# Patient Record
Sex: FEMALE | Race: WHITE | NOT HISPANIC OR LATINO | URBAN - METROPOLITAN AREA
[De-identification: names, ages, dates, MRNs, and addresses within clinical notes are randomized per-mention and may not be internally consistent; named-entity substitution may affect disease eponyms.]

---

## 2017-01-12 ENCOUNTER — EMERGENCY (EMERGENCY)
Facility: HOSPITAL | Age: 47
LOS: 1 days | Discharge: HOME | End: 2017-01-12
Admitting: EMERGENCY MEDICINE
Payer: COMMERCIAL

## 2017-01-12 ENCOUNTER — TRANSCRIPTION ENCOUNTER (OUTPATIENT)
Age: 47
End: 2017-01-12

## 2017-01-12 LAB
ALBUMIN SERPL ELPH-MCNC: 3.4 G/DL — SIGNIFICANT CHANGE UP (ref 3.3–5)
ALP SERPL-CCNC: 129 U/L — HIGH (ref 30–120)
ALT FLD-CCNC: 21 U/L DA — SIGNIFICANT CHANGE UP (ref 10–60)
AMYLASE P1 CFR SERPL: 31 U/L — SIGNIFICANT CHANGE UP (ref 25–125)
ANION GAP SERPL CALC-SCNC: 10 MMOL/L — SIGNIFICANT CHANGE UP (ref 5–17)
ANISOCYTOSIS BLD QL: SLIGHT — SIGNIFICANT CHANGE UP
APPEARANCE UR: CLEAR — SIGNIFICANT CHANGE UP
APTT BLD: 33.5 SEC — SIGNIFICANT CHANGE UP (ref 27.5–37.4)
AST SERPL-CCNC: 10 U/L — SIGNIFICANT CHANGE UP (ref 10–40)
BASOPHILS # BLD AUTO: 0.1 K/UL — SIGNIFICANT CHANGE UP (ref 0–0.2)
BASOPHILS NFR BLD AUTO: 0.6 % — SIGNIFICANT CHANGE UP (ref 0–2)
BILIRUB SERPL-MCNC: 0.4 MG/DL — SIGNIFICANT CHANGE UP (ref 0.2–1.2)
BILIRUB UR-MCNC: NEGATIVE — SIGNIFICANT CHANGE UP
BUN SERPL-MCNC: 8 MG/DL — SIGNIFICANT CHANGE UP (ref 7–23)
CALCIUM SERPL-MCNC: 9 MG/DL — SIGNIFICANT CHANGE UP (ref 8.4–10.5)
CHLORIDE SERPL-SCNC: 98 MMOL/L — SIGNIFICANT CHANGE UP (ref 96–108)
CO2 SERPL-SCNC: 28 MMOL/L — SIGNIFICANT CHANGE UP (ref 22–31)
COLOR SPEC: YELLOW — SIGNIFICANT CHANGE UP
CREAT SERPL-MCNC: 0.85 MG/DL — SIGNIFICANT CHANGE UP (ref 0.5–1.3)
DIFF PNL FLD: ABNORMAL
EOSINOPHIL # BLD AUTO: 0 K/UL — SIGNIFICANT CHANGE UP (ref 0–0.5)
EOSINOPHIL NFR BLD AUTO: 0.3 % — SIGNIFICANT CHANGE UP (ref 0–6)
EPI CELLS # UR: SIGNIFICANT CHANGE UP
GLUCOSE SERPL-MCNC: 231 MG/DL — HIGH (ref 70–99)
GLUCOSE UR QL: 50 MG/DL
HCT VFR BLD CALC: 38.6 % — SIGNIFICANT CHANGE UP (ref 34.5–45)
HGB BLD-MCNC: 12 G/DL — SIGNIFICANT CHANGE UP (ref 11.5–15.5)
HYPOCHROMIA BLD QL: SLIGHT — SIGNIFICANT CHANGE UP
INR BLD: 1.18 RATIO — HIGH (ref 0.88–1.16)
KETONES UR-MCNC: NEGATIVE — SIGNIFICANT CHANGE UP
LEUKOCYTE ESTERASE UR-ACNC: NEGATIVE — SIGNIFICANT CHANGE UP
LIDOCAIN IGE QN: 108 U/L — SIGNIFICANT CHANGE UP (ref 73–393)
LYMPHOCYTES # BLD AUTO: 18.2 % — SIGNIFICANT CHANGE UP (ref 13–44)
LYMPHOCYTES # BLD AUTO: 2.9 K/UL — SIGNIFICANT CHANGE UP (ref 1–3.3)
MACROCYTES BLD QL: SLIGHT — SIGNIFICANT CHANGE UP
MCHC RBC-ENTMCNC: 23.6 PG — LOW (ref 27–34)
MCHC RBC-ENTMCNC: 31.1 GM/DL — LOW (ref 32–36)
MCV RBC AUTO: 76.1 FL — LOW (ref 80–100)
MICROCYTES BLD QL: SLIGHT — SIGNIFICANT CHANGE UP
MONOCYTES # BLD AUTO: 0.7 K/UL — SIGNIFICANT CHANGE UP (ref 0–0.9)
MONOCYTES NFR BLD AUTO: 4.5 % — SIGNIFICANT CHANGE UP (ref 2–14)
NEUTROPHILS # BLD AUTO: 12.2 K/UL — HIGH (ref 1.8–7.4)
NEUTROPHILS NFR BLD AUTO: 76.4 % — SIGNIFICANT CHANGE UP (ref 43–77)
NITRITE UR-MCNC: NEGATIVE — SIGNIFICANT CHANGE UP
OVALOCYTES BLD QL SMEAR: SLIGHT — SIGNIFICANT CHANGE UP
PH UR: 5 — SIGNIFICANT CHANGE UP (ref 4.8–8)
PLAT MORPH BLD: NORMAL — SIGNIFICANT CHANGE UP
PLATELET # BLD AUTO: 491 K/UL — HIGH (ref 150–400)
POIKILOCYTOSIS BLD QL AUTO: SLIGHT — SIGNIFICANT CHANGE UP
POLYCHROMASIA BLD QL SMEAR: SLIGHT — SIGNIFICANT CHANGE UP
POTASSIUM SERPL-MCNC: 3.6 MMOL/L — SIGNIFICANT CHANGE UP (ref 3.5–5.3)
POTASSIUM SERPL-SCNC: 3.6 MMOL/L — SIGNIFICANT CHANGE UP (ref 3.5–5.3)
PROT SERPL-MCNC: 8 G/DL — SIGNIFICANT CHANGE UP (ref 6–8.3)
PROT UR-MCNC: 30 MG/DL
PROTHROM AB SERPL-ACNC: 13.2 SEC — HIGH (ref 10–13.1)
RBC # BLD: 5.07 M/UL — SIGNIFICANT CHANGE UP (ref 3.8–5.2)
RBC # FLD: 15.6 % — HIGH (ref 10.3–14.5)
RBC BLD AUTO: ABNORMAL
RBC CASTS # UR COMP ASSIST: ABNORMAL /HPF (ref 0–4)
SODIUM SERPL-SCNC: 136 MMOL/L — SIGNIFICANT CHANGE UP (ref 135–145)
SP GR SPEC: 1.02 — SIGNIFICANT CHANGE UP (ref 1.01–1.02)
UROBILINOGEN FLD QL: NEGATIVE MG/DL — SIGNIFICANT CHANGE UP
WBC # BLD: 16 K/UL — HIGH (ref 3.8–10.5)
WBC # FLD AUTO: 16 K/UL — HIGH (ref 3.8–10.5)

## 2017-01-12 PROCEDURE — 85610 PROTHROMBIN TIME: CPT

## 2017-01-12 PROCEDURE — 76830 TRANSVAGINAL US NON-OB: CPT | Mod: 26

## 2017-01-12 PROCEDURE — 74177 CT ABD & PELVIS W/CONTRAST: CPT | Mod: 26

## 2017-01-12 PROCEDURE — 99284 EMERGENCY DEPT VISIT MOD MDM: CPT | Mod: 25

## 2017-01-12 PROCEDURE — 96375 TX/PRO/DX INJ NEW DRUG ADDON: CPT

## 2017-01-12 PROCEDURE — 83690 ASSAY OF LIPASE: CPT

## 2017-01-12 PROCEDURE — 81001 URINALYSIS AUTO W/SCOPE: CPT

## 2017-01-12 PROCEDURE — 74177 CT ABD & PELVIS W/CONTRAST: CPT

## 2017-01-12 PROCEDURE — 85027 COMPLETE CBC AUTOMATED: CPT

## 2017-01-12 PROCEDURE — 80053 COMPREHEN METABOLIC PANEL: CPT

## 2017-01-12 PROCEDURE — 96374 THER/PROPH/DIAG INJ IV PUSH: CPT

## 2017-01-12 PROCEDURE — 86901 BLOOD TYPING SEROLOGIC RH(D): CPT

## 2017-01-12 PROCEDURE — 85730 THROMBOPLASTIN TIME PARTIAL: CPT

## 2017-01-12 PROCEDURE — 76830 TRANSVAGINAL US NON-OB: CPT

## 2017-01-12 PROCEDURE — 99284 EMERGENCY DEPT VISIT MOD MDM: CPT

## 2017-01-12 PROCEDURE — 82150 ASSAY OF AMYLASE: CPT

## 2017-01-19 ENCOUNTER — RESULT REVIEW (OUTPATIENT)
Age: 47
End: 2017-01-19

## 2017-01-20 ENCOUNTER — APPOINTMENT (OUTPATIENT)
Dept: OBGYN | Facility: CLINIC | Age: 47
End: 2017-01-20

## 2017-02-27 ENCOUNTER — APPOINTMENT (OUTPATIENT)
Dept: OBGYN | Facility: CLINIC | Age: 47
End: 2017-02-27

## 2017-06-02 ENCOUNTER — APPOINTMENT (OUTPATIENT)
Dept: OBGYN | Facility: CLINIC | Age: 47
End: 2017-06-02

## 2017-09-19 ENCOUNTER — TRANSCRIPTION ENCOUNTER (OUTPATIENT)
Age: 47
End: 2017-09-19

## 2017-09-25 ENCOUNTER — TRANSCRIPTION ENCOUNTER (OUTPATIENT)
Age: 47
End: 2017-09-25

## 2018-11-12 ENCOUNTER — TRANSCRIPTION ENCOUNTER (OUTPATIENT)
Age: 48
End: 2018-11-12

## 2019-01-12 ENCOUNTER — TRANSCRIPTION ENCOUNTER (OUTPATIENT)
Age: 49
End: 2019-01-12

## 2019-02-01 ENCOUNTER — RESULT REVIEW (OUTPATIENT)
Age: 49
End: 2019-02-01

## 2019-02-01 ENCOUNTER — APPOINTMENT (OUTPATIENT)
Dept: OBGYN | Facility: CLINIC | Age: 49
End: 2019-02-01
Payer: COMMERCIAL

## 2019-02-01 PROCEDURE — 99396 PREV VISIT EST AGE 40-64: CPT

## 2019-02-19 ENCOUNTER — RESULT REVIEW (OUTPATIENT)
Age: 49
End: 2019-02-19

## 2019-02-20 ENCOUNTER — APPOINTMENT (OUTPATIENT)
Dept: OBGYN | Facility: CLINIC | Age: 49
End: 2019-02-20
Payer: COMMERCIAL

## 2019-02-20 PROCEDURE — 58100 BIOPSY OF UTERUS LINING: CPT

## 2019-03-19 ENCOUNTER — RESULT CHARGE (OUTPATIENT)
Age: 49
End: 2019-03-19

## 2019-03-20 ENCOUNTER — APPOINTMENT (OUTPATIENT)
Dept: INTERNAL MEDICINE | Facility: CLINIC | Age: 49
End: 2019-03-20
Payer: COMMERCIAL

## 2019-03-20 ENCOUNTER — NON-APPOINTMENT (OUTPATIENT)
Age: 49
End: 2019-03-20

## 2019-03-20 VITALS
HEART RATE: 97 BPM | SYSTOLIC BLOOD PRESSURE: 126 MMHG | BODY MASS INDEX: 38.89 KG/M2 | WEIGHT: 242 LBS | RESPIRATION RATE: 16 BRPM | OXYGEN SATURATION: 98 % | TEMPERATURE: 98.5 F | DIASTOLIC BLOOD PRESSURE: 88 MMHG | HEIGHT: 66 IN

## 2019-03-20 DIAGNOSIS — Z82.49 FAMILY HISTORY OF ISCHEMIC HEART DISEASE AND OTHER DISEASES OF THE CIRCULATORY SYSTEM: ICD-10-CM

## 2019-03-20 DIAGNOSIS — Z01.810 ENCOUNTER FOR PREPROCEDURAL CARDIOVASCULAR EXAMINATION: ICD-10-CM

## 2019-03-20 DIAGNOSIS — K60.3 ANAL FISTULA: ICD-10-CM

## 2019-03-20 DIAGNOSIS — F41.9 ANXIETY DISORDER, UNSPECIFIED: ICD-10-CM

## 2019-03-20 DIAGNOSIS — Z87.19 PERSONAL HISTORY OF OTHER DISEASES OF THE DIGESTIVE SYSTEM: ICD-10-CM

## 2019-03-20 DIAGNOSIS — R00.2 PALPITATIONS: ICD-10-CM

## 2019-03-20 PROCEDURE — 93000 ELECTROCARDIOGRAM COMPLETE: CPT

## 2019-03-20 PROCEDURE — 99214 OFFICE O/P EST MOD 30 MIN: CPT | Mod: 25

## 2019-03-20 NOTE — HISTORY OF PRESENT ILLNESS
[FreeTextEntry8] : A 48- year -old female who presents to the office today for evaluation of heart palpitations and chest tightness.  Patient states that she's been under tremendous amount of stress at home secondary to her kids.  Patient states her kids learning disability and ADD and been having a lot of difficulty in school this year.  Patient is unaware of what makes the tightness better. Does feel that the chest tightness and heart palpitations or increased when she is stressed

## 2019-03-20 NOTE — HEALTH RISK ASSESSMENT
[21-25] : 21-25 [No falls in past year] : Patient reported no falls in the past year [1] : 1) Little interest or pleasure doing things for several days (1) [0] : 2) Feeling down, depressed, or hopeless: Not at all (0) [] : No [YearQuit] : 2003 [de-identified] : weekend  [de-identified] : walk [de-identified] : regular, weight watches  [[id number]] : 1

## 2019-03-20 NOTE — REVIEW OF SYSTEMS
[Negative] : Heme/Lymph [Chest Pain] : chest pain [Palpitations] : palpitations [de-identified] : stressed

## 2019-03-20 NOTE — PHYSICAL EXAM
[No Acute Distress] : no acute distress [Well Developed] : well developed [Well Nourished] : well nourished [Normal Sclera/Conjunctiva] : normal sclera/conjunctiva [Well-Appearing] : well-appearing [PERRL] : pupils equal round and reactive to light [EOMI] : extraocular movements intact [Normal Outer Ear/Nose] : the outer ears and nose were normal in appearance [No JVD] : no jugular venous distention [Normal Oropharynx] : the oropharynx was normal [No Lymphadenopathy] : no lymphadenopathy [Supple] : supple [Thyroid Normal, No Nodules] : the thyroid was normal and there were no nodules present [No Respiratory Distress] : no respiratory distress  [Clear to Auscultation] : lungs were clear to auscultation bilaterally [No Accessory Muscle Use] : no accessory muscle use [Normal Rate] : normal rate  [Regular Rhythm] : with a regular rhythm [Normal S1, S2] : normal S1 and S2 [No Carotid Bruits] : no carotid bruits [No Abdominal Bruit] : a ~M bruit was not heard ~T in the abdomen [No Varicosities] : no varicosities [Pedal Pulses Present] : the pedal pulses are present [No Edema] : there was no peripheral edema [No Extremity Clubbing/Cyanosis] : no extremity clubbing/cyanosis [No Palpable Aorta] : no palpable aorta [Soft] : abdomen soft [Non-distended] : non-distended [Non Tender] : non-tender [No Masses] : no abdominal mass palpated [No HSM] : no HSM [Normal Bowel Sounds] : normal bowel sounds [Normal Posterior Cervical Nodes] : no posterior cervical lymphadenopathy [No CVA Tenderness] : no CVA  tenderness [Normal Anterior Cervical Nodes] : no anterior cervical lymphadenopathy [No Spinal Tenderness] : no spinal tenderness [No Joint Swelling] : no joint swelling [Grossly Normal Strength/Tone] : grossly normal strength/tone [No Rash] : no rash [Normal Gait] : normal gait [Coordination Grossly Intact] : coordination grossly intact [No Focal Deficits] : no focal deficits [Deep Tendon Reflexes (DTR)] : deep tendon reflexes were 2+ and symmetric [Normal Affect] : the affect was normal [Normal Insight/Judgement] : insight and judgment were intact [de-identified] : with murmur

## 2019-03-20 NOTE — PAST MEDICAL HISTORY
[Perimenopausal] : hx of being perimenopausal [Menarche Age ____] : age at menarche was [unfilled] [Definite ___ (Date)] : the last menstrual period was [unfilled] [Irregular Cycle Intervals] : are  irregular [Total Preg ___] : G: [unfilled] [Live Births___] : P: [unfilled] [Full Term ___] : Full Term: [unfilled]  [Abortions ___] : Abortions: [unfilled] [AB Spont ___] : miscarriage(s): [unfilled]

## 2019-03-20 NOTE — ASSESSMENT
[FreeTextEntry1] : A 48-year-old female who presents to the office for evaluation of chest pressure and heart palpitation.

## 2019-03-20 NOTE — COUNSELING
[Weight management counseling provided] : Weight management [Behavioral health counseling provided] : behavioral health  [Healthy eating counseling provided] : healthy eating [Good understanding] : Patient has a good understanding of disease, goals and obesity follow-up plan [Low Fat Diet] : Low fat diet [Decrease Portions] : Decrease food portions [Low Salt Diet] : Low salt diet [Walking] : Walking [None] : None

## 2019-04-17 ENCOUNTER — APPOINTMENT (OUTPATIENT)
Dept: INTERNAL MEDICINE | Facility: CLINIC | Age: 49
End: 2019-04-17

## 2019-05-07 DIAGNOSIS — I37.1 NONRHEUMATIC PULMONARY VALVE INSUFFICIENCY: ICD-10-CM

## 2019-05-07 DIAGNOSIS — I07.1 RHEUMATIC TRICUSPID INSUFFICIENCY: ICD-10-CM

## 2019-07-25 ENCOUNTER — INPATIENT (INPATIENT)
Facility: HOSPITAL | Age: 49
LOS: 4 days | Discharge: HOME | DRG: 872 | End: 2019-07-30
Attending: INTERNAL MEDICINE | Admitting: INTERNAL MEDICINE
Payer: COMMERCIAL

## 2019-07-25 VITALS
WEIGHT: 240.08 LBS | RESPIRATION RATE: 18 BRPM | TEMPERATURE: 103 F | OXYGEN SATURATION: 98 % | SYSTOLIC BLOOD PRESSURE: 164 MMHG | HEART RATE: 138 BPM | DIASTOLIC BLOOD PRESSURE: 89 MMHG | HEIGHT: 66 IN

## 2019-07-25 LAB
ALBUMIN SERPL ELPH-MCNC: 3.2 G/DL — LOW (ref 3.3–5)
ALP SERPL-CCNC: 122 U/L — HIGH (ref 30–120)
ALT FLD-CCNC: 24 U/L DA — SIGNIFICANT CHANGE UP (ref 10–60)
ANION GAP SERPL CALC-SCNC: 10 MMOL/L — SIGNIFICANT CHANGE UP (ref 5–17)
APPEARANCE UR: ABNORMAL
APTT BLD: 33.7 SEC — SIGNIFICANT CHANGE UP (ref 28.5–37)
AST SERPL-CCNC: 18 U/L — SIGNIFICANT CHANGE UP (ref 10–40)
BACTERIA # UR AUTO: ABNORMAL
BASOPHILS # BLD AUTO: 0.03 K/UL — SIGNIFICANT CHANGE UP (ref 0–0.2)
BASOPHILS NFR BLD AUTO: 0.1 % — SIGNIFICANT CHANGE UP (ref 0–2)
BILIRUB SERPL-MCNC: 0.5 MG/DL — SIGNIFICANT CHANGE UP (ref 0.2–1.2)
BILIRUB UR-MCNC: NEGATIVE — SIGNIFICANT CHANGE UP
BUN SERPL-MCNC: 10 MG/DL — SIGNIFICANT CHANGE UP (ref 7–23)
CALCIUM SERPL-MCNC: 9.2 MG/DL — SIGNIFICANT CHANGE UP (ref 8.4–10.5)
CHLORIDE SERPL-SCNC: 96 MMOL/L — SIGNIFICANT CHANGE UP (ref 96–108)
CO2 SERPL-SCNC: 25 MMOL/L — SIGNIFICANT CHANGE UP (ref 22–31)
COLOR SPEC: YELLOW — SIGNIFICANT CHANGE UP
CREAT SERPL-MCNC: 0.73 MG/DL — SIGNIFICANT CHANGE UP (ref 0.5–1.3)
DIFF PNL FLD: ABNORMAL
EOSINOPHIL # BLD AUTO: 0.01 K/UL — SIGNIFICANT CHANGE UP (ref 0–0.5)
EOSINOPHIL NFR BLD AUTO: 0 % — SIGNIFICANT CHANGE UP (ref 0–6)
EPI CELLS # UR: SIGNIFICANT CHANGE UP
GLUCOSE SERPL-MCNC: 168 MG/DL — HIGH (ref 70–99)
GLUCOSE UR QL: 1000 MG/DL
HCT VFR BLD CALC: 40 % — SIGNIFICANT CHANGE UP (ref 34.5–45)
HGB BLD-MCNC: 12.6 G/DL — SIGNIFICANT CHANGE UP (ref 11.5–15.5)
IMM GRANULOCYTES NFR BLD AUTO: 0.6 % — SIGNIFICANT CHANGE UP (ref 0–1.5)
INR BLD: 1.2 RATIO — HIGH (ref 0.88–1.16)
KETONES UR-MCNC: ABNORMAL
LACTATE SERPL-SCNC: 1.6 MMOL/L — SIGNIFICANT CHANGE UP (ref 0.7–2)
LACTATE SERPL-SCNC: 2.2 MMOL/L — HIGH (ref 0.7–2)
LEUKOCYTE ESTERASE UR-ACNC: ABNORMAL
LYMPHOCYTES # BLD AUTO: 1.12 K/UL — SIGNIFICANT CHANGE UP (ref 1–3.3)
LYMPHOCYTES # BLD AUTO: 5.1 % — LOW (ref 13–44)
MCHC RBC-ENTMCNC: 23.8 PG — LOW (ref 27–34)
MCHC RBC-ENTMCNC: 31.5 GM/DL — LOW (ref 32–36)
MCV RBC AUTO: 75.5 FL — LOW (ref 80–100)
MONOCYTES # BLD AUTO: 1.25 K/UL — HIGH (ref 0–0.9)
MONOCYTES NFR BLD AUTO: 5.7 % — SIGNIFICANT CHANGE UP (ref 2–14)
NEUTROPHILS # BLD AUTO: 19.26 K/UL — HIGH (ref 1.8–7.4)
NEUTROPHILS NFR BLD AUTO: 88.5 % — HIGH (ref 43–77)
NITRITE UR-MCNC: NEGATIVE — SIGNIFICANT CHANGE UP
NRBC # BLD: 0 /100 WBCS — SIGNIFICANT CHANGE UP (ref 0–0)
PH UR: 5 — SIGNIFICANT CHANGE UP (ref 5–8)
PLATELET # BLD AUTO: 404 K/UL — HIGH (ref 150–400)
POTASSIUM SERPL-MCNC: 3.8 MMOL/L — SIGNIFICANT CHANGE UP (ref 3.5–5.3)
POTASSIUM SERPL-SCNC: 3.8 MMOL/L — SIGNIFICANT CHANGE UP (ref 3.5–5.3)
PROT SERPL-MCNC: 7.9 G/DL — SIGNIFICANT CHANGE UP (ref 6–8.3)
PROT UR-MCNC: 30 MG/DL
PROTHROM AB SERPL-ACNC: 13.1 SEC — HIGH (ref 10–12.9)
RBC # BLD: 5.3 M/UL — HIGH (ref 3.8–5.2)
RBC # FLD: 18.5 % — HIGH (ref 10.3–14.5)
RBC CASTS # UR COMP ASSIST: >50 /HPF (ref 0–4)
SODIUM SERPL-SCNC: 131 MMOL/L — LOW (ref 135–145)
SP GR SPEC: 1.01 — SIGNIFICANT CHANGE UP (ref 1.01–1.02)
UROBILINOGEN FLD QL: NEGATIVE MG/DL — SIGNIFICANT CHANGE UP
WBC # BLD: 21.81 K/UL — HIGH (ref 3.8–10.5)
WBC # FLD AUTO: 21.81 K/UL — HIGH (ref 3.8–10.5)
WBC UR QL: SIGNIFICANT CHANGE UP

## 2019-07-25 PROCEDURE — 74176 CT ABD & PELVIS W/O CONTRAST: CPT | Mod: 26

## 2019-07-25 PROCEDURE — 71045 X-RAY EXAM CHEST 1 VIEW: CPT | Mod: 26

## 2019-07-25 PROCEDURE — 93010 ELECTROCARDIOGRAM REPORT: CPT

## 2019-07-25 RX ORDER — IOHEXOL 300 MG/ML
30 INJECTION, SOLUTION INTRAVENOUS ONCE
Refills: 0 | Status: COMPLETED | OUTPATIENT
Start: 2019-07-25 | End: 2019-07-25

## 2019-07-25 RX ORDER — SODIUM CHLORIDE 9 MG/ML
3400 INJECTION INTRAMUSCULAR; INTRAVENOUS; SUBCUTANEOUS ONCE
Refills: 0 | Status: COMPLETED | OUTPATIENT
Start: 2019-07-25 | End: 2019-07-25

## 2019-07-25 RX ORDER — KETOROLAC TROMETHAMINE 30 MG/ML
30 SYRINGE (ML) INJECTION ONCE
Refills: 0 | Status: DISCONTINUED | OUTPATIENT
Start: 2019-07-25 | End: 2019-07-25

## 2019-07-25 RX ADMIN — IOHEXOL 30 MILLILITER(S): 300 INJECTION, SOLUTION INTRAVENOUS at 20:21

## 2019-07-25 RX ADMIN — SODIUM CHLORIDE 3400 MILLILITER(S): 9 INJECTION INTRAMUSCULAR; INTRAVENOUS; SUBCUTANEOUS at 19:53

## 2019-07-25 RX ADMIN — Medication 30 MILLIGRAM(S): at 19:58

## 2019-07-25 RX ADMIN — Medication 30 MILLIGRAM(S): at 21:00

## 2019-07-25 RX ADMIN — SODIUM CHLORIDE 3400 MILLILITER(S): 9 INJECTION INTRAMUSCULAR; INTRAVENOUS; SUBCUTANEOUS at 21:40

## 2019-07-25 NOTE — ED PROVIDER NOTE - NS_ ATTENDINGSCRIBEDETAILS _ED_A_ED_FT
Wade Palacios MD - The scribe's documentation has been prepared under my direction and personally reviewed by me in its entirety. I confirm that the note above accurately reflects all work, treatment, procedures, and medical decision making performed by me.

## 2019-07-25 NOTE — ED PROVIDER NOTE - CONSTITUTIONAL, MLM
normal... Well appearing, well nourished, awake, alert, oriented to person, place, time/situation and in no apparent distress. Febrile

## 2019-07-25 NOTE — ED ADULT NURSE NOTE - CHPI ED NUR SYMPTOMS NEG
no blood in stool/no diarrhea/no hematuria/no nausea/no dysuria/no abdominal distension/no burning urination/no vomiting

## 2019-07-25 NOTE — ED ADULT NURSE NOTE - OBJECTIVE STATEMENT
49 yr old female walked in with  c/o fever, pain in RLQ today; chills and pain resolved; RLQ slightly tender on exam; denies n/v/d; last tylenol at 1200; hx right side ovarian cyst

## 2019-07-25 NOTE — ED ADULT NURSE NOTE - NSIMPLEMENTINTERV_GEN_ALL_ED
Implemented All Universal Safety Interventions:  [place] to call system. Call bell, personal items and telephone within reach. Instruct patient to call for assistance. Room bathroom lighting operational. Non-slip footwear when patient is off stretcher. Physically safe environment: no spills, clutter or unnecessary equipment. Stretcher in lowest position, wheels locked, appropriate side rails in place.

## 2019-07-25 NOTE — ED PROVIDER NOTE - OBJECTIVE STATEMENT
48 y/o F pt w/ PMHx DM, ovarian cysts and PSHx umbilical herniorrhaphy and  x 2 presents to the ED c/o R lower abd pain x 11:00 today with associated fever and chills. Pt reports pain began while she was at work today and then developed a fever and chills, took anti-pyretic around 12:00 today. Describes pain as cramping. Endorses she had a similar pain in the past and was diagnosed with ovarian cysts on L side that have been followed by GYN, denies PCOS diagnosis. Endorses she is pre-menopausal, started menstruation 3 days ago and last menstruation before now was in January. Pt denies n/v/d, constipation, urinary complaints, cough, rhinorrhea, sore throat or any other complaints at this time. Pt denying pain medication at this time. Allergy: IV contrast    PMD Dr Venancio Rowe

## 2019-07-25 NOTE — ED PROVIDER NOTE - CHPI ED SYMPTOMS NEG
no cough, no rhinorrhea, no sore throat, no constipation/no diarrhea/no hematuria/no nausea/no burning urination/no dysuria/no vomiting

## 2019-07-26 DIAGNOSIS — R50.9 FEVER, UNSPECIFIED: ICD-10-CM

## 2019-07-26 DIAGNOSIS — R10.9 UNSPECIFIED ABDOMINAL PAIN: ICD-10-CM

## 2019-07-26 DIAGNOSIS — A41.9 SEPSIS, UNSPECIFIED ORGANISM: ICD-10-CM

## 2019-07-26 DIAGNOSIS — Z29.9 ENCOUNTER FOR PROPHYLACTIC MEASURES, UNSPECIFIED: ICD-10-CM

## 2019-07-26 DIAGNOSIS — R31.29 OTHER MICROSCOPIC HEMATURIA: ICD-10-CM

## 2019-07-26 DIAGNOSIS — R10.31 RIGHT LOWER QUADRANT PAIN: ICD-10-CM

## 2019-07-26 DIAGNOSIS — F32.9 MAJOR DEPRESSIVE DISORDER, SINGLE EPISODE, UNSPECIFIED: ICD-10-CM

## 2019-07-26 DIAGNOSIS — E78.5 HYPERLIPIDEMIA, UNSPECIFIED: ICD-10-CM

## 2019-07-26 DIAGNOSIS — E11.9 TYPE 2 DIABETES MELLITUS WITHOUT COMPLICATIONS: ICD-10-CM

## 2019-07-26 LAB
ANION GAP SERPL CALC-SCNC: 10 MMOL/L — SIGNIFICANT CHANGE UP (ref 5–17)
BASOPHILS # BLD AUTO: 0.03 K/UL — SIGNIFICANT CHANGE UP (ref 0–0.2)
BASOPHILS NFR BLD AUTO: 0.2 % — SIGNIFICANT CHANGE UP (ref 0–2)
BUN SERPL-MCNC: 6 MG/DL — LOW (ref 7–23)
CALCIUM SERPL-MCNC: 8.4 MG/DL — SIGNIFICANT CHANGE UP (ref 8.4–10.5)
CHLORIDE SERPL-SCNC: 102 MMOL/L — SIGNIFICANT CHANGE UP (ref 96–108)
CO2 SERPL-SCNC: 23 MMOL/L — SIGNIFICANT CHANGE UP (ref 22–31)
CREAT SERPL-MCNC: 0.66 MG/DL — SIGNIFICANT CHANGE UP (ref 0.5–1.3)
EOSINOPHIL # BLD AUTO: 0.01 K/UL — SIGNIFICANT CHANGE UP (ref 0–0.5)
EOSINOPHIL NFR BLD AUTO: 0.1 % — SIGNIFICANT CHANGE UP (ref 0–6)
GLUCOSE SERPL-MCNC: 147 MG/DL — HIGH (ref 70–99)
HCT VFR BLD CALC: 34.7 % — SIGNIFICANT CHANGE UP (ref 34.5–45)
HGB BLD-MCNC: 11 G/DL — LOW (ref 11.5–15.5)
IMM GRANULOCYTES NFR BLD AUTO: 0.6 % — SIGNIFICANT CHANGE UP (ref 0–1.5)
LYMPHOCYTES # BLD AUTO: 1.2 K/UL — SIGNIFICANT CHANGE UP (ref 1–3.3)
LYMPHOCYTES # BLD AUTO: 6.5 % — LOW (ref 13–44)
MCHC RBC-ENTMCNC: 24.1 PG — LOW (ref 27–34)
MCHC RBC-ENTMCNC: 31.7 GM/DL — LOW (ref 32–36)
MCV RBC AUTO: 75.9 FL — LOW (ref 80–100)
MONOCYTES # BLD AUTO: 1.14 K/UL — HIGH (ref 0–0.9)
MONOCYTES NFR BLD AUTO: 6.2 % — SIGNIFICANT CHANGE UP (ref 2–14)
NEUTROPHILS # BLD AUTO: 16.04 K/UL — HIGH (ref 1.8–7.4)
NEUTROPHILS NFR BLD AUTO: 86.4 % — HIGH (ref 43–77)
NRBC # BLD: 0 /100 WBCS — SIGNIFICANT CHANGE UP (ref 0–0)
PLATELET # BLD AUTO: 323 K/UL — SIGNIFICANT CHANGE UP (ref 150–400)
POTASSIUM SERPL-MCNC: 3.3 MMOL/L — LOW (ref 3.5–5.3)
POTASSIUM SERPL-SCNC: 3.3 MMOL/L — LOW (ref 3.5–5.3)
RBC # BLD: 4.57 M/UL — SIGNIFICANT CHANGE UP (ref 3.8–5.2)
RBC # FLD: 18.3 % — HIGH (ref 10.3–14.5)
SODIUM SERPL-SCNC: 135 MMOL/L — SIGNIFICANT CHANGE UP (ref 135–145)
WBC # BLD: 18.53 K/UL — HIGH (ref 3.8–10.5)
WBC # FLD AUTO: 18.53 K/UL — HIGH (ref 3.8–10.5)

## 2019-07-26 PROCEDURE — 99223 1ST HOSP IP/OBS HIGH 75: CPT

## 2019-07-26 PROCEDURE — 76856 US EXAM PELVIC COMPLETE: CPT | Mod: 26

## 2019-07-26 PROCEDURE — 12345: CPT | Mod: NC

## 2019-07-26 PROCEDURE — 99285 EMERGENCY DEPT VISIT HI MDM: CPT

## 2019-07-26 RX ORDER — ACETAMINOPHEN 500 MG
650 TABLET ORAL EVERY 6 HOURS
Refills: 0 | Status: DISCONTINUED | OUTPATIENT
Start: 2019-07-26 | End: 2019-07-30

## 2019-07-26 RX ORDER — DEXTROSE 50 % IN WATER 50 %
15 SYRINGE (ML) INTRAVENOUS ONCE
Refills: 0 | Status: DISCONTINUED | OUTPATIENT
Start: 2019-07-26 | End: 2019-07-30

## 2019-07-26 RX ORDER — MORPHINE SULFATE 50 MG/1
4 CAPSULE, EXTENDED RELEASE ORAL EVERY 4 HOURS
Refills: 0 | Status: DISCONTINUED | OUTPATIENT
Start: 2019-07-26 | End: 2019-07-26

## 2019-07-26 RX ORDER — METRONIDAZOLE 500 MG
500 TABLET ORAL EVERY 8 HOURS
Refills: 0 | Status: DISCONTINUED | OUTPATIENT
Start: 2019-07-26 | End: 2019-07-26

## 2019-07-26 RX ORDER — GLUCAGON INJECTION, SOLUTION 0.5 MG/.1ML
1 INJECTION, SOLUTION SUBCUTANEOUS ONCE
Refills: 0 | Status: DISCONTINUED | OUTPATIENT
Start: 2019-07-26 | End: 2019-07-30

## 2019-07-26 RX ORDER — ENOXAPARIN SODIUM 100 MG/ML
40 INJECTION SUBCUTANEOUS DAILY
Refills: 0 | Status: DISCONTINUED | OUTPATIENT
Start: 2019-07-26 | End: 2019-07-30

## 2019-07-26 RX ORDER — SIMVASTATIN 20 MG/1
10 TABLET, FILM COATED ORAL AT BEDTIME
Refills: 0 | Status: DISCONTINUED | OUTPATIENT
Start: 2019-07-26 | End: 2019-07-30

## 2019-07-26 RX ORDER — CEFTRIAXONE 500 MG/1
1000 INJECTION, POWDER, FOR SOLUTION INTRAMUSCULAR; INTRAVENOUS EVERY 24 HOURS
Refills: 0 | Status: DISCONTINUED | OUTPATIENT
Start: 2019-07-26 | End: 2019-07-26

## 2019-07-26 RX ORDER — DEXTROSE 50 % IN WATER 50 %
25 SYRINGE (ML) INTRAVENOUS ONCE
Refills: 0 | Status: DISCONTINUED | OUTPATIENT
Start: 2019-07-26 | End: 2019-07-30

## 2019-07-26 RX ORDER — SODIUM CHLORIDE 9 MG/ML
1000 INJECTION, SOLUTION INTRAVENOUS
Refills: 0 | Status: DISCONTINUED | OUTPATIENT
Start: 2019-07-26 | End: 2019-07-30

## 2019-07-26 RX ORDER — POTASSIUM CHLORIDE 20 MEQ
40 PACKET (EA) ORAL EVERY 4 HOURS
Refills: 0 | Status: COMPLETED | OUTPATIENT
Start: 2019-07-26 | End: 2019-07-26

## 2019-07-26 RX ORDER — HYDROMORPHONE HYDROCHLORIDE 2 MG/ML
0.5 INJECTION INTRAMUSCULAR; INTRAVENOUS; SUBCUTANEOUS
Refills: 0 | Status: DISCONTINUED | OUTPATIENT
Start: 2019-07-26 | End: 2019-07-30

## 2019-07-26 RX ORDER — METRONIDAZOLE 500 MG
TABLET ORAL
Refills: 0 | Status: DISCONTINUED | OUTPATIENT
Start: 2019-07-26 | End: 2019-07-26

## 2019-07-26 RX ORDER — MORPHINE SULFATE 50 MG/1
2 CAPSULE, EXTENDED RELEASE ORAL EVERY 4 HOURS
Refills: 0 | Status: DISCONTINUED | OUTPATIENT
Start: 2019-07-26 | End: 2019-07-26

## 2019-07-26 RX ORDER — PIPERACILLIN AND TAZOBACTAM 4; .5 G/20ML; G/20ML
3.38 INJECTION, POWDER, LYOPHILIZED, FOR SOLUTION INTRAVENOUS ONCE
Refills: 0 | Status: DISCONTINUED | OUTPATIENT
Start: 2019-07-26 | End: 2019-07-26

## 2019-07-26 RX ORDER — CEFTRIAXONE 500 MG/1
1000 INJECTION, POWDER, FOR SOLUTION INTRAMUSCULAR; INTRAVENOUS EVERY 24 HOURS
Refills: 0 | Status: COMPLETED | OUTPATIENT
Start: 2019-07-26 | End: 2019-07-26

## 2019-07-26 RX ORDER — PIPERACILLIN AND TAZOBACTAM 4; .5 G/20ML; G/20ML
INJECTION, POWDER, LYOPHILIZED, FOR SOLUTION INTRAVENOUS
Refills: 0 | Status: DISCONTINUED | OUTPATIENT
Start: 2019-07-26 | End: 2019-07-30

## 2019-07-26 RX ORDER — OXYCODONE HYDROCHLORIDE 5 MG/1
5 TABLET ORAL EVERY 4 HOURS
Refills: 0 | Status: DISCONTINUED | OUTPATIENT
Start: 2019-07-26 | End: 2019-07-30

## 2019-07-26 RX ORDER — INSULIN LISPRO 100/ML
VIAL (ML) SUBCUTANEOUS
Refills: 0 | Status: DISCONTINUED | OUTPATIENT
Start: 2019-07-26 | End: 2019-07-30

## 2019-07-26 RX ORDER — PIPERACILLIN AND TAZOBACTAM 4; .5 G/20ML; G/20ML
3.38 INJECTION, POWDER, LYOPHILIZED, FOR SOLUTION INTRAVENOUS EVERY 8 HOURS
Refills: 0 | Status: DISCONTINUED | OUTPATIENT
Start: 2019-07-26 | End: 2019-07-30

## 2019-07-26 RX ORDER — SODIUM CHLORIDE 9 MG/ML
1000 INJECTION, SOLUTION INTRAVENOUS
Refills: 0 | Status: DISCONTINUED | OUTPATIENT
Start: 2019-07-26 | End: 2019-07-28

## 2019-07-26 RX ORDER — SODIUM CHLORIDE 9 MG/ML
1000 INJECTION, SOLUTION INTRAVENOUS ONCE
Refills: 0 | Status: COMPLETED | OUTPATIENT
Start: 2019-07-26 | End: 2019-07-26

## 2019-07-26 RX ORDER — INSULIN LISPRO 100/ML
VIAL (ML) SUBCUTANEOUS AT BEDTIME
Refills: 0 | Status: DISCONTINUED | OUTPATIENT
Start: 2019-07-26 | End: 2019-07-30

## 2019-07-26 RX ORDER — HEPARIN SODIUM 5000 [USP'U]/ML
5000 INJECTION INTRAVENOUS; SUBCUTANEOUS EVERY 8 HOURS
Refills: 0 | Status: DISCONTINUED | OUTPATIENT
Start: 2019-07-26 | End: 2019-07-26

## 2019-07-26 RX ORDER — PIPERACILLIN AND TAZOBACTAM 4; .5 G/20ML; G/20ML
3.38 INJECTION, POWDER, LYOPHILIZED, FOR SOLUTION INTRAVENOUS ONCE
Refills: 0 | Status: COMPLETED | OUTPATIENT
Start: 2019-07-26 | End: 2019-07-26

## 2019-07-26 RX ORDER — METRONIDAZOLE 500 MG
500 TABLET ORAL ONCE
Refills: 0 | Status: COMPLETED | OUTPATIENT
Start: 2019-07-26 | End: 2019-07-26

## 2019-07-26 RX ORDER — DEXTROSE 50 % IN WATER 50 %
12.5 SYRINGE (ML) INTRAVENOUS ONCE
Refills: 0 | Status: DISCONTINUED | OUTPATIENT
Start: 2019-07-26 | End: 2019-07-30

## 2019-07-26 RX ORDER — OXYCODONE HYDROCHLORIDE 5 MG/1
10 TABLET ORAL
Refills: 0 | Status: DISCONTINUED | OUTPATIENT
Start: 2019-07-26 | End: 2019-07-30

## 2019-07-26 RX ADMIN — PIPERACILLIN AND TAZOBACTAM 200 GRAM(S): 4; .5 INJECTION, POWDER, LYOPHILIZED, FOR SOLUTION INTRAVENOUS at 15:39

## 2019-07-26 RX ADMIN — OXYCODONE HYDROCHLORIDE 5 MILLIGRAM(S): 5 TABLET ORAL at 08:30

## 2019-07-26 RX ADMIN — SIMVASTATIN 10 MILLIGRAM(S): 20 TABLET, FILM COATED ORAL at 21:20

## 2019-07-26 RX ADMIN — OXYCODONE HYDROCHLORIDE 5 MILLIGRAM(S): 5 TABLET ORAL at 20:01

## 2019-07-26 RX ADMIN — OXYCODONE HYDROCHLORIDE 5 MILLIGRAM(S): 5 TABLET ORAL at 19:01

## 2019-07-26 RX ADMIN — Medication 650 MILLIGRAM(S): at 06:36

## 2019-07-26 RX ADMIN — SODIUM CHLORIDE 150 MILLILITER(S): 9 INJECTION, SOLUTION INTRAVENOUS at 03:34

## 2019-07-26 RX ADMIN — PIPERACILLIN AND TAZOBACTAM 200 GRAM(S): 4; .5 INJECTION, POWDER, LYOPHILIZED, FOR SOLUTION INTRAVENOUS at 21:20

## 2019-07-26 RX ADMIN — Medication 40 MILLIEQUIVALENT(S): at 10:16

## 2019-07-26 RX ADMIN — SODIUM CHLORIDE 150 MILLILITER(S): 9 INJECTION, SOLUTION INTRAVENOUS at 10:11

## 2019-07-26 RX ADMIN — Medication 650 MILLIGRAM(S): at 22:20

## 2019-07-26 RX ADMIN — CEFTRIAXONE 100 MILLIGRAM(S): 500 INJECTION, POWDER, FOR SOLUTION INTRAMUSCULAR; INTRAVENOUS at 01:37

## 2019-07-26 RX ADMIN — OXYCODONE HYDROCHLORIDE 5 MILLIGRAM(S): 5 TABLET ORAL at 07:25

## 2019-07-26 RX ADMIN — Medication 100 MILLIGRAM(S): at 10:10

## 2019-07-26 RX ADMIN — Medication 40 MILLIEQUIVALENT(S): at 13:06

## 2019-07-26 RX ADMIN — Medication 100 MILLIGRAM(S): at 02:35

## 2019-07-26 RX ADMIN — SODIUM CHLORIDE 1000 MILLILITER(S): 9 INJECTION, SOLUTION INTRAVENOUS at 02:30

## 2019-07-26 RX ADMIN — Medication 650 MILLIGRAM(S): at 21:20

## 2019-07-26 RX ADMIN — Medication 2: at 13:04

## 2019-07-26 RX ADMIN — HEPARIN SODIUM 5000 UNIT(S): 5000 INJECTION INTRAVENOUS; SUBCUTANEOUS at 05:40

## 2019-07-26 RX ADMIN — ENOXAPARIN SODIUM 40 MILLIGRAM(S): 100 INJECTION SUBCUTANEOUS at 13:05

## 2019-07-26 RX ADMIN — Medication 650 MILLIGRAM(S): at 05:30

## 2019-07-26 NOTE — CONSULT NOTE ADULT - SUBJECTIVE AND OBJECTIVE BOX
Full consult to follow    HPI:  This is a 50 y/o F with PMH of DM type 2, Dyslipidemia, Basal Cell CA, Obesity, Anxiety, and Depression who presented with RLQ non radiating crampy abdominal pain that worsens when she walking, started about 12 noon, then started to have fever with intermittent chills, no urinary symptoms, no nausea, vomiting or change in bowel habits. Patient stated that she is premenstrual, LMP was , and started again 3 days ago, denies any vaginal discharge or perineal pain. Of note that patient had similar pain before and was diagnosed with right adnexal complex cyst in 2017, was told that she has bleeding inside the cyst. She is following up with her GYN with sonograms on regular basis. (2019 01:12)        Past Medical & Surgical Hx:  PAST MEDICAL & SURGICAL HISTORY:  Diabetes  Gestational diabetes  Basal cell cancer: removed upper back  S/P anal fissurectomy: 10 yrs ago  S/P  section: twice, ,       Social History--  EtOH: denies ***  Tobacco: denies ***  Drug Use: denies ***    Travel/Environmental/Occupational History:  *** inserth T/E/O Hx ***    FAMILY HISTORY:  FH: HTN (hypertension): father  Family history of diabetes mellitus (DM): Mother      Allergies    IV Contrast (Hives)  latex (Rash)    Intolerances        Home/ Out patient  Medications :    Current Inpatient Medications :    ANTIBIOTICS:   cefTRIAXone   IVPB 1000 milliGRAM(s) IV Intermittent every 24 hours  metroNIDAZOLE  IVPB      metroNIDAZOLE  IVPB 500 milliGRAM(s) IV Intermittent every 8 hours      OTHER RELEVANT MEDICATIONS :  acetaminophen   Tablet .. 650 milliGRAM(s) Oral every 6 hours PRN  dextrose 40% Gel 15 Gram(s) Oral once PRN  dextrose 5%. 1000 milliLiter(s) IV Continuous <Continuous>  dextrose 50% Injectable 12.5 Gram(s) IV Push once  dextrose 50% Injectable 25 Gram(s) IV Push once  dextrose 50% Injectable 25 Gram(s) IV Push once  enoxaparin Injectable 40 milliGRAM(s) SubCutaneous daily  glucagon  Injectable 1 milliGRAM(s) IntraMuscular once PRN  HYDROmorphone  Injectable 0.5 milliGRAM(s) IV Push every 3 hours PRN  insulin lispro (HumaLOG) corrective regimen sliding scale   SubCutaneous three times a day before meals  insulin lispro (HumaLOG) corrective regimen sliding scale   SubCutaneous at bedtime  lactated ringers. 1000 milliLiter(s) IV Continuous <Continuous>  oxyCODONE    IR 5 milliGRAM(s) Oral every 4 hours PRN  oxyCODONE    IR 10 milliGRAM(s) Oral every 3 hours PRN  simvastatin 10 milliGRAM(s) Oral at bedtime      ROS:  Unable to obtain due to :     ROS:  CONSTITUTIONAL:  Negative fever or chills, feels well, good appetite  EYES:  Negative  blurry vision or double vision  CARDIOVASCULAR:  Negative for chest pain or palpitations  RESPIRATORY:  Negative for cough, wheezing, or SOB   GASTROINTESTINAL:  Negative for nausea, vomiting, diarrhea, constipation, or abdominal pain  GENITOURINARY:  Negative frequency, urgency , dysuria or hematuria   NEUROLOGIC:  No headache, confusion, dizziness, lightheadedness  All other systems were reviewed and are negative          I&O's Detail      Physical Exam:  Vital Signs Last 24 Hrs  T(C): 36.5 (2019 13:10), Max: 39.4 (2019 05:15)  T(F): 97.7 (2019 13:10), Max: 103 (2019 05:15)  HR: 105 (2019 13:10) (100 - 140)  BP: 109/58 (2019 13:10) (101/52 - 164/89)  BP(mean): --  RR: 23 (2019 13:10) (15 - 23)  SpO2: 100% (2019 13:10) (95% - 100%)  Height (cm): 167.64 ( @ 19:16)  Weight (kg): 108.9 ( @ 19:16)  BMI (kg/m2): 38.8 ( @ 19:16)  BSA (m2): 2.16 ( @ 19:16)    General: well developed well nourished, in no acute distress  Eyes: sclera anicteric, pupils equal and reactive to light  ENMT: buccal mucosa moist, pharynx not injected  Neck: supple, trachea midline  Lungs: clear, no wheeze/rhonchi  Cardiovascular: regular rate and rhythm, S1 S2  Abdomen: soft, nontender, no organomegaly present, bowel sounds normal  Neurological:  alert and oriented x3, Cranial Nerves II-XII grossly intact  Skin:no increased ecchymosis/petechiae/purpura  Lymph Nodes: no palpable cervical/supraclavicular lymph nodes enlargements  Extremities: no cyanosis/clubbing/edema    Labs:       RECENT CULTURES:          RADIOLOGY & ADDITIONAL STUDIES:    Assessment :             Plan :       Continue with present regime .  Approptiate use of antibiotics and adverse effects reviewed.      I have discussed the above plan of care with patient/family in detail. They expressed understanding of the treatment plan . Risks, benefits and alternatives discussed in detail. I have asked if they have any questions or concerns and appropriately addressed them to the best of my ability .      > 45 minutes spent in direct patient care reviewing  the notes, lab data/ imaging , discussion with multidisciplinary team. All questions were addressed and answered to the best of my capacity .    Thank you for allowing me to participate in the care of your patient .      Livan Nunez MD  Infectious Disease  [phone] HPI:  48 y/o F with PMH of DM type 2, Dyslipidemia, Basal Cell CA, Obesity, Anxiety, Depression Right ovarian complex cyst who presented with RLQ non radiating crampy abdominal pain that worsens when she walking, started about 12 noon, then started to have fever with intermittent chills. Denies n/v/d CP SOB urinary symptoms. No recent travel or sick contacts. Has Had Right ovarian cyst followed with [facility]. Last u/s in  which was stable in size. Also had biopsy which was benign. Developed right sided abdominal pain and fever yesterday and came to the hospital to be evaluated. CT AP done in ED unremarkable. Still with right sided abdominal pain.    Infectious Disease consult was called to evaluate pt and for antibiotic management.    Past Medical & Surgical Hx:  PAST MEDICAL & SURGICAL HISTORY:  Diabetes  Gestational diabetes  Basal cell cancer: removed upper back  S/P anal fissurectomy: 10 yrs ago  S/P  section: twice, ,       Social History--  EtOH: denies   Tobacco: denies   Drug Use: denies     FAMILY HISTORY:  FH: HTN (hypertension): father  Family history of diabetes mellitus (DM): Mother      Allergies  IV Contrast (Hives)  latex (Rash)    Home Medications:  Simvastatin 10 milliGRAM(s) Oral at bedtime    Current Inpatient Medications :    ANTIBIOTICS:   cefTRIAXone   IVPB 1000 milliGRAM(s) IV Intermittent every 24 hours  metroNIDAZOLE  IVPB      metroNIDAZOLE  IVPB 500 milliGRAM(s) IV Intermittent every 8 hours      OTHER RELEVANT MEDICATIONS :  acetaminophen   Tablet .. 650 milliGRAM(s) Oral every 6 hours PRN  dextrose 40% Gel 15 Gram(s) Oral once PRN  dextrose 5%. 1000 milliLiter(s) IV Continuous <Continuous>  dextrose 50% Injectable 12.5 Gram(s) IV Push once  dextrose 50% Injectable 25 Gram(s) IV Push once  dextrose 50% Injectable 25 Gram(s) IV Push once  enoxaparin Injectable 40 milliGRAM(s) SubCutaneous daily  glucagon  Injectable 1 milliGRAM(s) IntraMuscular once PRN  HYDROmorphone  Injectable 0.5 milliGRAM(s) IV Push every 3 hours PRN  insulin lispro (HumaLOG) corrective regimen sliding scale   SubCutaneous three times a day before meals  insulin lispro (HumaLOG) corrective regimen sliding scale   SubCutaneous at bedtime  lactated ringers. 1000 milliLiter(s) IV Continuous <Continuous>  oxyCODONE    IR 5 milliGRAM(s) Oral every 4 hours PRN  oxyCODONE    IR 10 milliGRAM(s) Oral every 3 hours PRN    ROS:  CONSTITUTIONAL: + fever or chills  EYES:  Negative  blurry vision or double vision  CARDIOVASCULAR:  Negative for chest pain or palpitations  RESPIRATORY:  Negative for cough, wheezing, or SOB   GASTROINTESTINAL:  Negative for nausea, vomiting, diarrhea, constipation, + abdominal pain  GENITOURINARY:  Negative frequency, urgency , dysuria or hematuria   NEUROLOGIC:  No headache, confusion, dizziness, lightheadedness  All other systems were reviewed and are negative    Physical Exam:  Vital Signs Last 24 Hrs  T(C): 36.5 (2019 13:10), Max: 39.4 (2019 05:15)  T(F): 97.7 (2019 13:10), Max: 103 (2019 05:15)  HR: 105 (2019 13:10) (100 - 140)  BP: 109/58 (2019 13:10) (101/52 - 164/89)  RR: 23 (2019 13:10) (15 - 23)  SpO2: 100% (2019 13:10) (95% - 100%)  Height (cm): 167.64 ( @ 19:16)  Weight (kg): 108.9 ( @ 19:16)  BMI (kg/m2): 38.8 ( @ 19:16)  BSA (m2): 2.16 ( @ 19:16)    General: no acute distress Obese  Eyes: sclera anicteric, pupils equal and reactive to light  ENMT: buccal mucosa moist, pharynx not injected  Neck: supple, trachea midline  Lungs: clear, no wheeze/rhonchi  Cardiovascular: regular rate and rhythm, S1 S2  Abdomen: soft, Right sided abd tender, no organomegaly present, bowel sounds normal  Neurological:  alert and oriented x3, Cranial Nerves II-XII grossly intact  Skin:no increased ecchymosis/petechiae/purpura  Lymph Nodes: no palpable cervical/supraclavicular lymph nodes enlargements  Extremities: no cyanosis/clubbing/edema    Labs:                         11.0   18.53 )-----------( 323      ( 2019 08:13 )             34.7       135  |  102  |  6<L>  ----------------------------<  147<H>  3.3<L>   |  23  |  0.66    Ca    8.4      2019 08:13    TPro  7.9  /  Alb  3.2<L>  /  TBili  0.5  /  DBili  x   /  AST  18  /  ALT  24  /  AlkPhos  122<H>        RECENT CULTURES:  pending    RADIOLOGY & ADDITIONAL STUDIES:    EXAM:  CT ABDOMEN AND PELVIS OC                              PROCEDURE DATE:  2019          INTERPRETATION:  CLINICAL INFORMATION: Right lower quadrant pain and   fever.    PROCEDURE:   Helical axial images were obtained from the domes ofthe diaphragm   through the pubic symphysis without intravenous contrast. Oral contrast   was administered. Coronal and sagittal reformats were also obtained.       COMPARISON: Abdominal CT and pelvic ultrasound 2017.    FINDINGS: Evaluation of the abdominal/pelvic organs, viscera and   vasculature is limited without intravenous contrast.     LOWER CHEST: Subsegmental atelectasis.    LIVER: Again noted, diffuse low-attenuation indicating fatty   infiltration, with stranding near the gallbladder fossa.  GALLBLADDER/BILE DUCTS: No intrahepatic or extrahepatic biliary   dilatation. No radiopaque gallstone.  PANCREAS: Unremarkable.  SPLEEN: Unremarkable.    ADRENALS: Unremarkable.  KIDNEYS/URETERS: No hydronephrosis, hydroureter or significant   perinephric stranding. No radiopaque urinary tract stone.   BLADDER: Partially distended.  REPRODUCTIVE ORGANS: Post surgical changes in the uterus. Again noted,   hypodense lesion in the right adnexa. Unremarkable left adnexa.    BOWEL: No bowelobstruction. Unremarkable appendix. Colon diverticulosis.  PERITONEUM: No drainable fluid collection or free air.  VESSELS: Atherosclerotic change of the abdominal aorta and its branches.   RETROPERITONEUM: No lymphadenopathy.    ABDOMINAL WALL/SOFT TISSUES: Postsurgical changes along the anterior   pelvic wall.  BONES: Degenerative changes of the spine, pubic symphysis and sacroiliac   joints.     IMPRESSION:     No appendicitis.    Persistent right adnexal lesion, which can be further characterized on a   nonemergent contrast enhanced MRI.    Assessment :   48 y/o F with PMH of DM type 2, Dyslipidemia, Basal Cell CA, Obesity, Anxiety, Depression Right ovarian complex cyst admitted with right sided abdominal pain with fever and chills. Denies n/v/d CP SOB urinary symptoms. No recent travel or sick contacts. Has Had Right ovarian cyst followed with her gyn. Last u/s in [facility] which was stable in size. Also had biopsy which was benign. CT AP done in ED unremarkable. Still with right sided abdominal pain. Rule out infected adnexal complex cyst. No signs for appendicitis or biliary pathology. WBC 21K Tmax 103.    Plan :   Zosyn  Trend temps and wbc  MRI pelvis Monday  Fu cultures  Suggest gyn consult    D/w Dr Woods    Continue with present regime .  Approptiate use of antibiotics and adverse effects reviewed.      I have discussed the above plan of care with patient/family in detail. They expressed understanding of the treatment plan . Risks, benefits and alternatives discussed in detail. I have asked if they have any questions or concerns and appropriately addressed them to the best of my ability .      > 45 minutes spent in direct patient care reviewing  the notes, lab data/ imaging , discussion with multidisciplinary team. All questions were addressed and answered to the best of my capacity .    Thank you for allowing me to participate in the care of your patient .      Livan Nunez MD  Infectious Disease  [phone]

## 2019-07-26 NOTE — H&P ADULT - PROBLEM SELECTOR PLAN 1
of unclear origin, CT abdomen & pelvis with PO contrast only as Pt is allergic to IV contrast, didn't pic up a cause, possible pevic infection, admitted to telemetry, pain control, started on Ceftriaxone & Flagyl after cultures were obtained by ED team, Tylenol PRN, trend TLC, f/u culture results, pelvic sonogram in am.

## 2019-07-26 NOTE — H&P ADULT - PROBLEM SELECTOR PLAN 4
Controlled, on Farxiga, Glimepiride, and weekly Trulicity, will hold all, started on corrective insulin Lispro coverage scale before meals & at bedtime, no basal insulin for now as patient already received long acting weekly dose of Trulicity, can consider Lantus insulin later based on F.S and total daily insulin requirements, will check glycohemoglobin level in am.

## 2019-07-26 NOTE — H&P ADULT - PROBLEM SELECTOR PLAN 2
possibly 2ry to pelvic infection as stated above, lactic acid was mildly elevated, received 3400 ML NS bolus by ED team as per sepsis protocol, gave another 1000 ml bolus of LR on admission, will maintain at 150 ml/h, monitor I & O, lactate level was normalized, rest of management as stated above, ID consult with Dr. Webster was called.

## 2019-07-26 NOTE — H&P ADULT - HISTORY OF PRESENT ILLNESS
This is a 50y/o F with PMH of DM type 2, Dyslipidemia, Basal Cell CA, Obesity, Anxiety, and Depression who presented with RLQ non radiating crampy abdominal pain that worsens when she walking, started about 12 noon, then started to have fever with intermittent chills, no urinary symptoms, no nausea, vomiting or change in bowel habits. Patient stated that she is premenstrual, LMP was January 7th, and started again 3 days ago, denies any vaginal discharge or perineal pain. Of note that patient had similar pain before and was diagnosed with right adnexal complex cyst in 2017, was told that she has bleeding inside the cyst. She is following up with her GYN with sonograms on regular basis. This is a 50 y/o F with PMH of DM type 2, Dyslipidemia, Basal Cell CA, Obesity, Anxiety, and Depression who presented with RLQ non radiating crampy abdominal pain that worsens when she walking, started about 12 noon, then started to have fever with intermittent chills, no urinary symptoms, no nausea, vomiting or change in bowel habits. Patient stated that she is premenstrual, LMP was January 7th, and started again 3 days ago, denies any vaginal discharge or perineal pain. Of note that patient had similar pain before and was diagnosed with right adnexal complex cyst in 2017, was told that she has bleeding inside the cyst. She is following up with her GYN with sonograms on regular basis.

## 2019-07-26 NOTE — H&P ADULT - ASSESSMENT
50y/o F with PMH of DM type 2, Dyslipidemia, Basal Cell CA, Obesity, Anxiety, and Depression presented with RLQ abdominal pain with fever. 48 y/o F with PMH of DM type 2, Dyslipidemia, Basal Cell CA, Obesity, Anxiety, and Depression presented with RLQ abdominal pain with fever.

## 2019-07-26 NOTE — H&P ADULT - NSHPPHYSICALEXAM_GEN_ALL_CORE
-    Vital Signs Last 24 Hrs  T(C): 37.6 (25 Jul 2019 23:45), Max: 39.3 (25 Jul 2019 19:16)  T(F): 99.7 (25 Jul 2019 23:45), Max: 102.8 (25 Jul 2019 19:16)  HR: 113 (26 Jul 2019 01:00) (111 - 140)  BP: 123/76 (26 Jul 2019 01:00) (121/63 - 164/89)  BP(mean): --  RR: 19 (26 Jul 2019 01:00) (15 - 22)  SpO2: 99% (26 Jul 2019 01:00) (95% - 99%)        PHYSICAL EXAM:  	  GENERAL: NAD, well-groomed, well-developed, obese.  HEAD:  Atraumatic, Norm cephalic.  EYES: PERRLA, conjunctiva clear.  ENMT: no nasal discharge, no adam-pharyngeal erythema or exudates, MMM.   NECK: Supple, No JVD.  NERVOUS SYSTEM:  Alert & oriented X3, neurologically intact grossly.  CHEST/LUNG: Good air entry B/L, no rales, rhonchi, or wheezing.  HEART: Normal S1 & S2, grade II/VI ZAIN over cardiac base, no propagation, no extra sounds.  ABDOMEN: Soft, lax, obese, mildly tender RLQ, no guarding or rebound tenderness, non-distended; bowel sounds present, no palpable masses (+) firm non tender hepatomegaly, negative Barton's sign.  EXTREMITIES:  No clubbing, cyanosis, or edema.  VASCULAR: 2+ radial, brachial pulses B/L, no carotid bruits.  SKIN: No rashes or lesions.  PSYCH: normal affect & behavior.

## 2019-07-26 NOTE — H&P ADULT - NSHPREVIEWOFSYSTEMS_GEN_ALL_CORE
-    CONSTITUTIONAL: (+) fever, and chills, no flu-like symptoms.  EYES: No eye pain, visual disturbances, or discharge.  ENMT:  No difficulty hearing, tinnitus, vertigo; No sinus or throat pain.  NECK: No pain or stiffness.	  RESPIRATORY: No cough, wheezing, or hemoptysis; No shortness of breath.  CARDIOVASCULAR: No chest pain, palpitations, dizziness, or leg swelling.  GASTROINTESTINAL: (+) abdominal pain, no nausea, vomiting, or hematemesis; No diarrhea or Change in bowel habits. No melena or hematochezia.  GENITOURINARY: No dysuria, frequency, hematuria, or incontinence.  NEUROLOGICAL: No headaches, focal muscle weakness, numbness, or tremors.  SKIN: No itching, burning or rashes.  MUSCULOSKELETAL: No joint swelling or pain.  PSYCHIATRIC: No depression, anxiety, or agitation.  HEME/LYMPH: No easy bruising, bleeding gums, or nose bleed.  ALLERGY AND IMMUNOLOGIC: No hives or eczema.

## 2019-07-26 NOTE — H&P ADULT - PROBLEM SELECTOR PLAN 7
IMPROVE VTE Individual Risk Assessment          RISK                                                          Points    [  ] Previous VTE                                                3  [  ] Thrombophilia                                             2  [  ] Lower limb paralysis                                    2        (unable to hold up >15 seconds)    [  ] Current Cancer                                             2         (within 6 months)  [ x ] Immobilization > 24 hrs    (expected)            1  [  ] ICU/CCU stay > 24 hours                            1  [  ] Age > 60                                                    1    IMPROVE VTE Score 1.    **IMPROVE score of 2 in addition to the other risk factors not included in this scoring system, started her on UFH 5000 units subcutaneous every 8 hours for DVT prophylaxis.

## 2019-07-26 NOTE — H&P ADULT - NSHPLABSRESULTS_GEN_ALL_CORE
-      Lactate Trend   @ 21:58 Lactate:1.6    @ 20:04 Lactate:2.2                           12.6   21.81 )-----------( 404      ( 2019 20:04 )             40.0                131<L>  |  96  |  10  ----------------------------<  168<H>  3.8   |  25  |  0.73    Ca    9.2      2019 20:04    TPro  7.9  /  Alb  3.2<L>  /  TBili  0.5  /  DBili  x   /  AST  18  /  ALT  24  /  AlkPhos  122<H>            Urinalysis Basic - ( 2019 20:57 )    Color: Yellow / Appearance: Slightly Turbid / S.010 / pH: x  Gluc: x / Ketone: Small  / Bili: Negative / Urobili: Negative mg/dL   Blood: x / Protein: 30 mg/dL / Nitrite: Negative   Leuk Esterase: Trace / RBC: >50 /HPF / WBC 0-2   Sq Epi: x / Non Sq Epi: Few / Bacteria: Few      PT/INR - ( 2019 20:04 )   PT: 13.1 sec;   INR: 1.20 ratio       PTT - ( 2019 20:04 )  PTT:33.7 sec          CT ABDOMEN AND PELVIS OC:      COMPARISON: Abdominal CT and pelvic ultrasound 2017.  FINDINGS: Evaluation of the abdominal/pelvic organs, viscera and   vasculature is limited without intravenous contrast.   LOWER CHEST: Subsegmental atelectasis.  LIVER: Again noted, diffuse low-attenuation indicating fatty   infiltration, with stranding near the gallbladder fossa.  GALLBLADDER/BILE DUCTS: No intrahepatic or extrahepatic biliary   dilatation. No radiopaque gallstone.  PANCREAS: Unremarkable.  SPLEEN: Unremarkable.  ADRENALS: Unremarkable.  KIDNEYS/URETERS: No hydronephrosis, hydroureter or significant   perinephric stranding. No radiopaque urinary tract stone.   BLADDER: Partially distended.  REPRODUCTIVE ORGANS: Post surgical changes in the uterus. Again noted,   hypodense lesion in the right adnexa. Unremarkable left adnexa.  BOWEL: No bowelobstruction. Unremarkable appendix. Colon diverticulosis.  PERITONEUM: No drainable fluid collection or free air.  VESSELS: Atherosclerotic change of the abdominal aorta and its branches.   RETROPERITONEUM: No lymphadenopathy.    ABDOMINAL WALL/SOFT TISSUES: Postsurgical changes along the anterior   pelvic wall.  BONES: Degenerative changes of the spine, pubic symphysis and sacroiliac   joints.   IMPRESSION:   No appendicitis.  Persistent right adnexal lesion, which can be further characterized on a   nonemergent contrast enhanced MRI.  Additional findings as described. -      Lactate Trend   @ 21:58 Lactate:1.6    @ 20:04 Lactate:2.2                           12.6   21.81 )-----------( 404      ( 2019 20:04 )             40.0                131<L>  |  96  |  10  ----------------------------<  168<H>  3.8   |  25  |  0.73    Ca    9.2      2019 20:04    TPro  7.9  /  Alb  3.2<L>  /  TBili  0.5  /  DBili  x   /  AST  18  /  ALT  24  /  AlkPhos  122<H>            Urinalysis Basic - ( 2019 20:57 )    Color: Yellow / Appearance: Slightly Turbid / S.010 / pH: x  Gluc: x / Ketone: Small  / Bili: Negative / Urobili: Negative mg/dL   Blood: x / Protein: 30 mg/dL / Nitrite: Negative   Leuk Esterase: Trace / RBC: >50 /HPF / WBC 0-2   Sq Epi: x / Non Sq Epi: Few / Bacteria: Few      PT/INR - ( 2019 20:04 )   PT: 13.1 sec;   INR: 1.20 ratio       PTT - ( 2019 20:04 )  PTT:33.7 sec          CT ABDOMEN AND PELVIS OC:      COMPARISON: Abdominal CT and pelvic ultrasound 2017.  FINDINGS: Evaluation of the abdominal/pelvic organs, viscera and   vasculature is limited without intravenous contrast.   LOWER CHEST: Subsegmental atelectasis.  LIVER: Again noted, diffuse low-attenuation indicating fatty   infiltration, with stranding near the gallbladder fossa.  GALLBLADDER/BILE DUCTS: No intrahepatic or extrahepatic biliary   dilatation. No radiopaque gallstone.  PANCREAS: Unremarkable.  SPLEEN: Unremarkable.  ADRENALS: Unremarkable.  KIDNEYS/URETERS: No hydronephrosis, hydroureter or significant   perinephric stranding. No radiopaque urinary tract stone.   BLADDER: Partially distended.  REPRODUCTIVE ORGANS: Post surgical changes in the uterus. Again noted,   hypodense lesion in the right adnexa. Unremarkable left adnexa.  BOWEL: No bowelobstruction. Unremarkable appendix. Colon diverticulosis.  PERITONEUM: No drainable fluid collection or free air.  VESSELS: Atherosclerotic change of the abdominal aorta and its branches.   RETROPERITONEUM: No lymphadenopathy.    ABDOMINAL WALL/SOFT TISSUES: Postsurgical changes along the anterior   pelvic wall.  BONES: Degenerative changes of the spine, pubic symphysis and sacroiliac   joints.   IMPRESSION:   No appendicitis.  Persistent right adnexal lesion, which can be further characterized on a   nonemergent contrast enhanced MRI.  Additional findings as described.          CXR:	    As per my review shows normal cardiac shadow size, clear lung fields B/L, no pulmonary infiltrates, pleural effusion, or pneumothorax. Pending official report.          EKG:    As per my review shows ST at 120/min, normal PA & QTc intervals, normal QRS voltage, duration, and axis (+75), with normal transition, no ST-T abnormality.          -

## 2019-07-27 DIAGNOSIS — R00.0 TACHYCARDIA, UNSPECIFIED: ICD-10-CM

## 2019-07-27 LAB
ALBUMIN SERPL ELPH-MCNC: 2.4 G/DL — LOW (ref 3.3–5)
ALP SERPL-CCNC: 89 U/L — SIGNIFICANT CHANGE UP (ref 30–120)
ALT FLD-CCNC: 20 U/L DA — SIGNIFICANT CHANGE UP (ref 10–60)
ANION GAP SERPL CALC-SCNC: 9 MMOL/L — SIGNIFICANT CHANGE UP (ref 5–17)
AST SERPL-CCNC: 14 U/L — SIGNIFICANT CHANGE UP (ref 10–40)
BILIRUB SERPL-MCNC: 0.2 MG/DL — SIGNIFICANT CHANGE UP (ref 0.2–1.2)
BUN SERPL-MCNC: 5 MG/DL — LOW (ref 7–23)
CALCIUM SERPL-MCNC: 8.6 MG/DL — SIGNIFICANT CHANGE UP (ref 8.4–10.5)
CHLORIDE SERPL-SCNC: 101 MMOL/L — SIGNIFICANT CHANGE UP (ref 96–108)
CO2 SERPL-SCNC: 26 MMOL/L — SIGNIFICANT CHANGE UP (ref 22–31)
CREAT SERPL-MCNC: 0.59 MG/DL — SIGNIFICANT CHANGE UP (ref 0.5–1.3)
CULTURE RESULTS: SIGNIFICANT CHANGE UP
GLUCOSE SERPL-MCNC: 130 MG/DL — HIGH (ref 70–99)
HBA1C BLD-MCNC: 8.1 % — HIGH (ref 4–5.6)
HCT VFR BLD CALC: 34.6 % — SIGNIFICANT CHANGE UP (ref 34.5–45)
HGB BLD-MCNC: 10.5 G/DL — LOW (ref 11.5–15.5)
MCHC RBC-ENTMCNC: 23.2 PG — LOW (ref 27–34)
MCHC RBC-ENTMCNC: 30.3 GM/DL — LOW (ref 32–36)
MCV RBC AUTO: 76.5 FL — LOW (ref 80–100)
NRBC # BLD: 0 /100 WBCS — SIGNIFICANT CHANGE UP (ref 0–0)
PLATELET # BLD AUTO: 326 K/UL — SIGNIFICANT CHANGE UP (ref 150–400)
POTASSIUM SERPL-MCNC: 3.8 MMOL/L — SIGNIFICANT CHANGE UP (ref 3.5–5.3)
POTASSIUM SERPL-SCNC: 3.8 MMOL/L — SIGNIFICANT CHANGE UP (ref 3.5–5.3)
PROT SERPL-MCNC: 6.8 G/DL — SIGNIFICANT CHANGE UP (ref 6–8.3)
RBC # BLD: 4.52 M/UL — SIGNIFICANT CHANGE UP (ref 3.8–5.2)
RBC # FLD: 18.3 % — HIGH (ref 10.3–14.5)
SODIUM SERPL-SCNC: 136 MMOL/L — SIGNIFICANT CHANGE UP (ref 135–145)
SPECIMEN SOURCE: SIGNIFICANT CHANGE UP
WBC # BLD: 14.88 K/UL — HIGH (ref 3.8–10.5)
WBC # FLD AUTO: 14.88 K/UL — HIGH (ref 3.8–10.5)

## 2019-07-27 PROCEDURE — 99233 SBSQ HOSP IP/OBS HIGH 50: CPT

## 2019-07-27 RX ORDER — LACTOBACILLUS ACIDOPHILUS 100MM CELL
1 CAPSULE ORAL
Refills: 0 | Status: DISCONTINUED | OUTPATIENT
Start: 2019-07-27 | End: 2019-07-30

## 2019-07-27 RX ADMIN — Medication 1 TABLET(S): at 11:37

## 2019-07-27 RX ADMIN — PIPERACILLIN AND TAZOBACTAM 200 GRAM(S): 4; .5 INJECTION, POWDER, LYOPHILIZED, FOR SOLUTION INTRAVENOUS at 14:30

## 2019-07-27 RX ADMIN — SODIUM CHLORIDE 100 MILLILITER(S): 9 INJECTION, SOLUTION INTRAVENOUS at 21:28

## 2019-07-27 RX ADMIN — ENOXAPARIN SODIUM 40 MILLIGRAM(S): 100 INJECTION SUBCUTANEOUS at 11:36

## 2019-07-27 RX ADMIN — SODIUM CHLORIDE 150 MILLILITER(S): 9 INJECTION, SOLUTION INTRAVENOUS at 05:17

## 2019-07-27 RX ADMIN — PIPERACILLIN AND TAZOBACTAM 200 GRAM(S): 4; .5 INJECTION, POWDER, LYOPHILIZED, FOR SOLUTION INTRAVENOUS at 05:17

## 2019-07-27 RX ADMIN — Medication 2: at 12:52

## 2019-07-27 RX ADMIN — Medication 1 TABLET(S): at 17:06

## 2019-07-27 RX ADMIN — PIPERACILLIN AND TAZOBACTAM 200 GRAM(S): 4; .5 INJECTION, POWDER, LYOPHILIZED, FOR SOLUTION INTRAVENOUS at 21:27

## 2019-07-27 RX ADMIN — SIMVASTATIN 10 MILLIGRAM(S): 20 TABLET, FILM COATED ORAL at 21:28

## 2019-07-28 LAB
ANION GAP SERPL CALC-SCNC: 7 MMOL/L — SIGNIFICANT CHANGE UP (ref 5–17)
BASOPHILS # BLD AUTO: 0.03 K/UL — SIGNIFICANT CHANGE UP (ref 0–0.2)
BASOPHILS NFR BLD AUTO: 0.3 % — SIGNIFICANT CHANGE UP (ref 0–2)
BUN SERPL-MCNC: 7 MG/DL — SIGNIFICANT CHANGE UP (ref 7–23)
CALCIUM SERPL-MCNC: 8.5 MG/DL — SIGNIFICANT CHANGE UP (ref 8.4–10.5)
CHLORIDE SERPL-SCNC: 102 MMOL/L — SIGNIFICANT CHANGE UP (ref 96–108)
CO2 SERPL-SCNC: 30 MMOL/L — SIGNIFICANT CHANGE UP (ref 22–31)
CREAT SERPL-MCNC: 0.55 MG/DL — SIGNIFICANT CHANGE UP (ref 0.5–1.3)
EOSINOPHIL # BLD AUTO: 0.12 K/UL — SIGNIFICANT CHANGE UP (ref 0–0.5)
EOSINOPHIL NFR BLD AUTO: 1.1 % — SIGNIFICANT CHANGE UP (ref 0–6)
GLUCOSE SERPL-MCNC: 133 MG/DL — HIGH (ref 70–99)
HCT VFR BLD CALC: 33.7 % — LOW (ref 34.5–45)
HGB BLD-MCNC: 10.2 G/DL — LOW (ref 11.5–15.5)
IMM GRANULOCYTES NFR BLD AUTO: 0.6 % — SIGNIFICANT CHANGE UP (ref 0–1.5)
LYMPHOCYTES # BLD AUTO: 1.78 K/UL — SIGNIFICANT CHANGE UP (ref 1–3.3)
LYMPHOCYTES # BLD AUTO: 16.4 % — SIGNIFICANT CHANGE UP (ref 13–44)
MCHC RBC-ENTMCNC: 23.5 PG — LOW (ref 27–34)
MCHC RBC-ENTMCNC: 30.3 GM/DL — LOW (ref 32–36)
MCV RBC AUTO: 77.6 FL — LOW (ref 80–100)
MONOCYTES # BLD AUTO: 0.76 K/UL — SIGNIFICANT CHANGE UP (ref 0–0.9)
MONOCYTES NFR BLD AUTO: 7 % — SIGNIFICANT CHANGE UP (ref 2–14)
NEUTROPHILS # BLD AUTO: 8.09 K/UL — HIGH (ref 1.8–7.4)
NEUTROPHILS NFR BLD AUTO: 74.6 % — SIGNIFICANT CHANGE UP (ref 43–77)
NRBC # BLD: 0 /100 WBCS — SIGNIFICANT CHANGE UP (ref 0–0)
PLATELET # BLD AUTO: 322 K/UL — SIGNIFICANT CHANGE UP (ref 150–400)
POTASSIUM SERPL-MCNC: 4.1 MMOL/L — SIGNIFICANT CHANGE UP (ref 3.5–5.3)
POTASSIUM SERPL-SCNC: 4.1 MMOL/L — SIGNIFICANT CHANGE UP (ref 3.5–5.3)
RBC # BLD: 4.34 M/UL — SIGNIFICANT CHANGE UP (ref 3.8–5.2)
RBC # FLD: 18.2 % — HIGH (ref 10.3–14.5)
SODIUM SERPL-SCNC: 139 MMOL/L — SIGNIFICANT CHANGE UP (ref 135–145)
WBC # BLD: 10.84 K/UL — HIGH (ref 3.8–10.5)
WBC # FLD AUTO: 10.84 K/UL — HIGH (ref 3.8–10.5)

## 2019-07-28 PROCEDURE — 99233 SBSQ HOSP IP/OBS HIGH 50: CPT

## 2019-07-28 RX ADMIN — Medication 2: at 17:07

## 2019-07-28 RX ADMIN — PIPERACILLIN AND TAZOBACTAM 200 GRAM(S): 4; .5 INJECTION, POWDER, LYOPHILIZED, FOR SOLUTION INTRAVENOUS at 05:27

## 2019-07-28 RX ADMIN — PIPERACILLIN AND TAZOBACTAM 200 GRAM(S): 4; .5 INJECTION, POWDER, LYOPHILIZED, FOR SOLUTION INTRAVENOUS at 22:11

## 2019-07-28 RX ADMIN — Medication 1 TABLET(S): at 12:44

## 2019-07-28 RX ADMIN — SIMVASTATIN 10 MILLIGRAM(S): 20 TABLET, FILM COATED ORAL at 22:11

## 2019-07-28 RX ADMIN — Medication 1 TABLET(S): at 09:32

## 2019-07-28 RX ADMIN — SODIUM CHLORIDE 100 MILLILITER(S): 9 INJECTION, SOLUTION INTRAVENOUS at 05:27

## 2019-07-28 RX ADMIN — ENOXAPARIN SODIUM 40 MILLIGRAM(S): 100 INJECTION SUBCUTANEOUS at 12:44

## 2019-07-28 RX ADMIN — PIPERACILLIN AND TAZOBACTAM 200 GRAM(S): 4; .5 INJECTION, POWDER, LYOPHILIZED, FOR SOLUTION INTRAVENOUS at 13:49

## 2019-07-28 RX ADMIN — Medication 1 TABLET(S): at 17:07

## 2019-07-28 RX ADMIN — Medication 2: at 12:45

## 2019-07-28 NOTE — DIETITIAN INITIAL EVALUATION ADULT. - PERTINENT MEDS FT
48 y/o F with PMH of DM type 2, Dyslipidemia, Basal Cell CA, Obesity, Anxiety, and Depression who presented with RLQ non radiating crampy abdominal pain Of note that patient had similar pain before and was diagnosed with right adnexal complex cyst in 2017, was told that she has bleeding inside the cyst (benign per biopsy). She is following up with her GYN with sonograms on regular basis. Pt found to have sepsis likely 2/2 pelvic infection.  CT AP done in ED unremarkable. Rule out infected adnexal complex cyst. No signs for appendicitis or biliary pathology.ID recs MRI pelvis with leana monday. Noted HgbA1c 8.1. PTA pt was on farxiga, glimperide and trulicity. Diet rx Consistent Carbohydrate, DASH/TLC. 48 y/o F with PMH of DM type 2, Dyslipidemia, Basal Cell CA, Obesity, Anxiety, and Depression who presented with RLQ non radiating crampy abdominal pain Of note that patient had similar pain before and was diagnosed with right adnexal complex cyst in 2017, was told that she has bleeding inside the cyst (benign per biopsy). She is following up with her GYN with sonograms on regular basis. Pt found to have sepsis likely 2/2 pelvic infection.  CT AP done in ED unremarkable. Rule out infected adnexal complex cyst. No signs for appendicitis or biliary pathology. ID recs MRI pelvis with leana monday. Noted HgbA1c 8.1. PTA pt was on farxiga, glimperide and trulicity. Diet rx Consistent Carbohydrate, DASH/TLC. She is tolerating diet without trouble. Pt states that her HgbA1C has improved since starting this regime-she states usually around 7.0% However, now is 8.1%: Pt states she was educated in the past on carbohydrate counting and it appears she is knowledgeable. She did mention that lately she felt she needed to get back on track: Increased intake appears to be cause of increased HgbA1C. Pt denies being on steroids recently.  Briefly reinforced therapeutic diet and also informed her that BG can increase with infection. Continue current diet/insulin coverage zosyn, humalog, dilaudid, oxycodone, zocor, rocephin

## 2019-07-28 NOTE — DIETITIAN INITIAL EVALUATION ADULT. - OTHER INFO
48 y/o F with PMH of DM type 2, Dyslipidemia, Basal Cell CA, Obesity, Anxiety, and Depression who presented with RLQ non radiating crampy abdominal pain Of note that patient had similar pain before and was diagnosed with right adnexal complex cyst in 2017, was told that she has bleeding inside the cyst (benign per biopsy). She is following up with her GYN with sonograms on regular basis. Pt found to have sepsis likely 2/2 pelvic infection.  CT AP done in ED unremarkable. Rule out infected adnexal complex cyst. No signs for appendicitis or biliary pathology. ID recs MRI pelvis with leana monday. Noted HgbA1c 8.1. PTA pt was on farxiga, glimperide and trulicity. Diet rx Consistent Carbohydrate, DASH/TLC. She is tolerating diet without trouble. Pt states that her HgbA1C has improved since starting this regime-she states usually around 7.0% (about 4 months ago) However, as of 7/27/19 is 8.1%: Pt states she was educated in the past on carbohydrate counting and it appears she is knowledgeable. She did mention that lately she felt she needed to get back on track: Increased intake appears to be cause of increased HgbA1C. Pt denies being on steroids recently.  Briefly reinforced therapeutic diet and also informed her that BG can increase with infection. Continue current diet/insulin coverage.

## 2019-07-29 ENCOUNTER — OUTPATIENT (OUTPATIENT)
Dept: OUTPATIENT SERVICES | Facility: HOSPITAL | Age: 49
LOS: 1 days | End: 2019-07-29
Payer: COMMERCIAL

## 2019-07-29 DIAGNOSIS — R50.9 FEVER, UNSPECIFIED: ICD-10-CM

## 2019-07-29 LAB
ANION GAP SERPL CALC-SCNC: 8 MMOL/L — SIGNIFICANT CHANGE UP (ref 5–17)
BASOPHILS # BLD AUTO: 0.03 K/UL — SIGNIFICANT CHANGE UP (ref 0–0.2)
BASOPHILS NFR BLD AUTO: 0.3 % — SIGNIFICANT CHANGE UP (ref 0–2)
BUN SERPL-MCNC: 8 MG/DL — SIGNIFICANT CHANGE UP (ref 7–23)
CALCIUM SERPL-MCNC: 8.6 MG/DL — SIGNIFICANT CHANGE UP (ref 8.4–10.5)
CHLORIDE SERPL-SCNC: 101 MMOL/L — SIGNIFICANT CHANGE UP (ref 96–108)
CO2 SERPL-SCNC: 29 MMOL/L — SIGNIFICANT CHANGE UP (ref 22–31)
CREAT SERPL-MCNC: 0.6 MG/DL — SIGNIFICANT CHANGE UP (ref 0.5–1.3)
EOSINOPHIL # BLD AUTO: 0.08 K/UL — SIGNIFICANT CHANGE UP (ref 0–0.5)
EOSINOPHIL NFR BLD AUTO: 0.8 % — SIGNIFICANT CHANGE UP (ref 0–6)
GLUCOSE SERPL-MCNC: 162 MG/DL — HIGH (ref 70–99)
HCG UR QL: NEGATIVE — SIGNIFICANT CHANGE UP
HCT VFR BLD CALC: 35 % — SIGNIFICANT CHANGE UP (ref 34.5–45)
HGB BLD-MCNC: 10.6 G/DL — LOW (ref 11.5–15.5)
IMM GRANULOCYTES NFR BLD AUTO: 0.5 % — SIGNIFICANT CHANGE UP (ref 0–1.5)
LYMPHOCYTES # BLD AUTO: 1.44 K/UL — SIGNIFICANT CHANGE UP (ref 1–3.3)
LYMPHOCYTES # BLD AUTO: 13.7 % — SIGNIFICANT CHANGE UP (ref 13–44)
MCHC RBC-ENTMCNC: 23.6 PG — LOW (ref 27–34)
MCHC RBC-ENTMCNC: 30.3 GM/DL — LOW (ref 32–36)
MCV RBC AUTO: 77.8 FL — LOW (ref 80–100)
MONOCYTES # BLD AUTO: 0.81 K/UL — SIGNIFICANT CHANGE UP (ref 0–0.9)
MONOCYTES NFR BLD AUTO: 7.7 % — SIGNIFICANT CHANGE UP (ref 2–14)
NEUTROPHILS # BLD AUTO: 8.08 K/UL — HIGH (ref 1.8–7.4)
NEUTROPHILS NFR BLD AUTO: 77 % — SIGNIFICANT CHANGE UP (ref 43–77)
NRBC # BLD: 0 /100 WBCS — SIGNIFICANT CHANGE UP (ref 0–0)
PLATELET # BLD AUTO: 356 K/UL — SIGNIFICANT CHANGE UP (ref 150–400)
POTASSIUM SERPL-MCNC: 3.7 MMOL/L — SIGNIFICANT CHANGE UP (ref 3.5–5.3)
POTASSIUM SERPL-SCNC: 3.7 MMOL/L — SIGNIFICANT CHANGE UP (ref 3.5–5.3)
RBC # BLD: 4.5 M/UL — SIGNIFICANT CHANGE UP (ref 3.8–5.2)
RBC # FLD: 18.2 % — HIGH (ref 10.3–14.5)
SODIUM SERPL-SCNC: 138 MMOL/L — SIGNIFICANT CHANGE UP (ref 135–145)
WBC # BLD: 10.49 K/UL — SIGNIFICANT CHANGE UP (ref 3.8–10.5)
WBC # FLD AUTO: 10.49 K/UL — SIGNIFICANT CHANGE UP (ref 3.8–10.5)

## 2019-07-29 PROCEDURE — 99232 SBSQ HOSP IP/OBS MODERATE 35: CPT

## 2019-07-29 PROCEDURE — A9579: CPT

## 2019-07-29 PROCEDURE — 72197 MRI PELVIS W/O & W/DYE: CPT

## 2019-07-29 PROCEDURE — 81025 URINE PREGNANCY TEST: CPT

## 2019-07-29 PROCEDURE — 72197 MRI PELVIS W/O & W/DYE: CPT | Mod: 26

## 2019-07-29 RX ORDER — ALPRAZOLAM 0.25 MG
0.25 TABLET ORAL ONCE
Refills: 0 | Status: DISCONTINUED | OUTPATIENT
Start: 2019-07-29 | End: 2019-07-29

## 2019-07-29 RX ADMIN — ENOXAPARIN SODIUM 40 MILLIGRAM(S): 100 INJECTION SUBCUTANEOUS at 11:18

## 2019-07-29 RX ADMIN — PIPERACILLIN AND TAZOBACTAM 200 GRAM(S): 4; .5 INJECTION, POWDER, LYOPHILIZED, FOR SOLUTION INTRAVENOUS at 22:56

## 2019-07-29 RX ADMIN — Medication 2: at 12:44

## 2019-07-29 RX ADMIN — Medication 1 TABLET(S): at 12:43

## 2019-07-29 RX ADMIN — SIMVASTATIN 10 MILLIGRAM(S): 20 TABLET, FILM COATED ORAL at 22:56

## 2019-07-29 RX ADMIN — Medication 0.25 MILLIGRAM(S): at 12:42

## 2019-07-29 RX ADMIN — Medication 1 TABLET(S): at 08:11

## 2019-07-29 RX ADMIN — PIPERACILLIN AND TAZOBACTAM 200 GRAM(S): 4; .5 INJECTION, POWDER, LYOPHILIZED, FOR SOLUTION INTRAVENOUS at 15:40

## 2019-07-29 RX ADMIN — PIPERACILLIN AND TAZOBACTAM 200 GRAM(S): 4; .5 INJECTION, POWDER, LYOPHILIZED, FOR SOLUTION INTRAVENOUS at 05:08

## 2019-07-29 RX ADMIN — Medication 1 TABLET(S): at 17:19

## 2019-07-29 RX ADMIN — Medication 2: at 17:18

## 2019-07-29 NOTE — PROGRESS NOTE ADULT - PROBLEM SELECTOR PROBLEM 5
DM2 (diabetes mellitus, type 2)
DM2 (diabetes mellitus, type 2)
Dyslipidemia
DM2 (diabetes mellitus, type 2)

## 2019-07-29 NOTE — PROGRESS NOTE ADULT - PROBLEM SELECTOR PLAN 5
off home medications  monitor Fs 4x day with lispro coverage.
continue statin
off home medications  monitor Fs 4x day with lispro coverage.
off home medications  monitor Fs 4x day with lispro coverage.

## 2019-07-29 NOTE — PROGRESS NOTE ADULT - PROBLEM SELECTOR PLAN 4
imaging has been non-revealing so far.  ?MRI vs. premedication for CT with IV contrast. ID recs MRI monday.  Pain is well controlled. Gets histamine release from morphine and tolerating oxycodone; continue oxycodone and dialudid prn pain scale. no pain today
imaging has been non-revealing so far.  ?MRI vs. premedication for CT with IV contrast. ID recs MRI pelvis with leana monday.  Pain is well controlled. Gets histamine release from morphine and tolerating oxycodone; continue oxycodone and dialudid prn pain scale. no pain today
off home medications  monitor Fs 4x day with lispro coverage.
imaging has been non-revealing so far.  ?MRI vs. premedication for CT with IV contrast. ID recs MRI pelvis with leana monday.  Pain is well controlled. Gets histamine release from morphine and tolerating oxycodone; continue oxycodone and dialudid prn pain scale. no pain today

## 2019-07-29 NOTE — PROGRESS NOTE ADULT - PROBLEM SELECTOR PROBLEM 4
Right lower quadrant abdominal pain
DM2 (diabetes mellitus, type 2)
Right lower quadrant abdominal pain
Right lower quadrant abdominal pain

## 2019-07-29 NOTE — PROGRESS NOTE ADULT - REASON FOR ADMISSION
Abdominal pain.

## 2019-07-29 NOTE — PROGRESS NOTE ADULT - PROBLEM SELECTOR PLAN 7
patient may take own Brintellix if she brings it in.
patient may take own Brintellix if she brings it in.
DVT proph
patient may take own Brintellix if she brings it in.

## 2019-07-29 NOTE — PROGRESS NOTE ADULT - SUBJECTIVE AND OBJECTIVE BOX
ANUM VELAZQUEZ is a 49yFemale , patient examined and chart reviewed.    INTERVAL HPI/ OVERNIGHT EVENTS:   Feeling better. Tmax 101. Denies pain.     PAST MEDICAL & SURGICAL HISTORY:  Diabetes  Gestational diabetes  Basal cell cancer: removed upper back  S/P anal fissurectomy: 10 yrs ago  S/P  section: twice, ,       For details regarding the patient's social history, family history, and other miscellaneous elements, please refer the initial infectious diseases consultation and/or the admitting history and physical examination for this admission.    ROS:  CONSTITUTIONAL:  +fever or chills   EYES:  Negative  blurry vision or double vision  CARDIOVASCULAR:  Negative for chest pain or palpitations  RESPIRATORY:  Negative for cough, wheezing, or SOB   GASTROINTESTINAL:  Negative for nausea, vomiting, diarrhea, constipation, or abdominal pain  GENITOURINARY:  Negative frequency, urgency or dysuria  NEUROLOGIC:  No headache, confusion, dizziness, lightheadedness  All other systems were reviewed and are negative     ALLERGIES:  IV Contrast (Hives)  latex (Rash)      Current inpatient medications :    ANTIBIOTICS/RELEVANT:  lactobacillus acidophilus 1 Tablet(s) Oral three times a day with meals  piperacillin/tazobactam IVPB..      piperacillin/tazobactam IVPB.. 3.375 Gram(s) IV Intermittent every 8 hours      acetaminophen   Tablet .. 650 milliGRAM(s) Oral every 6 hours PRN  dextrose 40% Gel 15 Gram(s) Oral once PRN  dextrose 5%. 1000 milliLiter(s) IV Continuous <Continuous>  dextrose 50% Injectable 12.5 Gram(s) IV Push once  dextrose 50% Injectable 25 Gram(s) IV Push once  dextrose 50% Injectable 25 Gram(s) IV Push once  enoxaparin Injectable 40 milliGRAM(s) SubCutaneous daily  glucagon  Injectable 1 milliGRAM(s) IntraMuscular once PRN  HYDROmorphone  Injectable 0.5 milliGRAM(s) IV Push every 3 hours PRN  insulin lispro (HumaLOG) corrective regimen sliding scale   SubCutaneous three times a day before meals  insulin lispro (HumaLOG) corrective regimen sliding scale   SubCutaneous at bedtime  lactated ringers. 1000 milliLiter(s) IV Continuous <Continuous>  oxyCODONE    IR 5 milliGRAM(s) Oral every 4 hours PRN  oxyCODONE    IR 10 milliGRAM(s) Oral every 3 hours PRN  simvastatin 10 milliGRAM(s) Oral at bedtime      Objective:     @ 07:01  -   @ 07:00  --------------------------------------------------------  IN: 1865 mL / OUT: 0 mL / NET: 1865 mL     @ 07:01  -   @ 21:55  --------------------------------------------------------  IN: 1050 mL / OUT: 0 mL / NET: 1050 mL      T(C): 37.5 (19 @ 21:21), Max: 37.5 (19 @ 21:21)  HR: 110 (19 @ 21:21) (105 - 126)  BP: 137/92 (19 @ 21:21) (107/71 - 148/90)  RR: 18 (19 @ 21:21) (18 - 20)  SpO2: 96% (19 @ 21:21) (95% - 99%)    Physical Exam:  General: in no acute distress Obese  Eyes: sclera anicteric, pupils equal and reactive to light  ENMT: buccal mucosa moist, pharynx not injected  Neck: supple, trachea midline  Lungs: clear, no wheeze/rhonchi  Cardiovascular: regular rate and rhythm, S1 S2  Abdomen: soft, nontender, no organomegaly present, bowel sounds normal  Neurological: alert and oriented x3, Cranial Nerves II-XII grossly intact  Skin: no increased ecchymosis/petechiae/purpura  Lymph Nodes: no palpable cervical/supraclavicular lymph nodes enlargements  Extremities: no cyanosis/clubbing/edema      LABS:                      10.5   14.88 )-----------( 326      ( 2019 07:56 )             34.6           136  |  101  |  5<L>  ----------------------------<  130<H>  3.8   |  26  |  0.59    Ca    8.6      2019 07:56    TPro  6.8  /  Alb  2.4<L>  /  TBili  0.2  /  DBili  x   /  AST  14  /  ALT  20  /  AlkPhos  89          MICROBIOLOGY:    Culture - Urine (collected 2019 10:49)  Source: .Urine  Final Report (2019 16:42):    >=3 organisms. Probable collection contamination.    Culture - Blood (collected 2019 10:47)  Source: .Blood  Preliminary Report (2019 11:00):    No growth to date.    Culture - Blood (collected 2019 10:47)  Source: .Blood  Preliminary Report (2019 11:00):    No growth to date.    RADIOLOGY & ADDITIONAL STUDIES:  EXAM:  CT ABDOMEN AND PELVIS OC                              PROCEDURE DATE:  2019          INTERPRETATION:  CLINICAL INFORMATION: Right lower quadrant pain and   fever.    PROCEDURE:   Helical axial images were obtained from the domes ofthe diaphragm   through the pubic symphysis without intravenous contrast. Oral contrast   was administered. Coronal and sagittal reformats were also obtained.       COMPARISON: Abdominal CT and pelvic ultrasound 2017.    FINDINGS: Evaluation of the abdominal/pelvic organs, viscera and   vasculature is limited without intravenous contrast.     LOWER CHEST: Subsegmental atelectasis.    LIVER: Again noted, diffuse low-attenuation indicating fatty   infiltration, with stranding near the gallbladder fossa.  GALLBLADDER/BILE DUCTS: No intrahepatic or extrahepatic biliary   dilatation. No radiopaque gallstone.  PANCREAS: Unremarkable.  SPLEEN: Unremarkable.    ADRENALS: Unremarkable.  KIDNEYS/URETERS: No hydronephrosis, hydroureter or significant   perinephric stranding. No radiopaque urinary tract stone.   BLADDER: Partially distended.  REPRODUCTIVE ORGANS: Post surgical changes in the uterus. Again noted,   hypodense lesion in the right adnexa. Unremarkable left adnexa.    BOWEL: No bowelobstruction. Unremarkable appendix. Colon diverticulosis.  PERITONEUM: No drainable fluid collection or free air.  VESSELS: Atherosclerotic change of the abdominal aorta and its branches.   RETROPERITONEUM: No lymphadenopathy.    ABDOMINAL WALL/SOFT TISSUES: Postsurgical changes along the anterior   pelvic wall.  BONES: Degenerative changes of the spine, pubic symphysis and sacroiliac   joints.     IMPRESSION:     No appendicitis.    Persistent right adnexal lesion, which can be further characterized on a   nonemergent contrast enhanced MRI.    Assessment :   50 y/o F with PMH of DM type 2, Dyslipidemia, Basal Cell CA, Obesity, Anxiety, Depression Right ovarian complex cyst admitted with right sided abdominal pain with fever and chills. Denies n/v/d CP SOB urinary symptoms. No recent travel or sick contacts. Has Had Right ovarian cyst followed with her gyn. Last u/s in [facility] which was stable in size. Also had biopsy which was benign. CT AP done in ED unremarkable. Still febrile but right sided abdominal pain improved. Rule out infected adnexal complex cyst. No signs for appendicitis or biliary pathology. WBC 21K Tmax 103 on admission but fever and wbc improving.     Plan :   Cont Zosyn  Trend temps and wbc  MRI pelvis Monday  Fu cultures  Suggest gyn consult      Continue with present regiment.  Appropriate use of antibiotics and adverse effects reviewed.      I have discussed the above plan of care with patient/ family in detail. They expressed understanding of the  treatment plan . Risks, benefits and alternatives discussed in detail. I have asked if they have any questions or concerns and appropriately addressed them to the best of my ability .    > 35 minutes were spent in direct patient care reviewing notes, medications ,labs data/ imaging , discussion with multidisciplinary team.    Thank you for allowing me to participate in care of your patient .    Livan Nunez MD  Infectious Disease  [phone]
ANUM VELAZQUEZ is a 49yFemale , patient examined and chart reviewed.    INTERVAL HPI/ OVERNIGHT EVENTS:   Doing well. No further fevers. Denies abd pain.   No events.    PAST MEDICAL & SURGICAL HISTORY:  Diabetes  Gestational diabetes  Basal cell cancer: removed upper back  S/P anal fissurectomy: 10 yrs ago  S/P  section: twice, ,     For details regarding the patient's social history, family history, and other miscellaneous elements, please refer the initial infectious diseases consultation and/or the admitting history and physical examination for this admission.    ROS:  CONSTITUTIONAL:  no fever or chills   EYES:  Negative  blurry vision or double vision  CARDIOVASCULAR:  Negative for chest pain or palpitations  RESPIRATORY:  Negative for cough, wheezing, or SOB   GASTROINTESTINAL:  Negative for nausea, vomiting, diarrhea, constipation, or abdominal pain  GENITOURINARY:  Negative frequency, urgency or dysuria  NEUROLOGIC:  No headache, confusion, dizziness, lightheadedness  All other systems were reviewed and are negative     ALLERGIES:  IV Contrast (Hives)  latex (Rash)      Current inpatient medications :    ANTIBIOTICS/RELEVANT:      piperacillin/tazobactam IVPB.. 3.375 Gram(s) IV Intermittent every 8 hours      MEDICATIONS  (STANDING):  dextrose 5%. 1000 milliLiter(s) (50 mL/Hr) IV Continuous <Continuous>  dextrose 50% Injectable 12.5 Gram(s) IV Push once  dextrose 50% Injectable 25 Gram(s) IV Push once  dextrose 50% Injectable 25 Gram(s) IV Push once  enoxaparin Injectable 40 milliGRAM(s) SubCutaneous daily  insulin lispro (HumaLOG) corrective regimen sliding scale   SubCutaneous three times a day before meals  insulin lispro (HumaLOG) corrective regimen sliding scale   SubCutaneous at bedtime  lactobacillus acidophilus 1 Tablet(s) Oral three times a day with meals  simvastatin 10 milliGRAM(s) Oral at bedtime    MEDICATIONS  (PRN):  acetaminophen   Tablet .. 650 milliGRAM(s) Oral every 6 hours PRN Temp greater or equal to 38.5C (101.3F)  dextrose 40% Gel 15 Gram(s) Oral once PRN Blood Glucose LESS THAN 70 milliGRAM(s)/deciliter  glucagon  Injectable 1 milliGRAM(s) IntraMuscular once PRN Glucose LESS THAN 70 milligrams/deciliter  HYDROmorphone  Injectable 0.5 milliGRAM(s) IV Push every 3 hours PRN Severe Pain (7 - 10)  oxyCODONE    IR 5 milliGRAM(s) Oral every 4 hours PRN Mild Pain (1 - 3)  oxyCODONE    IR 10 milliGRAM(s) Oral every 3 hours PRN Moderate Pain (4 - 6)        Objective:  Vital Signs Last 24 Hrs  T(C): 36.8 (2019 17:00), Max: 37 (2019 00:05)  T(F): 98.2 (2019 17:00), Max: 98.6 (2019 00:05)  HR: 94 (2019 17:00) (80 - 109)  BP: 116/82 (2019 17:00) (105/73 - 146/90)  BP(mean): 109 (2019 00:18) (95 - 109)  RR: 18 (2019 17:00) (18 - 18)  SpO2: 95% (2019 17:00) (95% - 99%)    Physical Exam:  General: in no acute distress Obese  Eyes: sclera anicteric, pupils equal and reactive to light  ENMT: buccal mucosa moist, pharynx not injected  Neck: supple, trachea midline  Lungs: clear, no wheeze/rhonchi  Cardiovascular: regular rate and rhythm, S1 S2  Abdomen: soft, nontender, no organomegaly present, bowel sounds normal  Neurological: alert and oriented x3, Cranial Nerves II-XII grossly intact  Skin: no increased ecchymosis/petechiae/purpura  Lymph Nodes: no palpable cervical/supraclavicular lymph nodes enlargements  Extremities: no cyanosis/clubbing/edema      LABS:                                 10.2   10.84 )-----------( 322      ( 2019 07:42 )             33.7       139  |  102  |  7   ----------------------------<  133<H>  4.1   |  30  |  0.55    Ca    8.5      2019 07:42    TPro  6.8  /  Alb  2.4<L>  /  TBili  0.2  /  DBili  x   /  AST  14  /  ALT  20  /  AlkPhos  89  07-      MICROBIOLOGY:    Culture - Urine (collected 2019 10:49)  Source: .Urine  Final Report (2019 16:42):    >=3 organisms. Probable collection contamination.    Culture - Blood (collected 2019 10:47)  Source: .Blood  Preliminary Report (2019 11:00):    No growth to date.    Culture - Blood (collected 2019 10:47)  Source: .Blood  Preliminary Report (2019 11:00):    No growth to date.    RADIOLOGY & ADDITIONAL STUDIES:  EXAM:  CT ABDOMEN AND PELVIS OC                              PROCEDURE DATE:  2019          INTERPRETATION:  CLINICAL INFORMATION: Right lower quadrant pain and   fever.    PROCEDURE:   Helical axial images were obtained from the domes ofthe diaphragm   through the pubic symphysis without intravenous contrast. Oral contrast   was administered. Coronal and sagittal reformats were also obtained.       COMPARISON: Abdominal CT and pelvic ultrasound 2017.    FINDINGS: Evaluation of the abdominal/pelvic organs, viscera and   vasculature is limited without intravenous contrast.     LOWER CHEST: Subsegmental atelectasis.    LIVER: Again noted, diffuse low-attenuation indicating fatty   infiltration, with stranding near the gallbladder fossa.  GALLBLADDER/BILE DUCTS: No intrahepatic or extrahepatic biliary   dilatation. No radiopaque gallstone.  PANCREAS: Unremarkable.  SPLEEN: Unremarkable.    ADRENALS: Unremarkable.  KIDNEYS/URETERS: No hydronephrosis, hydroureter or significant   perinephric stranding. No radiopaque urinary tract stone.   BLADDER: Partially distended.  REPRODUCTIVE ORGANS: Post surgical changes in the uterus. Again noted,   hypodense lesion in the right adnexa. Unremarkable left adnexa.    BOWEL: No bowelobstruction. Unremarkable appendix. Colon diverticulosis.  PERITONEUM: No drainable fluid collection or free air.  VESSELS: Atherosclerotic change of the abdominal aorta and its branches.   RETROPERITONEUM: No lymphadenopathy.    ABDOMINAL WALL/SOFT TISSUES: Postsurgical changes along the anterior   pelvic wall.  BONES: Degenerative changes of the spine, pubic symphysis and sacroiliac   joints.     IMPRESSION:     No appendicitis.    Persistent right adnexal lesion, which can be further characterized on a   nonemergent contrast enhanced MRI.    Assessment :   50 y/o F with PMH of DM type 2, Dyslipidemia, Basal Cell CA, Obesity, Anxiety, Depression Right ovarian complex cyst admitted with right sided abdominal pain with fever and chills. Denies n/v/d CP SOB urinary symptoms. No recent travel or sick contacts. Has Had Right ovarian cyst followed with her gyn. Last u/s in Febuary which was stable in size. Also had biopsy which was benign. CT AP done in ED unremarkable. Fevers and right sided abdominal pain improved. Rule out infected adnexal complex cyst. No signs for appendicitis or biliary pathology. Clinically doing well.    Plan :   Cont Zosyn  Trend temps and wbc  MRI pelvis Monday  Suggest gyn consult  Further recommendations pending MRI results      Continue with present regiment.  Appropriate use of antibiotics and adverse effects reviewed.      I have discussed the above plan of care with patient/ family in detail. They expressed understanding of the  treatment plan . Risks, benefits and alternatives discussed in detail. I have asked if they have any questions or concerns and appropriately addressed them to the best of my ability .    > 35 minutes were spent in direct patient care reviewing notes, medications ,labs data/ imaging , discussion with multidisciplinary team.    Thank you for allowing me to participate in care of your patient .    Livan Nunez MD  Infectious Disease  [phone]
ANUM VELAZQUEZ is a 49yFemale , patient examined and chart reviewed.    INTERVAL HPI/ OVERNIGHT EVENTS:   Doing well. No further fevers. Denies abd pain.   No events. MRI done today.    PAST MEDICAL & SURGICAL HISTORY:  Diabetes  Gestational diabetes  Basal cell cancer: removed upper back  S/P anal fissurectomy: 10 yrs ago  S/P  section: twice, ,     For details regarding the patient's social history, family history, and other miscellaneous elements, please refer the initial infectious diseases consultation and/or the admitting history and physical examination for this admission.    ROS:  CONSTITUTIONAL:  no fever or chills   EYES:  Negative  blurry vision or double vision  CARDIOVASCULAR:  Negative for chest pain or palpitations  RESPIRATORY:  Negative for cough, wheezing, or SOB   GASTROINTESTINAL:  Negative for nausea, vomiting, diarrhea, constipation, or abdominal pain  GENITOURINARY:  Negative frequency, urgency or dysuria  NEUROLOGIC:  No headache, confusion, dizziness, lightheadedness  All other systems were reviewed and are negative     ALLERGIES:  IV Contrast (Hives)  latex (Rash)      Current inpatient medications :    ANTIBIOTICS/RELEVANT:      piperacillin/tazobactam IVPB.. 3.375 Gram(s) IV Intermittent every 8 hours    MEDICATIONS  (STANDING):  dextrose 5%. 1000 milliLiter(s) (50 mL/Hr) IV Continuous <Continuous>  dextrose 50% Injectable 12.5 Gram(s) IV Push once  dextrose 50% Injectable 25 Gram(s) IV Push once  dextrose 50% Injectable 25 Gram(s) IV Push once  enoxaparin Injectable 40 milliGRAM(s) SubCutaneous daily  insulin lispro (HumaLOG) corrective regimen sliding scale   SubCutaneous three times a day before meals  insulin lispro (HumaLOG) corrective regimen sliding scale   SubCutaneous at bedtime  lactobacillus acidophilus 1 Tablet(s) Oral three times a day with meals  simvastatin 10 milliGRAM(s) Oral at bedtime    MEDICATIONS  (PRN):  acetaminophen   Tablet .. 650 milliGRAM(s) Oral every 6 hours PRN Temp greater or equal to 38.5C (101.3F)  dextrose 40% Gel 15 Gram(s) Oral once PRN Blood Glucose LESS THAN 70 milliGRAM(s)/deciliter  glucagon  Injectable 1 milliGRAM(s) IntraMuscular once PRN Glucose LESS THAN 70 milligrams/deciliter  HYDROmorphone  Injectable 0.5 milliGRAM(s) IV Push every 3 hours PRN Severe Pain (7 - 10)  oxyCODONE    IR 5 milliGRAM(s) Oral every 4 hours PRN Mild Pain (1 - 3)  oxyCODONE    IR 10 milliGRAM(s) Oral every 3 hours PRN Moderate Pain (4 - 6)    Objective:  Vital Signs Last 24 Hrs  T(C): 36.4 (2019 15:36), Max: 37.2 (2019 00:43)  T(F): 97.6 (2019 15:36), Max: 98.9 (2019 00:43)  HR: 92 (2019 15:36) (89 - 96)  BP: 112/77 (2019 15:36) (108/74 - 119/69)  RR: 17 (2019 15:36) (17 - 19)  SpO2: 97% (2019 15:36) (94% - 99%)    Physical Exam:  General: in no acute distress Obese  Eyes: sclera anicteric, pupils equal and reactive to light  ENMT: buccal mucosa moist, pharynx not injected  Neck: supple, trachea midline  Lungs: clear, no wheeze/rhonchi  Cardiovascular: regular rate and rhythm, S1 S2  Abdomen: soft, nontender, no organomegaly present, bowel sounds normal  Neurological: alert and oriented x3, Cranial Nerves II-XII grossly intact  Skin: no increased ecchymosis/petechiae/purpura  Lymph Nodes: no palpable cervical/supraclavicular lymph nodes enlargements  Extremities: no cyanosis/clubbing/edema      LABS:                                 10.6   1049 )-----------( 356      ( 2019 07:29 )             35.0   -    138  |  101  |  8   ----------------------------<  162<H>  3.7   |  29  |  0.60    Ca    8.6      2019 07:29    MICROBIOLOGY:    Culture - Urine (collected 2019 10:49)  Source: .Urine  Final Report (2019 16:42):    >=3 organisms. Probable collection contamination.    Culture - Blood (collected 2019 10:47)  Source: .Blood  Preliminary Report (2019 11:00):    No growth to date.    Culture - Blood (collected 2019 10:47)  Source: .Blood  Preliminary Report (2019 11:00):    No growth to date.    RADIOLOGY & ADDITIONAL STUDIES:    EXAM:  MR PELVIS WAW IC                            PROCEDURE DATE:  2019          INTERPRETATION:  History: Complex right adnexal mass on CT and ultrasound.    Pelvic MR multisequence without with IV contrast.    There is a complex cystic mass in the right adnexa corresponding to CT   and ultrasound abnormality. This demonstrates multiple internal septa and   peripheral and septal enhancement. Normal ovaries not identified. This   likely represents a lesion of ovarian etiology. Differential diagnosis   would include ovarian neoplasm endometrioma, or less likely chronic   tubo-ovarian abscess. Also less likely, a pedunculated necrotic   subserosal uterine fibroid could present a similar appearance. Compared   to CT of 2017 this has increased in size now measures 6.2 x 4.4 cm   previously measured 4.9 x 4.4 cm. Otherwise, the uterus is unremarkable.   No free fluid. No suspicious adenopathy. Bladder not adequately   distended. Sigmoid diverticula, no diverticulitis    Impression: Complex cystic right adnexal mass with interval increase in   size compared to 2017. See discussion above.      Assessment :   50 y/o F with PMH of DM type 2, Dyslipidemia, Basal Cell CA, Obesity, Anxiety, Depression Right ovarian complex cyst admitted with right sided abdominal pain with fever and chills. Denies n/v/d CP SOB urinary symptoms. No recent travel or sick contacts. Has Had Right ovarian cyst followed with her gyn. Last u/s in [facility] which was stable in size. Also had biopsy which was benign. CT AP done in ED unremarkable. Fevers and right sided abdominal pain improved. MRI noted - most likely infected cyst - tuboovarian abscess. Overall clinically better.    Plan :   Cont Zosyn  Trend temps and wbc  Gyn consult  If no intervention to dc home on Augmentin x 14 days  To follow up with her gyn outpt after dc    D/w Dr Salmeron    Continue with present regiment.  Appropriate use of antibiotics and adverse effects reviewed.      I have discussed the above plan of care with patient/ family in detail. They expressed understanding of the  treatment plan . Risks, benefits and alternatives discussed in detail. I have asked if they have any questions or concerns and appropriately addressed them to the best of my ability .    > 35 minutes were spent in direct patient care reviewing notes, medications ,labs data/ imaging , discussion with multidisciplinary team.    Thank you for allowing me to participate in care of your patient .    Livan Nunez MD  Infectious Disease  [phone]
Patient is a 49y old  Female who presents with a chief complaint of Abdominal pain. (2019 01:12)    INTERVAL HPI/OVERNIGHT EVENTS: patient given oxycodone 15mg this am instead of 5mg, at this time has no nausea, lightheadedness or dizziness.   no adverse affect from higher dose noted.  aside from the pain in her RLQ/right abdomen she is only feeling fatigued from not having slept last night.  no cough, sinus pain, chest pain, nausea, diarrhea, constipation, leg swellling or cramping.   no chest pain or palpitations.    MEDICATIONS  (STANDING):  cefTRIAXone   IVPB 1000 milliGRAM(s) IV Intermittent every 24 hours  dextrose 5%. 1000 milliLiter(s) (50 mL/Hr) IV Continuous <Continuous>  dextrose 50% Injectable 12.5 Gram(s) IV Push once  dextrose 50% Injectable 25 Gram(s) IV Push once  dextrose 50% Injectable 25 Gram(s) IV Push once  heparin  Injectable 5000 Unit(s) SubCutaneous every 8 hours  insulin lispro (HumaLOG) corrective regimen sliding scale   SubCutaneous three times a day before meals  insulin lispro (HumaLOG) corrective regimen sliding scale   SubCutaneous at bedtime  lactated ringers. 1000 milliLiter(s) (150 mL/Hr) IV Continuous <Continuous>  metroNIDAZOLE  IVPB      metroNIDAZOLE  IVPB 500 milliGRAM(s) IV Intermittent every 8 hours  potassium chloride    Tablet ER 40 milliEquivalent(s) Oral every 4 hours  simvastatin 10 milliGRAM(s) Oral at bedtime    MEDICATIONS  (PRN):  acetaminophen   Tablet .. 650 milliGRAM(s) Oral every 6 hours PRN Temp greater or equal to 38.5C (101.3F)  dextrose 40% Gel 15 Gram(s) Oral once PRN Blood Glucose LESS THAN 70 milliGRAM(s)/deciliter  glucagon  Injectable 1 milliGRAM(s) IntraMuscular once PRN Glucose LESS THAN 70 milligrams/deciliter  HYDROmorphone  Injectable 0.5 milliGRAM(s) IV Push every 3 hours PRN Severe Pain (7 - 10)  oxyCODONE    IR 5 milliGRAM(s) Oral every 4 hours PRN Mild Pain (1 - 3)  oxyCODONE    IR 10 milliGRAM(s) Oral every 3 hours PRN Moderate Pain (4 - 6)      Allergies  IV Contrast (Hives)  latex (Rash)      REVIEW OF SYSTEMS:  CONSTITUTIONAL: No weight loss, or fatigue  EYES: No eye pain, visual disturbances, or discharge  ENMT:  No difficulty hearing, tinnitus, vertigo; No sinus or throat pain  NECK: No pain or stiffness  BREASTS: No pain, masses, or nipple discharge  RESPIRATORY: No cough, wheezing, chills or hemoptysis; No shortness of breath  CARDIOVASCULAR: No chest pain, palpitations, lightheadedness, or leg swelling  GASTROINTESTINAL: No abdominal or epigastric pain. No nausea, vomiting, or hematemesis; No diarrhea or constipation. No melena or hematochezia.  GENITOURINARY: No dysuria, frequency, hematuria, or incontinence  NEUROLOGICAL: No headaches, memory loss, vertigo, loss of strength, numbness, or tremors  SKIN: No itching, burning, rashes, or lesions   LYMPH NODES: No enlarged glands  ENDOCRINE: No heat or cold intolerance; No hair loss; No polydipsia or polyuria  MUSCULOSKELETAL: No joint pain or swelling; No muscle, back, or extremity pain  PSYCHIATRIC: No depression, anxiety, or mood swings  HEME/LYMPH: No easy bruising, or bleeding gums  ALLERGY AND IMMUNOLOGIC: No hives or eczema    Vital Signs Last 24 Hrs  T(C): 37.1 (2019 08:07), Max: 39.4 (2019 05:15)  T(F): 98.7 (2019 08:07), Max: 103 (2019 05:15)  HR: 100 (2019 08:07) (100 - 140)  BP: 110/52 (2019 08:07) (101/52 - 164/89)  BP(mean): --  RR: 22 (2019 08:07) (15 - 22)  SpO2: 96% (2019 08:07) (95% - 99%)    PHYSICAL EXAM:  GENERAL: NAD, well-groomed, well-developed  HEAD:  Atraumatic, Normocephalic  EYES: conjunctiva and sclera clear  ENMT: Moist mucous membranes  NECK: Supple, No JVD, Normal thyroid  NERVOUS SYSTEM:  Alert & Oriented X3, Good concentration; All 4 extremities mobile, no gross sensory deficits.   CHEST/LUNG: Clear to auscultation bilaterally; No rales, rhonchi, wheezing, or rubs  HEART: Regular rate and rhythm;   ABDOMEN: Soft, Nontender, Nondistended; Bowel sounds present  EXTREMITIES:  2+ Peripheral Pulses, No clubbing, cyanosis, or edema      LABS:                        11.0   18.53 )-----------( 323      ( 2019 08:13 )             34.7     2019 08:13    135    |  102    |  6      ----------------------------<  147    3.3     |  23     |  0.66     Ca    8.4        2019 08:13    TPro  7.9    /  Alb  3.2    /  TBili  0.5    /  DBili  x      /  AST  18     /  ALT  24     /  AlkPhos  122    2019 20:04    PT/INR - ( 2019 20:04 )   PT: 13.1 sec;   INR: 1.20 ratio    PTT - ( 2019 20:04 )  PTT:33.7 sec    Urinalysis Basic - ( 2019 20:57 )  Color: Yellow / Appearance: Slightly Turbid / S.010 / pH: x  Gluc: x / Ketone: Small  / Bili: Negative / Urobili: Negative mg/dL   Blood: x / Protein: 30 mg/dL / Nitrite: Negative   Leuk Esterase: Trace / RBC: >50 /HPF / WBC 0-2   Sq Epi: x / Non Sq Epi: Few / Bacteria: Few      CAPILLARY BLOOD GLUCOSE  POCT Blood Glucose.: 147 mg/dL (2019 08:12)  POCT Blood Glucose.: 140 mg/dL (2019 02:55)      RADIOLOGY & ADDITIONAL TESTS:    Imaging Personally Reviewed:  [x] YES   < from: US Pelvis Complete (19 @ 09:32) >  Uterus measures 10.4 cm in length. Endometrial tissue stripe measures 6   mm.    The right ovary could not be visualized. In the right adnexa a mildly   heterogeneous mass is identified which measures 4.5 x 6.0 x4.6 cm. This   lesion has been previously described on a CT study dated 2017, it may becontiguous with the uterus and represent a fibroid?    Left ovary measures 2.9 x 2.4 x 2.4 cm showing vascular flow.    There is no evidence of free pelvic fluid    IMPRESSION:   Heterogeneous mass right adnexal region could be related to the uterus.  The right ovary is not identified.    If clinically appropriate follow-up with pelvic MRI.      < end of copied text >      Consultant(s) Notes Reviewed:      Care Discussed with Consultants/Other Providers:    Advanced Directives: [ ] DNR  [ ] No feeding tube  [ ] MOLST in chart  [ ] MOLST completed today  [ ] Unknown
Patient is a 49y old  Female who presents with a chief complaint of Abdominal pain. (28 Jul 2019 14:47)      INTERVAL HPI/OVERNIGHT EVENTS: no further fevers and no abdominal pain. Loose stools are improving and formed. Feels well wants to go home.    T(C): 36.9 (07-29-19 @ 10:05), Max: 37.2 (07-29-19 @ 00:43)  HR: 89 (07-29-19 @ 10:05) (88 - 98)  BP: 110/71 (07-29-19 @ 10:05) (107/73 - 119/69)  RR: 18 (07-29-19 @ 10:05) (18 - 19)  SpO2: 97% (07-29-19 @ 10:05) (94% - 99%)  Wt(kg): --  I&O's Summary    28 Jul 2019 07:01  -  29 Jul 2019 07:00  --------------------------------------------------------  IN: 300 mL / OUT: 0 mL / NET: 300 mL        LABS:                        10.6   10.49 )-----------( 356      ( 29 Jul 2019 07:29 )             35.0     07-29    138  |  101  |  8   ----------------------------<  162<H>  3.7   |  29  |  0.60    Ca    8.6      29 Jul 2019 07:29          CAPILLARY BLOOD GLUCOSE      POCT Blood Glucose.: 149 mg/dL (29 Jul 2019 08:08)  POCT Blood Glucose.: 125 mg/dL (28 Jul 2019 21:53)  POCT Blood Glucose.: 197 mg/dL (28 Jul 2019 16:58)            MEDICATIONS  (STANDING):  ALPRAZolam 0.25 milliGRAM(s) Oral once  dextrose 5%. 1000 milliLiter(s) (50 mL/Hr) IV Continuous <Continuous>  dextrose 50% Injectable 12.5 Gram(s) IV Push once  dextrose 50% Injectable 25 Gram(s) IV Push once  dextrose 50% Injectable 25 Gram(s) IV Push once  enoxaparin Injectable 40 milliGRAM(s) SubCutaneous daily  insulin lispro (HumaLOG) corrective regimen sliding scale   SubCutaneous three times a day before meals  insulin lispro (HumaLOG) corrective regimen sliding scale   SubCutaneous at bedtime  lactobacillus acidophilus 1 Tablet(s) Oral three times a day with meals  piperacillin/tazobactam IVPB..      piperacillin/tazobactam IVPB.. 3.375 Gram(s) IV Intermittent every 8 hours  simvastatin 10 milliGRAM(s) Oral at bedtime    MEDICATIONS  (PRN):  acetaminophen   Tablet .. 650 milliGRAM(s) Oral every 6 hours PRN Temp greater or equal to 38.5C (101.3F)  dextrose 40% Gel 15 Gram(s) Oral once PRN Blood Glucose LESS THAN 70 milliGRAM(s)/deciliter  glucagon  Injectable 1 milliGRAM(s) IntraMuscular once PRN Glucose LESS THAN 70 milligrams/deciliter  HYDROmorphone  Injectable 0.5 milliGRAM(s) IV Push every 3 hours PRN Severe Pain (7 - 10)  oxyCODONE    IR 5 milliGRAM(s) Oral every 4 hours PRN Mild Pain (1 - 3)  oxyCODONE    IR 10 milliGRAM(s) Oral every 3 hours PRN Moderate Pain (4 - 6)      REVIEW OF SYSTEMS:  CONSTITUTIONAL: No fever, weight loss, or fatigue  EYES: No eye pain, visual disturbances, or discharge  ENMT:  No difficulty hearing, tinnitus, vertigo; No sinus or throat pain  NECK: No pain or stiffness  RESPIRATORY: No cough, wheezing, chills or hemoptysis; No shortness of breath  CARDIOVASCULAR: No chest pain, palpitations, dizziness, or leg swelling  GASTROINTESTINAL: No abdominal or epigastric pain. No nausea, vomiting, or hematemesis; No diarrhea or constipation. No melena or hematochezia.  GENITOURINARY: No dysuria, frequency, hematuria, or incontinence  NEUROLOGICAL: No headaches, memory loss, loss of strength, numbness, or tremors  SKIN: No itching, burning, rashes, or lesions   LYMPH NODES: No enlarged glands  ENDOCRINE: No heat or cold intolerance; No hair loss  MUSCULOSKELETAL: No joint pain or swelling; No muscle, back, or extremity pain  PSYCHIATRIC: No depression, anxiety, mood swings, or difficulty sleeping  HEME/LYMPH: No easy bruising, or bleeding gums  ALLERGY AND IMMUNOLOGIC: No hives or eczema    RADIOLOGY & ADDITIONAL TESTS:  MRI pelvis with gadolinium  Imaging Personally Reviewed:  [x] YES  [ ] NO    Consultant(s) Notes Reviewed:  [x] YES  [ ] NO    PHYSICAL EXAM:  GENERAL: obese female in NAD  HEAD:  Atraumatic, Normocephalic  EYES: EOMI, PERRLA, conjunctiva and sclera clear  ENMT: No tonsillar erythema, exudates, or enlargement; Moist mucous membranes, Good dentition, No lesions  NECK: Supple, No JVD, Normal thyroid  NERVOUS SYSTEM:  Alert & Oriented X3, Good concentration; nonfocal  CHEST/LUNG: Clear to percussion bilaterally; No rales, rhonchi, wheezing, or rubs  HEART: Regular rate and rhythm; No murmurs, rubs, or gallops  ABDOMEN: Soft, Nontender, Nondistended; Bowel sounds present  EXTREMITIES:  2+ Peripheral Pulses, No clubbing, cyanosis; trace edema  LYMPH: No lymphadenopathy noted  SKIN: No rashes or lesions    Care Discussed with Consultants/Other Providers [x] YES  [ ] NO
Patient is a 49y old  Female who presents with a chief complaint of Abdominal pain. (27 Jul 2019 17:55)      INTERVAL HPI/OVERNIGHT EVENTS: afebrile overnight. no further abdominal pain. some loose stool (semi formed) and better than yesterday. denies chest pain, shortness of breath, palpitations, n/v/d. For MRI tomorrow with gadolinium contrast. some tachycardia with exertion (states baseline HR at rest is 90+). Feels much improved. Noted pitting edema in lower extremities.    T(C): 36.7 (07-28-19 @ 05:00), Max: 37.5 (07-27-19 @ 21:21)  HR: 87 (07-28-19 @ 05:00) (80 - 126)  BP: 105/73 (07-28-19 @ 05:00) (105/73 - 148/90)  RR: 18 (07-28-19 @ 05:00) (18 - 19)  SpO2: 99% (07-28-19 @ 05:00) (96% - 99%)  Wt(kg): --  I&O's Summary    27 Jul 2019 07:01  -  28 Jul 2019 07:00  --------------------------------------------------------  IN: 1050 mL / OUT: 0 mL / NET: 1050 mL        LABS:                        10.2   10.84 )-----------( 322      ( 28 Jul 2019 07:42 )             33.7     07-28    139  |  102  |  7   ----------------------------<  133<H>  4.1   |  30  |  0.55    Ca    8.5      28 Jul 2019 07:42    TPro  6.8  /  Alb  2.4<L>  /  TBili  0.2  /  DBili  x   /  AST  14  /  ALT  20  /  AlkPhos  89  07-27        CAPILLARY BLOOD GLUCOSE      POCT Blood Glucose.: 138 mg/dL (28 Jul 2019 08:04)  POCT Blood Glucose.: 139 mg/dL (27 Jul 2019 21:16)  POCT Blood Glucose.: 132 mg/dL (27 Jul 2019 16:47)  POCT Blood Glucose.: 158 mg/dL (27 Jul 2019 12:14)            MEDICATIONS  (STANDING):  dextrose 5%. 1000 milliLiter(s) (50 mL/Hr) IV Continuous <Continuous>  dextrose 50% Injectable 12.5 Gram(s) IV Push once  dextrose 50% Injectable 25 Gram(s) IV Push once  dextrose 50% Injectable 25 Gram(s) IV Push once  enoxaparin Injectable 40 milliGRAM(s) SubCutaneous daily  insulin lispro (HumaLOG) corrective regimen sliding scale   SubCutaneous three times a day before meals  insulin lispro (HumaLOG) corrective regimen sliding scale   SubCutaneous at bedtime  lactobacillus acidophilus 1 Tablet(s) Oral three times a day with meals  piperacillin/tazobactam IVPB..      piperacillin/tazobactam IVPB.. 3.375 Gram(s) IV Intermittent every 8 hours  simvastatin 10 milliGRAM(s) Oral at bedtime    MEDICATIONS  (PRN):  acetaminophen   Tablet .. 650 milliGRAM(s) Oral every 6 hours PRN Temp greater or equal to 38.5C (101.3F)  dextrose 40% Gel 15 Gram(s) Oral once PRN Blood Glucose LESS THAN 70 milliGRAM(s)/deciliter  glucagon  Injectable 1 milliGRAM(s) IntraMuscular once PRN Glucose LESS THAN 70 milligrams/deciliter  HYDROmorphone  Injectable 0.5 milliGRAM(s) IV Push every 3 hours PRN Severe Pain (7 - 10)  oxyCODONE    IR 5 milliGRAM(s) Oral every 4 hours PRN Mild Pain (1 - 3)  oxyCODONE    IR 10 milliGRAM(s) Oral every 3 hours PRN Moderate Pain (4 - 6)      REVIEW OF SYSTEMS:  CONSTITUTIONAL: No fever, weight loss, or fatigue  EYES: No eye pain, visual disturbances, or discharge  ENMT:  No difficulty hearing, tinnitus, vertigo; No sinus or throat pain  NECK: No pain or stiffness  RESPIRATORY: No cough, wheezing, chills or hemoptysis; No shortness of breath  CARDIOVASCULAR: No chest pain, palpitations, dizziness, or leg swelling  GASTROINTESTINAL: No abdominal or epigastric pain. No nausea, vomiting, or hematemesis; No constipation. No melena or hematochezia. Admits loose stool.  GENITOURINARY: No dysuria, frequency, hematuria, or incontinence  NEUROLOGICAL: No headaches, memory loss, loss of strength, numbness, or tremors  SKIN: No itching, burning, rashes, or lesions   LYMPH NODES: No enlarged glands  ENDOCRINE: No heat or cold intolerance; No hair loss  MUSCULOSKELETAL: No joint pain or swelling; No muscle, back, or extremity pain  PSYCHIATRIC: No depression, anxiety, mood swings, or difficulty sleeping  HEME/LYMPH: No easy bruising, or bleeding gums  ALLERGY AND IMMUNOLOGIC: No hives or eczema    RADIOLOGY & ADDITIONAL TESTS:    Imaging Personally Reviewed:  [x] YES  [ ] NO    Consultant(s) Notes Reviewed:  [x] YES  [ ] NO    PHYSICAL EXAM:  GENERAL: obese female in NAD  HEAD:  Atraumatic, Normocephalic  EYES: EOMI, PERRLA, conjunctiva and sclera clear  ENMT: No tonsillar erythema, exudates, or enlargement; Moist mucous membranes, Good dentition, No lesions  NECK: Supple, No JVD, Normal thyroid  NERVOUS SYSTEM:  Alert & Oriented X3, Good concentration; nonfocal  CHEST/LUNG: Clear to auscultation ion bilaterally; No rales, rhonchi, wheezing, or rubs  HEART: Regular rate and rhythm; No murmurs, rubs, or gallops  ABDOMEN: Soft, mild RLQ pain with deep palpation, Nondistended; Bowel sounds present  EXTREMITIES:  2+ Peripheral Pulses, No clubbing, cyanosis; 1+ pitting edema  LYMPH: No lymphadenopathy noted  SKIN: No rashes or lesions    Care Discussed with Consultants/Other Providers [x] YES  [ ] NO
Patient is a 49y old  Female who presents with a chief complaint of Abdominal pain. (2019 14:38)      INTERVAL HPI/OVERNIGHT EVENTS: feels well at this time. no further abdominal pain. today with one episode of non bloody diarrhea this morning. Patient for MRI on Monday - states she had allergic reaction to iv contrast (hives) 2 years ago. has been following with GYN for adnexal mass and abnormal gyn bleeding, had biopsy which was negative per patient. no chest pain, dyspnea, palpitations, headache, dizziness, nausea or vomiting. states is not requiring pain meds. febrile to 101.2 last night.    T(C): 37.1 (19 @ 05:07), Max: 38.4 (19 @ 21:09)  HR: 108 (19 @ 05:07) (105 - 118)  BP: 107/71 (19 @ 05:07) (107/71 - 139/93)  RR: 20 (19 @ 05:07) (20 - 23)  SpO2: 97% (19 @ 05:07) (95% - 100%)  Wt(kg): --  I&O's Summary    2019 07:01  -  2019 07:00  --------------------------------------------------------  IN: 1865 mL / OUT: 0 mL / NET: 1865 mL        LABS:                        10.5   14.88 )-----------( 326      ( 2019 07:56 )             34.6     27    136  |  101  |  5<L>  ----------------------------<  130<H>  3.8   |  26  |  0.59    Ca    8.6      2019 07:56    TPro  6.8  /  Alb  2.4<L>  /  TBili  0.2  /  DBili  x   /  AST  14  /  ALT  20  /  AlkPhos  89      PT/INR - ( 2019 20:04 )   PT: 13.1 sec;   INR: 1.20 ratio         PTT - ( 2019 20:04 )  PTT:33.7 sec  Urinalysis Basic - ( 2019 20:57 )    Color: Yellow / Appearance: Slightly Turbid / S.010 / pH: x  Gluc: x / Ketone: Small  / Bili: Negative / Urobili: Negative mg/dL   Blood: x / Protein: 30 mg/dL / Nitrite: Negative   Leuk Esterase: Trace / RBC: >50 /HPF / WBC 0-2   Sq Epi: x / Non Sq Epi: Few / Bacteria: Few      CAPILLARY BLOOD GLUCOSE      POCT Blood Glucose.: 132 mg/dL (2019 08:13)  POCT Blood Glucose.: 146 mg/dL (2019 21:05)  POCT Blood Glucose.: 130 mg/dL (2019 16:46)  POCT Blood Glucose.: 181 mg/dL (2019 12:35)        Urinalysis Basic - ( 2019 20:57 )    Color: Yellow / Appearance: Slightly Turbid / S.010 / pH: x  Gluc: x / Ketone: Small  / Bili: Negative / Urobili: Negative mg/dL   Blood: x / Protein: 30 mg/dL / Nitrite: Negative   Leuk Esterase: Trace / RBC: >50 /HPF / WBC 0-2   Sq Epi: x / Non Sq Epi: Few / Bacteria: Few        MEDICATIONS  (STANDING):  dextrose 5%. 1000 milliLiter(s) (50 mL/Hr) IV Continuous <Continuous>  dextrose 50% Injectable 12.5 Gram(s) IV Push once  dextrose 50% Injectable 25 Gram(s) IV Push once  dextrose 50% Injectable 25 Gram(s) IV Push once  enoxaparin Injectable 40 milliGRAM(s) SubCutaneous daily  insulin lispro (HumaLOG) corrective regimen sliding scale   SubCutaneous three times a day before meals  insulin lispro (HumaLOG) corrective regimen sliding scale   SubCutaneous at bedtime  lactated ringers. 1000 milliLiter(s) (150 mL/Hr) IV Continuous <Continuous>  lactobacillus acidophilus 1 Tablet(s) Oral three times a day with meals  piperacillin/tazobactam IVPB..      piperacillin/tazobactam IVPB.. 3.375 Gram(s) IV Intermittent every 8 hours  simvastatin 10 milliGRAM(s) Oral at bedtime    MEDICATIONS  (PRN):  acetaminophen   Tablet .. 650 milliGRAM(s) Oral every 6 hours PRN Temp greater or equal to 38.5C (101.3F)  dextrose 40% Gel 15 Gram(s) Oral once PRN Blood Glucose LESS THAN 70 milliGRAM(s)/deciliter  glucagon  Injectable 1 milliGRAM(s) IntraMuscular once PRN Glucose LESS THAN 70 milligrams/deciliter  HYDROmorphone  Injectable 0.5 milliGRAM(s) IV Push every 3 hours PRN Severe Pain (7 - 10)  oxyCODONE    IR 5 milliGRAM(s) Oral every 4 hours PRN Mild Pain (1 - 3)  oxyCODONE    IR 10 milliGRAM(s) Oral every 3 hours PRN Moderate Pain (4 - 6)      REVIEW OF SYSTEMS:  CONSTITUTIONAL: No fatigue; admits fever  EYES: No eye pain, visual disturbances, or discharge  ENMT:  No difficulty hearing, tinnitus, vertigo; No sinus or throat pain  NECK: No pain or stiffness  RESPIRATORY: No cough, wheezing, chills or hemoptysis; No shortness of breath  CARDIOVASCULAR: No chest pain, palpitations, dizziness, or leg swelling  GASTROINTESTINAL: No abdominal or epigastric pain. No nausea, vomiting, or hematemesis; No constipation. No melena or hematochezia. Admits diarrhea x 1  GENITOURINARY: No dysuria, frequency, hematuria, or incontinence  NEUROLOGICAL: No headaches, memory loss, loss of strength, numbness, or tremors  SKIN: No itching, burning, rashes, or lesions   LYMPH NODES: No enlarged glands  ENDOCRINE: No heat or cold intolerance; No hair loss  MUSCULOSKELETAL: No joint pain or swelling; No muscle, back, or extremity pain  PSYCHIATRIC: No depression, anxiety, mood swings, or difficulty sleeping  HEME/LYMPH: No easy bruising, or bleeding gums  ALLERGY AND IMMUNOLOGIC: No hives or eczema    RADIOLOGY & ADDITIONAL TESTS:   < from: US Pelvis Complete (19 @ 09:32) >    EXAM:  US PELVIC COMPLETE                              PROCEDURE DATE:  2019          INTERPRETATION:  Clinical information: Fever, pain.    Exam type: Transabdominal sonography.    Transverse and longitudinal images obtained. Comparison study dated   2017.    Transverse and longitudinal images obtained. Duplex sonography performed,   color flow Doppler and spectral Doppler images of the left ovary were   obtained.    Uterus measures 10.4 cm in length. Endometrial tissue stripe measures 6   mm.    The right ovary could not be visualized. In the right adnexa a mildly   heterogeneous mass is identified which measures 4.5 x 6.0 x4.6 cm. This   lesion has been previously described on a CT study dated 2017, it may becontiguous with the uterus and represent a fibroid?    Left ovary measures 2.9 x 2.4 x 2.4 cm showing vascular flow.    There is no evidence of free pelvic fluid    IMPRESSION:   Heterogeneous mass right adnexal region could be related to the uterus.  The right ovary is not identified.    If clinically appropriate follow-up with pelvic MRI.        TEA ONEILL M.D.,ATTENDING RADIOLOGIST  This document has been electronically signed. 2019  9:52AM       < end of copied text >      < from: CT Abdomen and Pelvis w/ Oral Cont (19 @ 23:00) >    EXAM:  CT ABDOMEN AND PELVIS OC                              PROCEDURE DATE:  2019          INTERPRETATION:  CLINICAL INFORMATION: Right lower quadrant pain and   fever.    PROCEDURE:   Helical axial images were obtained from the domes ofthe diaphragm   through the pubic symphysis without intravenous contrast. Oral contrast   was administered. Coronal and sagittal reformats were also obtained.       COMPARISON: Abdominal CT and pelvic ultrasound 2017.    FINDINGS: Evaluation of the abdominal/pelvic organs, viscera and   vasculature is limited without intravenous contrast.     LOWER CHEST: Subsegmental atelectasis.    LIVER: Again noted, diffuse low-attenuation indicating fatty   infiltration, with stranding near the gallbladder fossa.  GALLBLADDER/BILE DUCTS: No intrahepatic or extrahepatic biliary   dilatation. No radiopaque gallstone.  PANCREAS: Unremarkable.  SPLEEN: Unremarkable.    ADRENALS: Unremarkable.  KIDNEYS/URETERS: No hydronephrosis, hydroureter or significant   perinephric stranding. No radiopaque urinary tract stone.   BLADDER: Partially distended.  REPRODUCTIVE ORGANS: Post surgical changes in the uterus. Again noted,   hypodense lesion in the right adnexa. Unremarkable left adnexa.    BOWEL: No bowelobstruction. Unremarkable appendix. Colon diverticulosis.  PERITONEUM: No drainable fluid collection or free air.  VESSELS: Atherosclerotic change of the abdominal aorta and its branches.   RETROPERITONEUM: No lymphadenopathy.    ABDOMINAL WALL/SOFT TISSUES: Postsurgical changes along the anterior   pelvic wall.  BONES: Degenerative changes of the spine, pubic symphysis and sacroiliac   joints.     IMPRESSION:     No appendicitis.    Persistent right adnexal lesion, which can be further characterized on a   nonemergent contrast enhanced MRI.    Additional findings as described.      HARISH HERNANDEZ M.D., ATTENDING RADIOLOGIST  This document has been electronically signed. 2019 11:14PM        < end of copied text >    Imaging Personally Reviewed:  [x] YES  [ ] NO    Consultant(s) Notes Reviewed:  [x] YES  [ ] NO    PHYSICAL EXAM:  GENERAL: obese female in NAD  HEAD:  Atraumatic, Normocephalic  EYES: EOMI, PERRLA, conjunctiva and sclera clear  ENMT: No tonsillar erythema, exudates, or enlargement; Moist mucous membranes, Good dentition, No lesions  NECK: Supple, No JVD, Normal thyroid  NERVOUS SYSTEM:  Alert & Oriented X3, nonfocal  CHEST/LUNG: Clear to auscultation bilaterally; No rales, rhonchi, wheezing, or rubs  HEART: Regular rate and rhythm; No murmurs, rubs, or gallops  ABDOMEN: Soft, Nontender, Nondistended; Bowel sounds present  EXTREMITIES:  2+ Peripheral Pulses, No clubbing, cyanosis, or edema  LYMPH: No lymphadenopathy noted  SKIN: No rashes or lesions    Care Discussed with Consultants/Other Providers [x] YES  [ ] NO

## 2019-07-29 NOTE — PROGRESS NOTE ADULT - PROBLEM SELECTOR PLAN 2
likely due to infection as above.   lactate has improved, still with sinus tachy. previous known to have baseline HR in 90s so this is probably due to illness. continue hydration
likely due to infection as above.   lactate has improved, still with sinus tachy. previous known to have baseline HR in 90s so this is probably due to illness. monitor hemodynamics and on telemetry.  will continue antibiotics per ID. s/p IV hydration.
likely due to infection as above.   lactate has improved, still with sinus tachy. previous known to have baseline HR in 90s so this is probably due to illness. monitor hemodynamics and on telemetry.  will continue antibiotics per ID. s/p IV hydration.
likely due to infection as above.   lactate has improved, still with sinus tachy. previous known to have baseline HR in 90s so this is probably due to illness. will continue aggressive IV hydration and monitor hemodynamics.

## 2019-07-29 NOTE — PROGRESS NOTE ADULT - PROBLEM SELECTOR PLAN 1
unclear etiology.  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  await ID consult.   Continue empiric antibiotics  f/u cultures.
unclear etiology. No further fever. 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 ID consult noted. MRI pelvis with leana today.  Continue empiric antibiotics with zosyn, probiotic while on abx - likely with abx associated diarrhea  f/u cultures: NGTD.  d/c planning
unclear etiology. No further fever. 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 ID consult noted. MRI pelvis with leana 7/29  Continue empiric antibiotics with zosyn, probiotic while on abx - likely with abx associated diarrhea  f/u cultures: NGTD.
unclear etiology.  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 ID consult noted.  Continue empiric antibiotics with zosyn, probiotic while on abx - monitor for abx associated diarrhea: if >3 watery stools in a day, check for cdiff  f/u cultures.

## 2019-07-29 NOTE — PROGRESS NOTE ADULT - ASSESSMENT
49F DM2, hyperlipidemia, depression presents with fever and RLQ abdominal pain of unclear etiology  also with sinus tachycardia
49F DM2, hyperlipidemia, depression presents with fever and RLQ abdominal pain of unclear etiology. Also with sinus tachycardia.

## 2019-07-29 NOTE — PROGRESS NOTE ADULT - PROVIDER SPECIALTY LIST ADULT
Hospitalist
Hospitalist
Infectious Disease
Hospitalist
Hospitalist

## 2019-07-29 NOTE — PROGRESS NOTE ADULT - PROBLEM SELECTOR PLAN 6
continue statin
continue statin
patient may take own Brintellix if she brings it in.
continue statin

## 2019-07-29 NOTE — PROGRESS NOTE ADULT - PROBLEM SELECTOR PLAN 3
imaging has been non-revealing so far.  ?MRI vs. premedication for CT with IV contrast.  will await ID recommendations.  Will adjust pain medications (gets histamine release from morphine and tolerating oxycodone so will add higher dose of oxy for moderate pain and switch to dilaudid for severe pain)
likely 2/2 above  dc IV fluids due to pitting edema  monitor on remote telemetry
likely 2/2 above  will dc IV fluids at this time due to pitting edema  monitor on remote telemetry
likely 2/2 above  continue aggressive IV fluids  monitor on remote telemetry

## 2019-07-30 ENCOUNTER — TRANSCRIPTION ENCOUNTER (OUTPATIENT)
Age: 49
End: 2019-07-30

## 2019-07-30 VITALS
TEMPERATURE: 99 F | RESPIRATION RATE: 16 BRPM | DIASTOLIC BLOOD PRESSURE: 77 MMHG | HEART RATE: 99 BPM | OXYGEN SATURATION: 97 % | SYSTOLIC BLOOD PRESSURE: 116 MMHG

## 2019-07-30 LAB
ANION GAP SERPL CALC-SCNC: 8 MMOL/L — SIGNIFICANT CHANGE UP (ref 5–17)
BASOPHILS # BLD AUTO: 0.03 K/UL — SIGNIFICANT CHANGE UP (ref 0–0.2)
BASOPHILS NFR BLD AUTO: 0.3 % — SIGNIFICANT CHANGE UP (ref 0–2)
BUN SERPL-MCNC: 5 MG/DL — LOW (ref 7–23)
CALCIUM SERPL-MCNC: 8.7 MG/DL — SIGNIFICANT CHANGE UP (ref 8.4–10.5)
CHLORIDE SERPL-SCNC: 101 MMOL/L — SIGNIFICANT CHANGE UP (ref 96–108)
CO2 SERPL-SCNC: 29 MMOL/L — SIGNIFICANT CHANGE UP (ref 22–31)
CREAT SERPL-MCNC: 0.68 MG/DL — SIGNIFICANT CHANGE UP (ref 0.5–1.3)
EOSINOPHIL # BLD AUTO: 0.09 K/UL — SIGNIFICANT CHANGE UP (ref 0–0.5)
EOSINOPHIL NFR BLD AUTO: 0.8 % — SIGNIFICANT CHANGE UP (ref 0–6)
GLUCOSE SERPL-MCNC: 179 MG/DL — HIGH (ref 70–99)
HCT VFR BLD CALC: 36.1 % — SIGNIFICANT CHANGE UP (ref 34.5–45)
HGB BLD-MCNC: 10.9 G/DL — LOW (ref 11.5–15.5)
IMM GRANULOCYTES NFR BLD AUTO: 0.6 % — SIGNIFICANT CHANGE UP (ref 0–1.5)
LYMPHOCYTES # BLD AUTO: 1.66 K/UL — SIGNIFICANT CHANGE UP (ref 1–3.3)
LYMPHOCYTES # BLD AUTO: 14.3 % — SIGNIFICANT CHANGE UP (ref 13–44)
MCHC RBC-ENTMCNC: 23.1 PG — LOW (ref 27–34)
MCHC RBC-ENTMCNC: 30.2 GM/DL — LOW (ref 32–36)
MCV RBC AUTO: 76.6 FL — LOW (ref 80–100)
MONOCYTES # BLD AUTO: 0.98 K/UL — HIGH (ref 0–0.9)
MONOCYTES NFR BLD AUTO: 8.4 % — SIGNIFICANT CHANGE UP (ref 2–14)
NEUTROPHILS # BLD AUTO: 8.77 K/UL — HIGH (ref 1.8–7.4)
NEUTROPHILS NFR BLD AUTO: 75.6 % — SIGNIFICANT CHANGE UP (ref 43–77)
NRBC # BLD: 0 /100 WBCS — SIGNIFICANT CHANGE UP (ref 0–0)
PLATELET # BLD AUTO: 406 K/UL — HIGH (ref 150–400)
POTASSIUM SERPL-MCNC: 4.4 MMOL/L — SIGNIFICANT CHANGE UP (ref 3.5–5.3)
POTASSIUM SERPL-SCNC: 4.4 MMOL/L — SIGNIFICANT CHANGE UP (ref 3.5–5.3)
RBC # BLD: 4.71 M/UL — SIGNIFICANT CHANGE UP (ref 3.8–5.2)
RBC # FLD: 17.6 % — HIGH (ref 10.3–14.5)
SODIUM SERPL-SCNC: 138 MMOL/L — SIGNIFICANT CHANGE UP (ref 135–145)
WBC # BLD: 11.6 K/UL — HIGH (ref 3.8–10.5)
WBC # FLD AUTO: 11.6 K/UL — HIGH (ref 3.8–10.5)

## 2019-07-30 PROCEDURE — 93005 ELECTROCARDIOGRAM TRACING: CPT

## 2019-07-30 PROCEDURE — 74176 CT ABD & PELVIS W/O CONTRAST: CPT

## 2019-07-30 PROCEDURE — 99239 HOSP IP/OBS DSCHRG MGMT >30: CPT

## 2019-07-30 PROCEDURE — 85610 PROTHROMBIN TIME: CPT

## 2019-07-30 PROCEDURE — 76856 US EXAM PELVIC COMPLETE: CPT

## 2019-07-30 PROCEDURE — 87040 BLOOD CULTURE FOR BACTERIA: CPT

## 2019-07-30 PROCEDURE — 80048 BASIC METABOLIC PNL TOTAL CA: CPT

## 2019-07-30 PROCEDURE — 96361 HYDRATE IV INFUSION ADD-ON: CPT

## 2019-07-30 PROCEDURE — 99285 EMERGENCY DEPT VISIT HI MDM: CPT | Mod: 25

## 2019-07-30 PROCEDURE — 83605 ASSAY OF LACTIC ACID: CPT

## 2019-07-30 PROCEDURE — 80053 COMPREHEN METABOLIC PANEL: CPT

## 2019-07-30 PROCEDURE — 36415 COLL VENOUS BLD VENIPUNCTURE: CPT

## 2019-07-30 PROCEDURE — 87086 URINE CULTURE/COLONY COUNT: CPT

## 2019-07-30 PROCEDURE — 82962 GLUCOSE BLOOD TEST: CPT

## 2019-07-30 PROCEDURE — 96374 THER/PROPH/DIAG INJ IV PUSH: CPT

## 2019-07-30 PROCEDURE — 81001 URINALYSIS AUTO W/SCOPE: CPT

## 2019-07-30 PROCEDURE — 85027 COMPLETE CBC AUTOMATED: CPT

## 2019-07-30 PROCEDURE — 83036 HEMOGLOBIN GLYCOSYLATED A1C: CPT

## 2019-07-30 PROCEDURE — 71045 X-RAY EXAM CHEST 1 VIEW: CPT

## 2019-07-30 PROCEDURE — 85730 THROMBOPLASTIN TIME PARTIAL: CPT

## 2019-07-30 RX ORDER — LACTOBACILLUS ACIDOPHILUS 100MM CELL
1 CAPSULE ORAL
Qty: 42 | Refills: 0
Start: 2019-07-30 | End: 2019-08-12

## 2019-07-30 RX ORDER — SIMVASTATIN 20 MG/1
1 TABLET, FILM COATED ORAL
Qty: 0 | Refills: 0 | DISCHARGE
Start: 2019-07-30

## 2019-07-30 RX ADMIN — PIPERACILLIN AND TAZOBACTAM 200 GRAM(S): 4; .5 INJECTION, POWDER, LYOPHILIZED, FOR SOLUTION INTRAVENOUS at 06:43

## 2019-07-30 RX ADMIN — Medication 1 TABLET(S): at 08:10

## 2019-07-30 RX ADMIN — Medication 2: at 08:10

## 2019-07-30 NOTE — DISCHARGE NOTE PROVIDER - HOSPITAL COURSE
FROM ADMISSION H+P:     HPI:    This is a 50 y/o F with PMH of DM type 2, Dyslipidemia, Basal Cell CA, Obesity, Anxiety, and Depression who presented with RLQ non radiating crampy abdominal pain that worsens when she walking, started about 12 noon, then started to have fever with intermittent chills, no urinary symptoms, no nausea, vomiting or change in bowel habits. Patient stated that she is premenstrual, LMP was January 7th, and started again 3 days ago, denies any vaginal discharge or perineal pain. Of note that patient had similar pain before and was diagnosed with right adnexal complex cyst in 2017, was told that she has bleeding inside the cyst. She is following up with her GYN with sonograms on regular basis. (26 Jul 2019 01:12)            ---    HOSPITAL COURSE: Patient was admitted for abdominal pain and fevers. Met septic criteria in the ED and placed on rocephin and flagyl. CT abd/pelvis with oral contrast (has IV contrast allergy) showed no acute abdominal pathology and a right adnexal mass (known). US Pelvis also noting this cystic mass. ID was consulted and placed patient on zosyn.         MR Pelvis with contrast was performed, showing: complex cystic mass in the right adnexa corresponding to CT and ultrasound abnormality. This demonstrates multiple internal septa and peripheral and septal enhancement. Normal ovaries not identified. This likely represents a lesion of ovarian etiology. Differential diagnosis would include ovarian neoplasm endometrioma, or less likely chronic tubo-ovarian abscess. Also less likely, a pedunculated necrotic subserosal uterine fibroid could present a similar appearance. Compared to CT of 1/12/2017 this has increased in size now measures 6.2 x 4.4 cm previously measured 4.9 x 4.4 cm. Otherwise, the uterus is unremarkable. No free fluid. No suspicious adenopathy.         GYN Dr. Mendez was consulted and case discussed in detail - states no acute GYN intervention, follow up as outpatient after 2 weeks augmentin.        Patient symptomatically improved throughout hospital course. Leukocytosis resolved, afebrile >48hrs, blood/urine cultures were negative. No further abdominal pain. Had some antibiotic associated diarrhea which improved with probiotics. Stable for discharge home with close outpatient follow up.         Seen and examined on day of discharge. No complaints.        Vital Signs Last 24 Hrs    T(C): 37.2 (30 Jul 2019 05:16), Max: 37.2 (30 Jul 2019 05:16)    T(F): 98.9 (30 Jul 2019 05:16), Max: 98.9 (30 Jul 2019 05:16)    HR: 99 (30 Jul 2019 05:16) (89 - 99)    BP: 116/77 (30 Jul 2019 05:16) (105/67 - 133/83)    RR: 16 (30 Jul 2019 05:16) (16 - 18)    SpO2: 97% (30 Jul 2019 05:16) (95% - 98%)            REVIEW OF SYSTEMS:        Constitutional: No fever, chills, fatigue    Neuro: No headache, numbness, weakness    Resp: No cough, wheezing, shortness of breath    CVS: No chest pain, palpitations, leg swelling    GI: No abdominal pain, nausea, vomiting; loose stools improved    : No dysuria, frequency, incontinence    Skin: No itching, burning, rashes, or lesions     Msk: No joint pain or swelling    Psych: No depression, anxiety, mood swings        PHYSICAL EXAM:    GENERAL: obese female in NAD    HEAD:  Atraumatic, Normocephalic    EYES: EOMI, PERRLA, conjunctiva and sclera clear    ENMT: No tonsillar erythema, exudates, or enlargement; Moist mucous membranes, Good dentition, No lesions    NECK: Supple, No JVD, Normal thyroid    NERVOUS SYSTEM:  Alert & Oriented X3, Good concentration; nonfocal    CHEST/LUNG: Clear to percussion bilaterally; No rales, rhonchi, wheezing, or rubs    HEART: Regular rate and rhythm; No murmurs, rubs, or gallops    ABDOMEN: Soft, Nontender, Nondistended; Bowel sounds present    EXTREMITIES:  2+ Peripheral Pulses, No clubbing, cyanosis; trace edema    LYMPH: No lymphadenopathy noted    SKIN: No rashes or lesions            ---    CONSULTANTS:     ID Dr. Torito Nunez    GYN Dr. Mendez    ---    TIME SPENT:    The total amount of time spent reviewing the hospital notes, laboratory values, imaging findings, assessing/counseling the patient, discussing with consultant physicians, social work, nursing staff took 44 minutes        --- FROM ADMISSION H+P:     HPI:    This is a 48 y/o F with PMH of DM type 2, Dyslipidemia, Basal Cell CA, Obesity, Anxiety, and Depression who presented with RLQ non radiating crampy abdominal pain that worsens when she walking, started about 12 noon, then started to have fever with intermittent chills, no urinary symptoms, no nausea, vomiting or change in bowel habits. Patient stated that she is premenstrual, LMP was January 7th, and started again 3 days ago, denies any vaginal discharge or perineal pain. Of note that patient had similar pain before and was diagnosed with right adnexal complex cyst in 2017, was told that she has bleeding inside the cyst. She is following up with her GYN with sonograms on regular basis. (26 Jul 2019 01:12)            ---    HOSPITAL COURSE: Patient was admitted for abdominal pain and fevers. Met septic criteria in the ED and placed on rocephin and flagyl. CT abd/pelvis with oral contrast (has IV contrast allergy) showed no acute abdominal pathology and a right adnexal mass (known). US Pelvis also noting this cystic mass. ID was consulted and placed patient on zosyn.         MR Pelvis with contrast was performed, showing: complex cystic mass in the right adnexa corresponding to CT and ultrasound abnormality. This demonstrates multiple internal septa and peripheral and septal enhancement. Normal ovaries not identified. This likely represents a lesion of ovarian etiology. Differential diagnosis would include ovarian neoplasm endometrioma, or less likely chronic tubo-ovarian abscess. Also less likely, a pedunculated necrotic subserosal uterine fibroid could present a similar appearance. Compared to CT of 1/12/2017 this has increased in size now measures 6.2 x 4.4 cm previously measured 4.9 x 4.4 cm. Otherwise, the uterus is unremarkable. No free fluid. No suspicious adenopathy.         GYN Dr. Mendez was consulted and case discussed in detail - states no acute GYN intervention, follow up as outpatient after 2 weeks augmentin.        Patient symptomatically improved throughout hospital course. Leukocytosis resolved, afebrile >48hrs, blood/urine cultures were negative. No further abdominal pain. Had some antibiotic associated diarrhea which improved with probiotics. Stable for discharge home with close outpatient follow up.         Seen and examined on day of discharge. No complaints.        Vital Signs Last 24 Hrs    T(C): 37.2 (30 Jul 2019 05:16), Max: 37.2 (30 Jul 2019 05:16)    T(F): 98.9 (30 Jul 2019 05:16), Max: 98.9 (30 Jul 2019 05:16)    HR: 99 (30 Jul 2019 05:16) (89 - 99)    BP: 116/77 (30 Jul 2019 05:16) (105/67 - 133/83)    RR: 16 (30 Jul 2019 05:16) (16 - 18)    SpO2: 97% (30 Jul 2019 05:16) (95% - 98%)            REVIEW OF SYSTEMS:        Constitutional: No fever, chills, fatigue    Neuro: No headache, numbness, weakness    Resp: No cough, wheezing, shortness of breath    CVS: No chest pain, palpitations, leg swelling    GI: No abdominal pain, nausea, vomiting; loose stools improved    : No dysuria, frequency, incontinence    Skin: No itching, burning, rashes, or lesions     Msk: No joint pain or swelling    Psych: No depression, anxiety, mood swings        PHYSICAL EXAM:    GENERAL: obese female in NAD    HEAD:  Atraumatic, Normocephalic    EYES: EOMI, PERRLA, conjunctiva and sclera clear    ENMT: No tonsillar erythema, exudates, or enlargement; Moist mucous membranes, Good dentition, No lesions    NECK: Supple, No JVD, Normal thyroid    NERVOUS SYSTEM:  Alert & Oriented X3, Good concentration; nonfocal    CHEST/LUNG: Clear to percussion bilaterally; No rales, rhonchi, wheezing, or rubs    HEART: Regular rate and rhythm; No murmurs, rubs, or gallops    ABDOMEN: Soft, Nontender, Nondistended; Bowel sounds present    EXTREMITIES:  2+ Peripheral Pulses, No clubbing, cyanosis; trace edema    LYMPH: No lymphadenopathy noted    SKIN: No rashes or lesions            ---    CONSULTANTS:     ID Dr. Torito Nunez    GYN Dr. Mendez    ---    TIME SPENT:    The total amount of time spent reviewing the hospital notes, laboratory values, imaging findings, assessing/counseling the patient, discussing with consultant physicians, social work, nursing staff took 44 minutes        ---    PMD Dr. Sinclair office called to inform of plan for discharge

## 2019-07-30 NOTE — DISCHARGE NOTE PROVIDER - CARE PROVIDER_API CALL
Israel Mendez)  Obstetrics and Gynecology  [address] [place] [address]  Phone: [phone]  Fax: [phone]  Follow Up Time: Israel Mendez)  Obstetrics and Gynecology  [address] [place] [address]  Phone: [phone]  Fax: [phone]  Follow Up Time:     Mark Jones)  MaternalFetal Medicine; Medical Genetics; Obstetrics and Gynecology  [address] [facility], [address]  [place] [address]  Phone: [phone]  Fax: [phone]  Follow Up Time:     Venancio Lagos (DO)  Medicine   Internal Med  [address] [place] [address] [address]  [place] [address]  Phone: [phone]  Fax: [phone]  Follow Up Time:

## 2019-07-30 NOTE — DISCHARGE NOTE NURSING/CASE MANAGEMENT/SOCIAL WORK - NSDCDPATPORTLINK_GEN_ALL_CORE
You can access the "[employer] Patient Portal, offered by [facility], by registering with the following website: [email or web address]

## 2019-07-30 NOTE — DISCHARGE NOTE PROVIDER - PROVIDER TOKENS
PROVIDER:[TOKEN:[[id number]]] PROVIDER:[TOKEN:[[id number]]],PROVIDER:[TOKEN:[[id number]]],PROVIDER:[TOKEN:[[id number]]]

## 2019-07-30 NOTE — DISCHARGE NOTE PROVIDER - NSDCCPCAREPLAN_GEN_ALL_CORE_FT
PRINCIPAL DISCHARGE DIAGNOSIS  Diagnosis: Sepsis, due to unspecified organism  Assessment and Plan of Treatment: resolved with IV antibiotics and fluids. Blood and Urine Cultures were negative. likely due to a pelvic infection. Please continue augmentin as prescribed for 2 weeks. Use probiotics while on antibiotics to prevent antibiotic-associated diarrhea. Please follow up with your GYN.      SECONDARY DISCHARGE DIAGNOSES  Diagnosis: Adnexal mass  Assessment and Plan of Treatment: known adnexal complex cystic mass now increased in size. please follow up with GYN as an outpatient.  MRI Pelvis with IV contrast: There is a complex cystic mass in the right adnexa corresponding to CT and ultrasound abnormality. This demonstrates multiple internal septa and peripheral and septal enhancement. Normal ovaries not identified. This likely represents a lesion of ovarian etiology. Differential diagnosis would include ovarian neoplasm endometrioma, or less likely chronic tubo-ovarian abscess. Also less likely, a pedunculated necrotic subserosal uterine fibroid could present a similar appearance. Compared to CT of 1/12/2017 this has increased in size now measures 6.2 x 4.4 cm previously measured 4.9 x 4.4 cm. Otherwise, the uterus is unremarkable. No free fluid. No suspicious adenopathy. Bladder not adequately distended. Sigmoid diverticula, no diverticulitis  Impression: Complex cystic right adnexal mass with interval increase in   size compared to 1/12/2017. See discussion above.    Diagnosis: DM2 (diabetes mellitus, type 2)  Assessment and Plan of Treatment: continue home regimen    Diagnosis: Dyslipidemia  Assessment and Plan of Treatment: continue statin    Diagnosis: Depression  Assessment and Plan of Treatment: continue trintellix    Diagnosis: Tachycardia  Assessment and Plan of Treatment: back to baseline (HR in 90s). was likely elevated due to infection.

## 2019-07-30 NOTE — DISCHARGE NOTE PROVIDER - NSDCFUADDINST_GEN_ALL_CORE_FT
please follow up with you GYN or the GYN consulted in the hospital (Dr. Mendez info provided)    follow up with PMD Dr. Sinclair

## 2019-07-31 ENCOUNTER — TRANSCRIPTION ENCOUNTER (OUTPATIENT)
Age: 49
End: 2019-07-31

## 2019-07-31 ENCOUNTER — APPOINTMENT (OUTPATIENT)
Dept: INTERNAL MEDICINE | Facility: CLINIC | Age: 49
End: 2019-07-31
Payer: COMMERCIAL

## 2019-07-31 VITALS
OXYGEN SATURATION: 99 % | TEMPERATURE: 98.3 F | BODY MASS INDEX: 39.76 KG/M2 | HEIGHT: 66 IN | SYSTOLIC BLOOD PRESSURE: 120 MMHG | WEIGHT: 247.38 LBS | HEART RATE: 91 BPM | RESPIRATION RATE: 18 BRPM | DIASTOLIC BLOOD PRESSURE: 68 MMHG

## 2019-07-31 LAB
CULTURE RESULTS: SIGNIFICANT CHANGE UP
CULTURE RESULTS: SIGNIFICANT CHANGE UP
SPECIMEN SOURCE: SIGNIFICANT CHANGE UP
SPECIMEN SOURCE: SIGNIFICANT CHANGE UP

## 2019-07-31 PROCEDURE — 99495 TRANSJ CARE MGMT MOD F2F 14D: CPT | Mod: 25

## 2019-07-31 PROCEDURE — 99214 OFFICE O/P EST MOD 30 MIN: CPT | Mod: 25

## 2019-07-31 NOTE — HISTORY OF PRESENT ILLNESS
[FreeTextEntry1] : hospital follow-up  [de-identified] : Patient is a 49 year old female with a past medical history as below who presents for hospital follow-up. Patient was admitted to [facility] on 7/25/19 for sepsis and discharged yesterday (7/30/19). Patient noted severe right, lower abdominal cramping and a fever of 102 on 7/25. She was started on IV antibiotics. She notes seeing infectious disease specialist, Dr. Son while at the hospital. Patient notes having an US, chest X-Ray, CT scan, and MRI of abdomen/pelvis. MRI revealed possible pelvic infection. Patient was started on Augmentin yesterday and will complete the course of antibiotics in 2 weeks. She is also currently on a Probiotic TID. She still notes some minor abdominal pain. Patient states she will be following up with her gynecologist, Dr. Peraza. She currently notes a yeast infection/white vaginal discharge likely secondary to being on antibiotics and requests Rx for Diflucan. Right adnexa cystic mass was first diagnosed 2 years ago. Patient notes the cyst has been monitored with routine imaging/testing since the initial diagnosis. Patient notes endometrial biopsy in January 2019 that was normal. Patient states last menstrual cycle was in January, but notes vaginal bleeding 2 days ago. She notes not starting Trintellix following her last visit with SWAPNIL Jaime, but states anxiety symptoms have resolved. She is taking all other medications as prescribed and denies any adverse reactions or side effects.

## 2019-07-31 NOTE — ASSESSMENT
[FreeTextEntry1] : Patient is a 49 year old female with a past medical history as above who presents for hospital follow-up.

## 2019-07-31 NOTE — PHYSICAL EXAM
[No Acute Distress] : no acute distress [Well Nourished] : well nourished [Well Developed] : well developed [Well-Appearing] : well-appearing [Normal Sclera/Conjunctiva] : normal sclera/conjunctiva [PERRL] : pupils equal round and reactive to light [EOMI] : extraocular movements intact [Normal Outer Ear/Nose] : the outer ears and nose were normal in appearance [Normal Oropharynx] : the oropharynx was normal [No JVD] : no jugular venous distention [No Lymphadenopathy] : no lymphadenopathy [Thyroid Normal, No Nodules] : the thyroid was normal and there were no nodules present [Supple] : supple [No Respiratory Distress] : no respiratory distress  [No Accessory Muscle Use] : no accessory muscle use [Clear to Auscultation] : lungs were clear to auscultation bilaterally [Normal Rate] : normal rate  [Regular Rhythm] : with a regular rhythm [Normal S1, S2] : normal S1 and S2 [No Murmur] : no murmur heard [No Carotid Bruits] : no carotid bruits [No Abdominal Bruit] : a ~M bruit was not heard ~T in the abdomen [No Varicosities] : no varicosities [Pedal Pulses Present] : the pedal pulses are present [No Edema] : there was no peripheral edema [No Palpable Aorta] : no palpable aorta [No Extremity Clubbing/Cyanosis] : no extremity clubbing/cyanosis [Soft] : abdomen soft [Non-distended] : non-distended [Non Tender] : non-tender [No Masses] : no abdominal mass palpated [No HSM] : no HSM [Normal Bowel Sounds] : normal bowel sounds [Normal Posterior Cervical Nodes] : no posterior cervical lymphadenopathy [No CVA Tenderness] : no CVA  tenderness [Normal Anterior Cervical Nodes] : no anterior cervical lymphadenopathy [No Spinal Tenderness] : no spinal tenderness [No Joint Swelling] : no joint swelling [Grossly Normal Strength/Tone] : grossly normal strength/tone [No Rash] : no rash [Coordination Grossly Intact] : coordination grossly intact [No Focal Deficits] : no focal deficits [Deep Tendon Reflexes (DTR)] : deep tendon reflexes were 2+ and symmetric [Normal Gait] : normal gait [Normal Affect] : the affect was normal [Normal Insight/Judgement] : insight and judgment were intact [de-identified] : obese

## 2019-07-31 NOTE — PLAN
[FreeTextEntry1] : Gynecology\par pelvic infection - continue Augmentin 875-125mg p.o.q.d. as directed for 2 weeks and Probiotic TID p.o.q.d. - follow up with gynecologist, Dr. Peraza \par Urology\par vaginal yeast infection - take Fluconazole 150mg p.o. as directed, Rx filled\par Endocrinology\par diabetes - continue Trulicity 1.5mg/0.5mL subcutaneous injections q.w. as directed, Farxiga 10mg p.o.q.d., and Glimepiride 1mg BID p.o.q.d. as directed - continue low carbohydrate/low sugar diet \par Endocrinology\par hyperlipidemia - continue Simvastatin 10mg p.o.q.h.s. - continue low cholesterol/low fat diet \par \par

## 2019-08-02 ENCOUNTER — APPOINTMENT (OUTPATIENT)
Dept: OBGYN | Facility: CLINIC | Age: 49
End: 2019-08-02
Payer: COMMERCIAL

## 2019-08-02 PROCEDURE — 99214 OFFICE O/P EST MOD 30 MIN: CPT

## 2019-08-02 PROCEDURE — 36415 COLL VENOUS BLD VENIPUNCTURE: CPT

## 2019-08-26 ENCOUNTER — APPOINTMENT (OUTPATIENT)
Dept: GYNECOLOGIC ONCOLOGY | Facility: CLINIC | Age: 49
End: 2019-08-26
Payer: COMMERCIAL

## 2019-08-26 VITALS
DIASTOLIC BLOOD PRESSURE: 70 MMHG | HEIGHT: 66 IN | SYSTOLIC BLOOD PRESSURE: 120 MMHG | WEIGHT: 243 LBS | BODY MASS INDEX: 39.05 KG/M2

## 2019-08-26 DIAGNOSIS — Z78.9 OTHER SPECIFIED HEALTH STATUS: ICD-10-CM

## 2019-08-26 DIAGNOSIS — Z80.3 FAMILY HISTORY OF MALIGNANT NEOPLASM OF BREAST: ICD-10-CM

## 2019-08-26 PROCEDURE — 99245 OFF/OP CONSLTJ NEW/EST HI 55: CPT

## 2019-08-26 RX ORDER — AMOXICILLIN AND CLAVULANATE POTASSIUM 875; 125 MG/1; 1/1
875-125 TABLET, FILM COATED ORAL
Refills: 0 | Status: DISCONTINUED | COMMUNITY
End: 2019-08-26

## 2019-08-30 ENCOUNTER — APPOINTMENT (OUTPATIENT)
Dept: OBGYN | Facility: CLINIC | Age: 49
End: 2019-08-30
Payer: COMMERCIAL

## 2019-08-30 PROCEDURE — 99214 OFFICE O/P EST MOD 30 MIN: CPT

## 2019-09-03 ENCOUNTER — APPOINTMENT (OUTPATIENT)
Dept: INTERNAL MEDICINE | Facility: CLINIC | Age: 49
End: 2019-09-03
Payer: COMMERCIAL

## 2019-09-03 VITALS
HEART RATE: 64 BPM | SYSTOLIC BLOOD PRESSURE: 156 MMHG | HEIGHT: 66 IN | TEMPERATURE: 98.2 F | BODY MASS INDEX: 40.35 KG/M2 | DIASTOLIC BLOOD PRESSURE: 96 MMHG | OXYGEN SATURATION: 97 % | WEIGHT: 251.06 LBS | RESPIRATION RATE: 14 BRPM

## 2019-09-03 VITALS — SYSTOLIC BLOOD PRESSURE: 130 MMHG | DIASTOLIC BLOOD PRESSURE: 76 MMHG

## 2019-09-03 DIAGNOSIS — G47.39 OTHER SLEEP APNEA: ICD-10-CM

## 2019-09-03 PROCEDURE — 99214 OFFICE O/P EST MOD 30 MIN: CPT

## 2019-09-03 NOTE — PLAN
[FreeTextEntry1] : Ophthalmology\par Recommended follow up with ophthalmologist for annual diabetic eye exam after surgery in early October\par Pulmonary\par sleep apnea symptoms and risk factors - referred to pulmonologist, Dr. Lopez for further evaluation/sleep study - discussed benefits of using CPAP or mandibular advancement device pending results of sleep study \par Endocrinology\par diabetes - continue Trulicity 1.5mg/0.5mL subcutaneous injections q.w. as directed, Farxiga 10mg p.o.q.d., and Glimepiride 1mg BID p.o.q.d. as directed - continue low carbohydrate/low sugar diet \par hyperlipidemia - continue Simvastatin 10mg p.o.q.h.s. - continue low cholesterol/low fat diet \par obesity - continue low carbohydrate diet and CV exercise\par Gynecology\par right ovarian cyst - follow up with Dr. Salinas regarding excision \par Patient to have breast imaging done per her gynecologist \par Gastroenterology\par screening colonoscopy - referred to gastroenterologists, Dr. Fuentes/Dr. Agustin as patient is due\par  \par Patient to follow up for pre-operative clearance for cyst excision. \par Patient to follow up for fasting blood work.

## 2019-09-03 NOTE — PHYSICAL EXAM
[No Acute Distress] : no acute distress [Well Nourished] : well nourished [Well Developed] : well developed [Well-Appearing] : well-appearing [Normal Sclera/Conjunctiva] : normal sclera/conjunctiva [PERRL] : pupils equal round and reactive to light [EOMI] : extraocular movements intact [Normal Oropharynx] : the oropharynx was normal [Normal Outer Ear/Nose] : the outer ears and nose were normal in appearance [No JVD] : no jugular venous distention [No Lymphadenopathy] : no lymphadenopathy [Supple] : supple [Thyroid Normal, No Nodules] : the thyroid was normal and there were no nodules present [No Respiratory Distress] : no respiratory distress  [Clear to Auscultation] : lungs were clear to auscultation bilaterally [No Accessory Muscle Use] : no accessory muscle use [Normal Rate] : normal rate  [Regular Rhythm] : with a regular rhythm [Normal S1, S2] : normal S1 and S2 [No Murmur] : no murmur heard [No Abdominal Bruit] : a ~M bruit was not heard ~T in the abdomen [No Carotid Bruits] : no carotid bruits [No Varicosities] : no varicosities [Pedal Pulses Present] : the pedal pulses are present [No Edema] : there was no peripheral edema [No Extremity Clubbing/Cyanosis] : no extremity clubbing/cyanosis [No Palpable Aorta] : no palpable aorta [Soft] : abdomen soft [Non-distended] : non-distended [No Masses] : no abdominal mass palpated [No HSM] : no HSM [Normal Bowel Sounds] : normal bowel sounds [Normal Posterior Cervical Nodes] : no posterior cervical lymphadenopathy [Normal Anterior Cervical Nodes] : no anterior cervical lymphadenopathy [No CVA Tenderness] : no CVA  tenderness [No Spinal Tenderness] : no spinal tenderness [No Joint Swelling] : no joint swelling [Grossly Normal Strength/Tone] : grossly normal strength/tone [No Rash] : no rash [Coordination Grossly Intact] : coordination grossly intact [No Focal Deficits] : no focal deficits [Normal Gait] : normal gait [Deep Tendon Reflexes (DTR)] : deep tendon reflexes were 2+ and symmetric [Normal Affect] : the affect was normal [Normal Insight/Judgement] : insight and judgment were intact [de-identified] : obese; mild diffuse tenderness with palpitation, no rebound or guarding

## 2019-09-03 NOTE — REVIEW OF SYSTEMS
[Fatigue] : fatigue [Back Pain] : back pain [Insomnia] : insomnia [Negative] : Heme/Lymph [Abdominal Pain] : abdominal pain [FreeTextEntry7] : abdominal cramping  [FreeTextEntry8] : irregular menstrual cycles  [FreeTextEntry9] : lower back pain

## 2019-09-03 NOTE — ASSESSMENT
[FreeTextEntry1] : Patient is a 49 year old female with a past medical history as above who presents for sleep apnea and general follow-up.

## 2019-09-03 NOTE — HISTORY OF PRESENT ILLNESS
[FreeTextEntry1] : sleep apnea and general follow-up  [de-identified] : Patient is a 49 year old female with a past medical history as below who presents for sleep apnea evaluation and general follow-up. Patient notes seeing gynecologic oncologist, Dr. Salinas on 8/26/19 regarding a right ovarian cyst. She also notes following up with her gynecologist, Dr. Peraza. She states prior testing revealed the cyst is complex and therefore a surgical excision will be scheduled for early October 2019. Patient states Dr. Salinas was also concerned about possible sleep apnea. She notes feeling fatigued during the day as she states she does not sleep well. She notes she may snore. Patient notes diffuse abdominal cramping/pain and lower back pain. She states her last menstrual cycle was in January. Patient states she is due for her annual diabetic eye exam. She requests a referral to a gastroenterologist for a screening colonoscopy as she is due. She states she has an Rx for breast imaging per her gynecologist.

## 2019-09-03 NOTE — ADDENDUM
[FreeTextEntry1] : I, Aneudy Robert, acted solely as scribe for Dr. Venancio Lagos DO on this date 09/03/2019  9:50AM .\par \par All medical record entries made by the Scribe were at my, Dr. Venancio Lagos DO direction and personally dictated by me on 09/03/2019  9:50AM. I have reviewed the chart and agree that the record accurately reflects my personal performance of the history, physical exam, assessment and plan. I have also personally directed, reviewed and agreed with the chart.\par

## 2019-09-05 ENCOUNTER — APPOINTMENT (OUTPATIENT)
Dept: INTERNAL MEDICINE | Facility: CLINIC | Age: 49
End: 2019-09-05
Payer: COMMERCIAL

## 2019-09-05 VITALS
SYSTOLIC BLOOD PRESSURE: 132 MMHG | WEIGHT: 247.5 LBS | HEART RATE: 98 BPM | RESPIRATION RATE: 99 BRPM | DIASTOLIC BLOOD PRESSURE: 78 MMHG | BODY MASS INDEX: 39.77 KG/M2 | TEMPERATURE: 98.5 F | HEIGHT: 66 IN | OXYGEN SATURATION: 98 %

## 2019-09-05 DIAGNOSIS — Z00.00 ENCOUNTER FOR GENERAL ADULT MEDICAL EXAMINATION W/OUT ABNORMAL FINDINGS: ICD-10-CM

## 2019-09-05 PROCEDURE — 36415 COLL VENOUS BLD VENIPUNCTURE: CPT

## 2019-09-05 PROCEDURE — 93000 ELECTROCARDIOGRAM COMPLETE: CPT

## 2019-09-05 PROCEDURE — 99214 OFFICE O/P EST MOD 30 MIN: CPT | Mod: 25

## 2019-09-05 NOTE — PAST MEDICAL HISTORY
[Perimenopausal] : hx of being perimenopausal [Menarche Age ____] : age at menarche was [unfilled] [Irregular Cycle Intervals] : are  irregular [Total Preg ___] : G: [unfilled] [Full Term ___] : Full Term: [unfilled]  [Live Births___] : P: [unfilled] [Abortions ___] : Abortions: [unfilled] [AB Spont ___] : miscarriage(s): [unfilled]

## 2019-09-05 NOTE — PLAN
No [FreeTextEntry1] : Cardiopulmonary  - atypical chest pressure -  We reviewed and discussed the EKG  in full detail.  Reviewed echo from 3/19 Patient was advised the importance of participating in aerobic exercise programs improve their stamina.  ANUM was encourage to start an exercise program \par \par GI- abd discomfort -  reviewed MRI, CT scan and US done last month.  See above for details.  \par Advised blend diet.  Check labs,  Check H.pylori breath test.  Has an apt tomorrow  with GI for evaluation of a colonoscopy.  start omeprazole 40 mg daily.  Discontinue ETOH intake \par \par Endocrinology  -  Obesity   Patient was educated about the importance of diet and exercise.   We discussed  a goal of a BMI near 25.   Patient was advised different treatment modalities including prescription medication as well as surgical procedures. \par \par Diabetes- continue with current medications. Check lbs and a1c.   Last eye exam 9/17 \par \par Reviewed hospital labs and MRI, Ct scan \par \par GYN pelvic cyst  follow up with gyn. Called gyn for last PAP results.  \par \par immunization  follow up next month for pneumonia  and flu shot

## 2019-09-05 NOTE — PHYSICAL EXAM
[No Murmur] : no murmur heard [Non Tender] : non-tender [No Acute Distress] : no acute distress [Well Nourished] : well nourished [Well-Appearing] : well-appearing [Well Developed] : well developed [PERRL] : pupils equal round and reactive to light [Normal Sclera/Conjunctiva] : normal sclera/conjunctiva [Normal Outer Ear/Nose] : the outer ears and nose were normal in appearance [EOMI] : extraocular movements intact [No JVD] : no jugular venous distention [Normal Oropharynx] : the oropharynx was normal [Thyroid Normal, No Nodules] : the thyroid was normal and there were no nodules present [No Lymphadenopathy] : no lymphadenopathy [Supple] : supple [No Respiratory Distress] : no respiratory distress  [No Accessory Muscle Use] : no accessory muscle use [Clear to Auscultation] : lungs were clear to auscultation bilaterally [Normal Rate] : normal rate  [Regular Rhythm] : with a regular rhythm [Normal S1, S2] : normal S1 and S2 [No Carotid Bruits] : no carotid bruits [No Abdominal Bruit] : a ~M bruit was not heard ~T in the abdomen [No Varicosities] : no varicosities [No Edema] : there was no peripheral edema [Pedal Pulses Present] : the pedal pulses are present [No Palpable Aorta] : no palpable aorta [No Extremity Clubbing/Cyanosis] : no extremity clubbing/cyanosis [Soft] : abdomen soft [Non-distended] : non-distended [No HSM] : no HSM [No Masses] : no abdominal mass palpated [Normal Bowel Sounds] : normal bowel sounds [Normal Anterior Cervical Nodes] : no anterior cervical lymphadenopathy [Normal Posterior Cervical Nodes] : no posterior cervical lymphadenopathy [No Spinal Tenderness] : no spinal tenderness [No CVA Tenderness] : no CVA  tenderness [Grossly Normal Strength/Tone] : grossly normal strength/tone [No Joint Swelling] : no joint swelling [Normal Gait] : normal gait [No Rash] : no rash [Coordination Grossly Intact] : coordination grossly intact [No Focal Deficits] : no focal deficits [Deep Tendon Reflexes (DTR)] : deep tendon reflexes were 2+ and symmetric [Normal Affect] : the affect was normal [Normal Insight/Judgement] : insight and judgment were intact [Obese] : obese [Right Foot Was Examined] : Right foot ~C was examined [Left Foot Was Examined] : left foot ~C was examined [None] : no ulcers in either foot were found [] : both feet [de-identified] : with murmur  [de-identified] : epigastric tenderness that reproduces the pain

## 2019-09-05 NOTE — COUNSELING
[AUDIT-C Screening administered and reviewed] : AUDIT-C Screening administered and reviewed [Hazards of at-risk alcohol use discussed] : Hazards of at-risk alcohol use discussed [Benefits of weight loss discussed] : Benefits of weight loss discussed [Potential consequences of obesity discussed] : Potential consequences of obesity discussed [Encouraged to maintain food diary] : Encouraged to maintain food diary [____ min/wk Activity] : [unfilled] min/wk activity [Encouraged to increase physical activity] : Encouraged to increase physical activity [FreeTextEntry2] : ADA diet

## 2019-09-05 NOTE — HISTORY OF PRESENT ILLNESS
[FreeTextEntry1] : Abdomen discomfort and chest pressure.   [de-identified] : A 49-year-old female, with a past medical history as noted below, who presents to the office today for evaluation of upper abdominal discomfort which radiates to the chest and back area.\par Patient is unaware of what makes the discomfort better or worse. Does have slight nausea associated with the discomfort.  Denies shortness of breath or heart palpitations.\par Patient also states she was recently hospitalized for a pelvic infection. States she has a followup appointment with her gynecologist to set up the cervical procedure to remove the complex cyst.\par \par patient denies fever chills or night sweats.  \par

## 2019-09-05 NOTE — ASSESSMENT
[FreeTextEntry1] : A 48- year-old female who presents to the office for evaluation of chest pressure and abd discomfort

## 2019-09-05 NOTE — DATA REVIEWED
[FreeTextEntry1] : WBC 11.6  plt 406 and hgb 10.9 on 7/30/19\par 8/3/19  CEA  negativen Ca 125 (38) normal 38 or less \par \par  EXAM:  MR PELVIS WAW IC                      \par \par PROCEDURE DATE:  07/29/2019  \par \par INTERPRETATION:  History: Complex right adnexal mass on CT and ultrasound.\par \par Pelvic MR multisequence without with IV contrast.\par \par There is a complex cystic mass in the right adnexa corresponding to CT \par and ultrasound abnormality. This demonstrates multiple internal septa and \par peripheral and septal enhancement. Normal ovaries not identified. This \par likely represents a lesion of ovarian etiology. Differential diagnosis \par would include ovarian neoplasm endometrioma, or less likely chronic \par tubo-ovarian abscess. Also less likely, a pedunculated necrotic \par subserosal uterine fibroid could present a similar appearance. Compared \par to CT of 1/12/2017 this has increased in size now measures 6.2 x 4.4 cm \par previously measured 4.9 x 4.4 cm. Otherwise, the uterus is unremarkable. \par No free fluid. No suspicious adenopathy. Bladder not adequately \par distended. Sigmoid diverticula, no diverticulitis\par \par Impression: Complex cystic right adnexal mass with interval increase in \par size compared to 1/12/2017. See discussion above.\par \par \par PRIYANKA CONTEH M.D., ATTENDING RADIOLOGIST\par This document has been electronically signed. Jul 29 2019  3:13PM\par \par  EXAM:  CT ABDOMEN AND PELVIS OC                      \par \par \par \par \par \par PROCEDURE DATE:  07/25/2019  \par \par \par \par INTERPRETATION:  CLINICAL INFORMATION: Right lower quadrant pain and \par fever.\par \par PROCEDURE: \par Helical axial images were obtained from the domes of the diaphragm \par through the pubic symphysis without intravenous contrast. Oral contrast \par was administered. Coronal and sagittal reformats were also obtained.   \par \par COMPARISON: Abdominal CT and pelvic ultrasound 1/12/2017.\par \par FINDINGS: Evaluation of the abdominal/pelvic organs, viscera and \par vasculature is limited without intravenous contrast. \par \par LOWER CHEST: Subsegmental atelectasis.\par \par LIVER: Again noted, diffuse low-attenuation indicating fatty \par infiltration, with stranding near the gallbladder fossa.\par GALLBLADDER/BILE DUCTS: No intrahepatic or extrahepatic biliary \par dilatation. No radiopaque gallstone.\par PANCREAS: Unremarkable.\par SPLEEN: Unremarkable.\par \par ADRENALS: Unremarkable.\par KIDNEYS/URETERS: No hydronephrosis, hydroureter or significant \par perinephric stranding. No radiopaque urinary tract stone. \par BLADDER: Partially distended.\par REPRODUCTIVE ORGANS: Post surgical changes in the uterus. Again noted, \par hypodense lesion in the right adnexa. Unremarkable left adnexa.\par \par BOWEL: No bowel obstruction. Unremarkable appendix. Colon diverticulosis.\par PERITONEUM: No drainable fluid collection or free air.\par VESSELS: Atherosclerotic change of the abdominal aorta and its branches. \par RETROPERITONEUM: No lymphadenopathy.  \par ABDOMINAL WALL/SOFT TISSUES: Postsurgical changes along the anterior \par pelvic wall.\par BONES: Degenerative changes of the spine, pubic symphysis and sacroiliac \par joints. \par \par IMPRESSION: \par \par No appendicitis.\par \par Persistent right adnexal lesion, which can be further characterized on a \par nonemergent contrast enhanced MRI.\par \par Additional findings as described.

## 2019-09-05 NOTE — HEALTH RISK ASSESSMENT
[] : Yes [21-25] : 21-25 [Yes] : Yes [2 - 3 times a week (3 pts)] : 2 - 3  times a week (3 points) [3 or 4 (1 pt)] : 3 or 4  (1 point) [Less than monthly (1 pt)] : Less than monthly (1 point) [No] : In the past 12 months have you used drugs other than those required for medical reasons? No [No falls in past year] : Patient reported no falls in the past year [1] : 1) Little interest or pleasure doing things for several days (1) [0] : 2) Feeling down, depressed, or hopeless: Not at all (0) [No Retinopathy] : No retinopathy [YearQuit] : 2003 [Audit-CScore] : 5 [de-identified] : walk [de-identified] : regular, weight watches  [[id number]] : 1 [PapSmearDate] : 02/19 [PapSmearComments] : Dr. Peraza  [EyeExamDate] : 09/17

## 2019-09-10 ENCOUNTER — APPOINTMENT (OUTPATIENT)
Dept: PULMONOLOGY | Facility: CLINIC | Age: 49
End: 2019-09-10
Payer: COMMERCIAL

## 2019-09-10 VITALS
WEIGHT: 247 LBS | OXYGEN SATURATION: 98 % | BODY MASS INDEX: 39.7 KG/M2 | HEART RATE: 88 BPM | HEIGHT: 66 IN | DIASTOLIC BLOOD PRESSURE: 89 MMHG | SYSTOLIC BLOOD PRESSURE: 149 MMHG

## 2019-09-10 DIAGNOSIS — Z86.39 PERSONAL HISTORY OF OTHER ENDOCRINE, NUTRITIONAL AND METABOLIC DISEASE: ICD-10-CM

## 2019-09-10 DIAGNOSIS — R06.83 SNORING: ICD-10-CM

## 2019-09-10 PROCEDURE — 94729 DIFFUSING CAPACITY: CPT

## 2019-09-10 PROCEDURE — 94727 GAS DIL/WSHOT DETER LNG VOL: CPT

## 2019-09-10 PROCEDURE — 99204 OFFICE O/P NEW MOD 45 MIN: CPT | Mod: 25

## 2019-09-10 PROCEDURE — 88738 HGB QUANT TRANSCUTANEOUS: CPT

## 2019-09-10 PROCEDURE — 94060 EVALUATION OF WHEEZING: CPT

## 2019-09-10 NOTE — CONSULT LETTER
[Dear  ___] : Dear  [unfilled], [Consult Letter:] : I had the pleasure of evaluating your patient, [unfilled]. [Please see my note below.] : Please see my note below. [Consult Closing:] : Thank you very much for allowing me to participate in the care of this patient.  If you have any questions, please do not hesitate to contact me. [Sincerely,] : Sincerely, [FreeTextEntry3] : Carmen De Dios D.O.\par [facility]\par \par

## 2019-09-10 NOTE — PROCEDURE
[FreeTextEntry1] : HST ordered\par \par PFTS- normal \par \par \par xray from july 2019 reviewed- no  pathology

## 2019-09-10 NOTE — PHYSICAL EXAM
[General Appearance - Well Developed] : well developed [General Appearance - Well Nourished] : well nourished [Normal Conjunctiva] : the conjunctiva exhibited no abnormalities [Low Lying Soft Palate] : low lying soft palate [III] : III [] : the neck was supple [Neck Cervical Mass (___cm)] : no neck mass was observed [Arterial Pulses Normal] : the arterial pulses were normal [Exaggerated Use Of Accessory Muscles For Inspiration] : no accessory muscle use [Auscultation Breath Sounds / Voice Sounds] : lungs were clear to auscultation bilaterally [No Focal Deficits] : no focal deficits [Oriented To Time, Place, And Person] : oriented to person, place, and time [FreeTextEntry1] : NC > 16

## 2019-09-10 NOTE — HISTORY OF PRESENT ILLNESS
[FreeTextEntry1] : ANUM VELAZQUEZ is a 49 year old female who presents to the office for pre-op clearance.\par She has seen Dr. Salinas and needs to have a surgical procedure for an ovarian cyst (tentatively scheduled for october) \par SHe is here to r/o LAURO\par \par SLEEP: \par + snoring noted. no apneas, no gasping for air. no headaches in the morning\par some dream recall. no parasomnias. + dry mouth.  does not use sleep aides.\par \par bedtime around 11-midnight, rare difficulty falling asleep. wakes up for nocturia X1, short waso. final wake up is at 6am. drinks diet soda occasionally. no coffee/ tea.\par very rare doze off in the afternoon. no change in sleep on weekend, on vacations. \par \par no coughing/no wheezing. no ER or UC visits. \par \par quit smoking 16 years ago. smoked for 15 years about 1 pack per day.\par works as billing/payroll. \par \par \par

## 2019-09-10 NOTE — ASSESSMENT
[FreeTextEntry1] : Discussed effects of untreated LAURO, including but not limited to: 1) high blood pressure, 2) heart attacks, 3) diabetes, 4) irregular heart beats, 5) strokes, 6) diabetes, 7) increased risk of death. \par \par Cautioned against driving while sleepy.\par \par sleep study ordered\par \par LAURO handout given\par \par xray ordered- although one fro mjuly 2019 is within normal, she will ask Dr. Salinas if she needs a more recent one\par \par PFTS- normal lung function\par \par f/u after HST

## 2019-09-12 LAB
ALBUMIN SERPL ELPH-MCNC: 4.2 G/DL
ALP BLD-CCNC: 104 U/L
ALT SERPL-CCNC: 23 U/L
AMYLASE/CREAT SERPL: 41 U/L
ANION GAP SERPL CALC-SCNC: 13 MMOL/L
AST SERPL-CCNC: 18 U/L
BASOPHILS # BLD AUTO: 0.03 K/UL
BASOPHILS NFR BLD AUTO: 0.3 %
BILIRUB SERPL-MCNC: 0.2 MG/DL
BUN SERPL-MCNC: 10 MG/DL
CALCIUM SERPL-MCNC: 9.5 MG/DL
CHLORIDE SERPL-SCNC: 98 MMOL/L
CO2 SERPL-SCNC: 26 MMOL/L
CREAT SERPL-MCNC: 0.72 MG/DL
EOSINOPHIL # BLD AUTO: 0.06 K/UL
EOSINOPHIL NFR BLD AUTO: 0.6 %
ESTIMATED AVERAGE GLUCOSE: 183 MG/DL
GLUCOSE SERPL-MCNC: 199 MG/DL
HBA1C MFR BLD HPLC: 8 %
HCT VFR BLD CALC: 41.8 %
HGB BLD-MCNC: 12.2 G/DL
IMM GRANULOCYTES NFR BLD AUTO: 0.4 %
LPL SERPL-CCNC: 24 U/L
LYMPHOCYTES # BLD AUTO: 2.67 K/UL
LYMPHOCYTES NFR BLD AUTO: 25.2 %
MAN DIFF?: NORMAL
MCHC RBC-ENTMCNC: 23.7 PG
MCHC RBC-ENTMCNC: 29.2 GM/DL
MCV RBC AUTO: 81.3 FL
MONOCYTES # BLD AUTO: 0.53 K/UL
MONOCYTES NFR BLD AUTO: 5 %
NEUTROPHILS # BLD AUTO: 7.28 K/UL
NEUTROPHILS NFR BLD AUTO: 68.5 %
PLATELET # BLD AUTO: 443 K/UL
POTASSIUM SERPL-SCNC: 4.1 MMOL/L
PROT SERPL-MCNC: 7.4 G/DL
RBC # BLD: 5.14 M/UL
RBC # FLD: 19.7 %
SODIUM SERPL-SCNC: 137 MMOL/L
TSH SERPL-ACNC: 2.9 UIU/ML
UREA BREATH TEST QL: NEGATIVE
WBC # FLD AUTO: 10.61 K/UL

## 2019-09-16 ENCOUNTER — APPOINTMENT (OUTPATIENT)
Dept: INTERNAL MEDICINE | Facility: CLINIC | Age: 49
End: 2019-09-16
Payer: COMMERCIAL

## 2019-09-17 ENCOUNTER — OTHER (OUTPATIENT)
Age: 49
End: 2019-09-17

## 2019-09-19 ENCOUNTER — OTHER (OUTPATIENT)
Age: 49
End: 2019-09-19

## 2019-09-20 ENCOUNTER — APPOINTMENT (OUTPATIENT)
Dept: INTERNAL MEDICINE | Facility: CLINIC | Age: 49
End: 2019-09-20
Payer: COMMERCIAL

## 2019-09-20 ENCOUNTER — OTHER (OUTPATIENT)
Age: 49
End: 2019-09-20

## 2019-09-20 VITALS
SYSTOLIC BLOOD PRESSURE: 148 MMHG | RESPIRATION RATE: 16 BRPM | OXYGEN SATURATION: 97 % | DIASTOLIC BLOOD PRESSURE: 76 MMHG | BODY MASS INDEX: 39.9 KG/M2 | HEART RATE: 95 BPM | TEMPERATURE: 98.6 F | WEIGHT: 247.19 LBS

## 2019-09-20 PROCEDURE — 99214 OFFICE O/P EST MOD 30 MIN: CPT

## 2019-09-20 RX ORDER — DULAGLUTIDE 1.5 MG/.5ML
1.5 INJECTION, SOLUTION SUBCUTANEOUS
Refills: 0 | Status: DISCONTINUED | COMMUNITY
Start: 2018-10-16 | End: 2019-09-20

## 2019-09-20 RX ORDER — GLIMEPIRIDE 1 MG/1
1 TABLET ORAL
Refills: 0 | Status: DISCONTINUED | COMMUNITY
Start: 2018-10-22 | End: 2019-09-20

## 2019-09-20 NOTE — DISCUSSION/SUMMARY
[FreeTextEntry1] : Spoke with the patient:\par \par Made her aware of WNL CMP.\par \par A1C: 8.0\par B\par WBC: 10.61\par PLT: 443\par H.Pylori: neg\par Amylase/Lipase: WNL\par \par Scheduled f/u .

## 2019-09-20 NOTE — COUNSELING
[AUDIT-C Screening administered and reviewed] : AUDIT-C Screening administered and reviewed [Hazards of at-risk alcohol use discussed] : Hazards of at-risk alcohol use discussed [Potential consequences of obesity discussed] : Potential consequences of obesity discussed [Benefits of weight loss discussed] : Benefits of weight loss discussed [Encouraged to increase physical activity] : Encouraged to increase physical activity [Encouraged to maintain food diary] : Encouraged to maintain food diary [____ min/wk Activity] : [unfilled] min/wk activity [FreeTextEntry2] : ADA diet  1800 \par Advised the importance of weight loss

## 2019-09-20 NOTE — REVIEW OF SYSTEMS
[Chest Pain] : chest pain [Palpitations] : palpitations [Nausea] : nausea [Negative] : Psychiatric [Abdominal Pain] : no abdominal pain [de-identified] : stressed

## 2019-09-20 NOTE — PLAN
[FreeTextEntry1] : Heme - leukocytosis - Rxn for a repeat cbc \par \par GI- abd discomfort - pending colonoscopy next week \par \par Endocrinology  -  Obesity   Patient was educated about the importance of diet and exercise.   We discussed  a goal of a BMI near 25.   Patient was advised different treatment modalities including prescription medication as well as surgical procedures. \par \par Diabetes- A1c 8.0 on 3 medication and intolerance to metformin.  Again advise importance of weight loss. \par ANUM was provided education about general treatment options. ANUM was advised long term complication of diabetes and its comorbidities.  Patient was advised to routine follow up appointment with podiatry and opthalmology.\par should have repeat hgb a1c every 3 months and microalbumin every 6 months.  ANUM VELAZQUEZ was advised to call me if any concerns about their diabetes. \par Discontinue Glimepiride and trulicity.   \par Start Xultophy 12 units sub Q advised side effects.  Advised to call with Accu-Chek readings 3 days after starting medication.  Advised to Start medication on Monday. \par \par GYN pelvic cyst  follow up with gyn. Called gyn for last PAP results.  \par \par immunization  follow up next month for pneumonia  and flu shot

## 2019-09-20 NOTE — PHYSICAL EXAM
[Well Nourished] : well nourished [No Acute Distress] : no acute distress [Well-Appearing] : well-appearing [Well Developed] : well developed [Normal Sclera/Conjunctiva] : normal sclera/conjunctiva [PERRL] : pupils equal round and reactive to light [EOMI] : extraocular movements intact [Normal Oropharynx] : the oropharynx was normal [Normal Outer Ear/Nose] : the outer ears and nose were normal in appearance [No JVD] : no jugular venous distention [Supple] : supple [No Lymphadenopathy] : no lymphadenopathy [Thyroid Normal, No Nodules] : the thyroid was normal and there were no nodules present [Clear to Auscultation] : lungs were clear to auscultation bilaterally [No Respiratory Distress] : no respiratory distress  [Normal Rate] : normal rate  [No Accessory Muscle Use] : no accessory muscle use [Regular Rhythm] : with a regular rhythm [No Carotid Bruits] : no carotid bruits [Normal S1, S2] : normal S1 and S2 [No Abdominal Bruit] : a ~M bruit was not heard ~T in the abdomen [No Varicosities] : no varicosities [No Extremity Clubbing/Cyanosis] : no extremity clubbing/cyanosis [Pedal Pulses Present] : the pedal pulses are present [No Edema] : there was no peripheral edema [No Palpable Aorta] : no palpable aorta [Soft] : abdomen soft [No Masses] : no abdominal mass palpated [Non-distended] : non-distended [No HSM] : no HSM [Normal Bowel Sounds] : normal bowel sounds [Obese] : obese [Normal Anterior Cervical Nodes] : no anterior cervical lymphadenopathy [Normal Posterior Cervical Nodes] : no posterior cervical lymphadenopathy [No Spinal Tenderness] : no spinal tenderness [No CVA Tenderness] : no CVA  tenderness [No Joint Swelling] : no joint swelling [Grossly Normal Strength/Tone] : grossly normal strength/tone [Normal Gait] : normal gait [No Rash] : no rash [Coordination Grossly Intact] : coordination grossly intact [No Focal Deficits] : no focal deficits [Deep Tendon Reflexes (DTR)] : deep tendon reflexes were 2+ and symmetric [Normal Affect] : the affect was normal [Right Foot Was Examined] : Right foot ~C was examined [Normal Insight/Judgement] : insight and judgment were intact [Left Foot Was Examined] : left foot ~C was examined [None] : no ulcers in either foot were found [] : both feet [de-identified] : with murmur  [de-identified] : epigastric tenderness that reproduces the pain

## 2019-09-20 NOTE — HEALTH RISK ASSESSMENT
[] : Yes [Yes] : Yes [21-25] : 21-25 [Less than monthly (1 pt)] : Less than monthly (1 point) [2 - 3 times a week (3 pts)] : 2 - 3  times a week (3 points) [3 or 4 (1 pt)] : 3 or 4  (1 point) [No] : In the past 12 months have you used drugs other than those required for medical reasons? No [No falls in past year] : Patient reported no falls in the past year [1] : 1) Little interest or pleasure doing things for several days (1) [0] : 2) Feeling down, depressed, or hopeless: Not at all (0) [YearQuit] : 2003 [de-identified] : walk [Audit-CScore] : 5 [[id number]] : 1 [de-identified] : regular, weight watches

## 2019-09-20 NOTE — ASSESSMENT
[FreeTextEntry1] : A 49- year-old female who presents to the office for evaluation of abnormal labs and a1c

## 2019-09-20 NOTE — PAST MEDICAL HISTORY
[Perimenopausal] : hx of being perimenopausal [Menarche Age ____] : age at menarche was [unfilled] [Irregular Cycle Intervals] : are  irregular [Total Preg ___] : G: [unfilled] [Live Births___] : P: [unfilled] [Full Term ___] : Full Term: [unfilled]  [Abortions ___] : Abortions: [unfilled] [AB Spont ___] : miscarriage(s): [unfilled]

## 2019-09-20 NOTE — HISTORY OF PRESENT ILLNESS
[FreeTextEntry1] : Follow up on abnormal labs and blood glucose  [de-identified] : A 49-year-old female, with a past medical history as noted below, presents to office for repeat CBC. Also to discuss elevated blood glucose levels. Patient states she cannot tolerate Metformin tried it a few times.  Also states she has a little cold over the last 2 days denies fever chills or night sweats, just  feels nasal congestion.\par \par Saw  pulmonologist did schedule for any overnight sleep study.\par Also scheduled a colonoscopy and mammogram \par \par \par Does have surgery planned for 10/19

## 2019-09-23 ENCOUNTER — APPOINTMENT (OUTPATIENT)
Dept: OBGYN | Facility: CLINIC | Age: 49
End: 2019-09-23

## 2019-09-23 LAB
CREAT SPEC-SCNC: 62 MG/DL
MICROALBUMIN 24H UR DL<=1MG/L-MCNC: <1.2 MG/DL
MICROALBUMIN/CREAT 24H UR-RTO: NORMAL MG/G

## 2019-09-25 ENCOUNTER — APPOINTMENT (OUTPATIENT)
Dept: PULMONOLOGY | Facility: CLINIC | Age: 49
End: 2019-09-25
Payer: COMMERCIAL

## 2019-09-25 PROCEDURE — 95800 SLP STDY UNATTENDED: CPT

## 2019-09-26 PROCEDURE — 95800 SLP STDY UNATTENDED: CPT

## 2019-09-30 ENCOUNTER — OUTPATIENT (OUTPATIENT)
Dept: OUTPATIENT SERVICES | Facility: HOSPITAL | Age: 49
LOS: 1 days | End: 2019-09-30

## 2019-09-30 VITALS
DIASTOLIC BLOOD PRESSURE: 92 MMHG | TEMPERATURE: 98 F | OXYGEN SATURATION: 99 % | SYSTOLIC BLOOD PRESSURE: 130 MMHG | WEIGHT: 244.93 LBS | HEART RATE: 90 BPM | HEIGHT: 65 IN | RESPIRATION RATE: 16 BRPM

## 2019-09-30 DIAGNOSIS — N83.209 UNSPECIFIED OVARIAN CYST, UNSPECIFIED SIDE: ICD-10-CM

## 2019-09-30 DIAGNOSIS — N83.201 UNSPECIFIED OVARIAN CYST, RIGHT SIDE: ICD-10-CM

## 2019-09-30 DIAGNOSIS — Z98.890 OTHER SPECIFIED POSTPROCEDURAL STATES: Chronic | ICD-10-CM

## 2019-09-30 LAB
ANION GAP SERPL CALC-SCNC: 12 MMO/L — SIGNIFICANT CHANGE UP (ref 7–14)
BLD GP AB SCN SERPL QL: NEGATIVE — SIGNIFICANT CHANGE UP
BUN SERPL-MCNC: 15 MG/DL — SIGNIFICANT CHANGE UP (ref 7–23)
CALCIUM SERPL-MCNC: 9.6 MG/DL — SIGNIFICANT CHANGE UP (ref 8.4–10.5)
CHLORIDE SERPL-SCNC: 100 MMOL/L — SIGNIFICANT CHANGE UP (ref 98–107)
CO2 SERPL-SCNC: 27 MMOL/L — SIGNIFICANT CHANGE UP (ref 22–31)
CREAT SERPL-MCNC: 0.64 MG/DL — SIGNIFICANT CHANGE UP (ref 0.5–1.3)
GLUCOSE SERPL-MCNC: 160 MG/DL — HIGH (ref 70–99)
HBA1C BLD-MCNC: 7.5 % — HIGH (ref 4–5.6)
HCG SERPL-ACNC: < 5 MIU/ML — SIGNIFICANT CHANGE UP
HCT VFR BLD CALC: 42.5 % — SIGNIFICANT CHANGE UP (ref 34.5–45)
HGB BLD-MCNC: 12.5 G/DL — SIGNIFICANT CHANGE UP (ref 11.5–15.5)
MCHC RBC-ENTMCNC: 23.3 PG — LOW (ref 27–34)
MCHC RBC-ENTMCNC: 29.4 % — LOW (ref 32–36)
MCV RBC AUTO: 79.3 FL — LOW (ref 80–100)
NRBC # FLD: 0 K/UL — SIGNIFICANT CHANGE UP (ref 0–0)
PLATELET # BLD AUTO: 439 K/UL — HIGH (ref 150–400)
PMV BLD: 8.3 FL — SIGNIFICANT CHANGE UP (ref 7–13)
POTASSIUM SERPL-MCNC: 4.5 MMOL/L — SIGNIFICANT CHANGE UP (ref 3.5–5.3)
POTASSIUM SERPL-SCNC: 4.5 MMOL/L — SIGNIFICANT CHANGE UP (ref 3.5–5.3)
RBC # BLD: 5.36 M/UL — HIGH (ref 3.8–5.2)
RBC # FLD: 18.1 % — HIGH (ref 10.3–14.5)
RH IG SCN BLD-IMP: POSITIVE — SIGNIFICANT CHANGE UP
SODIUM SERPL-SCNC: 139 MMOL/L — SIGNIFICANT CHANGE UP (ref 135–145)
WBC # BLD: 11.97 K/UL — HIGH (ref 3.8–10.5)
WBC # FLD AUTO: 11.97 K/UL — HIGH (ref 3.8–10.5)

## 2019-09-30 RX ORDER — VORTIOXETINE 5 MG/1
1 TABLET, FILM COATED ORAL
Qty: 0 | Refills: 0 | DISCHARGE

## 2019-09-30 RX ORDER — GLIMEPIRIDE 1 MG
1 TABLET ORAL
Qty: 0 | Refills: 0 | DISCHARGE

## 2019-09-30 RX ORDER — DAPAGLIFLOZIN 10 MG/1
1 TABLET, FILM COATED ORAL
Qty: 0 | Refills: 0 | DISCHARGE

## 2019-09-30 NOTE — H&P PST ADULT - NEGATIVE NEUROLOGICAL SYMPTOMS
no tremors/no loss of sensation/no transient paralysis/no paresthesias/no difficulty walking/no vertigo/no weakness/no generalized seizures

## 2019-09-30 NOTE — H&P PST ADULT - NEGATIVE ENMT SYMPTOMS
no ear pain/no hearing difficulty/no vertigo/no sinus symptoms/no tinnitus/no nasal congestion/no dysphagia

## 2019-09-30 NOTE — H&P PST ADULT - NEGATIVE CARDIOVASCULAR SYMPTOMS
no orthopnea/no paroxysmal nocturnal dyspnea/no dyspnea on exertion/no claudication/no chest pain/no palpitations

## 2019-09-30 NOTE — H&P PST ADULT - NSICDXFAMILYHX_GEN_ALL_CORE_FT
FAMILY HISTORY:  Family history of diabetes mellitus (DM), Mother  FH: HTN (hypertension), father  FH: melanoma, father

## 2019-09-30 NOTE — H&P PST ADULT - HISTORY OF PRESENT ILLNESS
50 yo female with preop dx. ovarian cyst presents to pre surgical testing.  Pt reports she has been known to have a right ovarian cyst since 2017 that was stable in size.  However, pt was admitted to [facility] for fever and RLQ abdominal pain 7/25/19-7/30/19, a series of tests were done including abdominal CT and MRI, revealing enlarged right ovarian cyst.  Pt was discharged on antibiotics and followed up with GYN.  Ps is scheduled for laparoscopic right salpingo oophorectomy, dilation and curettage, possible exploratory laparotomy, hysterectomy on 10/17/19. 48 yo female with preop dx. ovarian cyst presents to pre surgical testing.  Pt reports she has been known to have a right ovarian cyst since 2017 that was stable in size.  However, pt was admitted to [facility] for fever and RLQ abdominal pain 7/25/19-7/30/19, a series of tests were done including abdominal CT and MRI, revealing enlarged right ovarian cyst.  Pt was discharged on antibiotics and followed up with GYN.  Pt is scheduled for laparoscopic right salpingo oophorectomy, dilation and curettage, possible exploratory laparotomy, hysterectomy on 10/17/19.

## 2019-09-30 NOTE — H&P PST ADULT - NSICDXPASTMEDICALHX_GEN_ALL_CORE_FT
PAST MEDICAL HISTORY:  Anemia     Basal cell cancer removed upper back    Diabetes type 2    Gestational diabetes     Ovarian cyst

## 2019-09-30 NOTE — H&P PST ADULT - LYMPHATIC
posterior cervical L/anterior cervical L/posterior cervical R/supraclavicular L/anterior cervical R/supraclavicular R

## 2019-09-30 NOTE — H&P PST ADULT - NSICDXPASTSURGICALHX_GEN_ALL_CORE_FT
PAST SURGICAL HISTORY:  H/O umbilical hernia repair     S/P anal fissurectomy 10 yrs ago    S/P  section twice, ,

## 2019-09-30 NOTE — H&P PST ADULT - NSANTHOSAYNRD_GEN_A_CORE
No. LAURO screening performed.  STOP BANG Legend: 0-2 = LOW Risk; 3-4 = INTERMEDIATE Risk; 5-8 = HIGH Risk/tested last week, pending results, Dr. De Dios [phone]

## 2019-09-30 NOTE — H&P PST ADULT - NEGATIVE MUSCULOSKELETAL SYMPTOMS
no myalgia/no muscle cramps/no neck pain/no arm pain R/no arm pain L/no leg pain R/no joint swelling/no muscle weakness/no back pain/no leg pain L/no arthralgia/no stiffness

## 2019-10-04 ENCOUNTER — APPOINTMENT (OUTPATIENT)
Dept: INTERNAL MEDICINE | Facility: CLINIC | Age: 49
End: 2019-10-04

## 2019-10-09 NOTE — PATIENT PROFILE ADULT - PRIMARY SOURCE OF SUPPORT/COMFORT
PRINCIPAL DISCHARGE DIAGNOSIS  Diagnosis: Upper GI bleed  Assessment and Plan of Treatment: You cam ein with upper GI bleed. We did CT scan and gasro eneterologist perfromed endoscopy which found ersoive gastritis. You were give acid inhibitors. You are passing stool normally now and are able to eat well. Please followup with your rheumatologist, GI doctor, and PMD for out patient follow up.      SECONDARY DISCHARGE DIAGNOSES  Diagnosis: Aspiration pneumonia  Assessment and Plan of Treatment: You developed infection of lungs due to aspirtaion. We treated you with antibiotics. Please follow up with your PCP as out patient.    Diagnosis: Polymyositis  Assessment and Plan of Treatment: We retstarted your steriods. Please follow up with your rheumatologist Dr. Wadsworth as out patient for medication adjustment. spouse

## 2019-10-10 ENCOUNTER — APPOINTMENT (OUTPATIENT)
Dept: PULMONOLOGY | Facility: CLINIC | Age: 49
End: 2019-10-10
Payer: COMMERCIAL

## 2019-10-10 VITALS
HEIGHT: 66 IN | HEART RATE: 85 BPM | OXYGEN SATURATION: 96 % | BODY MASS INDEX: 38.41 KG/M2 | WEIGHT: 239 LBS | DIASTOLIC BLOOD PRESSURE: 84 MMHG | SYSTOLIC BLOOD PRESSURE: 132 MMHG

## 2019-10-10 PROCEDURE — 99214 OFFICE O/P EST MOD 30 MIN: CPT

## 2019-10-10 NOTE — PHYSICAL EXAM
[General Appearance - Well Developed] : well developed [General Appearance - Well Nourished] : well nourished [Low Lying Soft Palate] : low lying soft palate [III] : III [Arterial Pulses Normal] : the arterial pulses were normal [Exaggerated Use Of Accessory Muscles For Inspiration] : no accessory muscle use [Auscultation Breath Sounds / Voice Sounds] : lungs were clear to auscultation bilaterally [No Focal Deficits] : no focal deficits [FreeTextEntry1] : NC > 16

## 2019-10-10 NOTE — HISTORY OF PRESENT ILLNESS
[FreeTextEntry1] : ANUM VELAZQUEZ is a 49 year old female who presents to f/u HST results\par \par \par HST done 9/25 & 9/26\par AHI 19/hr\par min saturation 77%\par TST 12 hrs\par time spent < 90% was 3% of TST\par \par surgery for ovarian cyst on hold\par no other changes in medical condition \par \par

## 2019-10-10 NOTE — ASSESSMENT
[FreeTextEntry1] : moderate LAURO\par \par Treatment options discussed includin) using CPAP, 2) weight loss, 3) dental devices, 4) surgery 5) alleviating nasal obstruction 6) avoid alcohol, sedatives within a few hours of sleep.\par \par Narcotic pain medication increases the severity of untreated LAURO and contributes to central sleep apnea. Sleeping pills may increase the severity of untreated LAURO and their use should be reevaluated once the LAURO is treated.  The risk of perioperative anesthesia complications may be increased in the presence of LAURO.\par \par \par \par start autoCPAP\par \par f/u 4-6 weeks\par

## 2019-10-10 NOTE — PROCEDURE
[FreeTextEntry1] : HST done 9/25 & 9/26\par AHI 19/hr\par min saturation 77%\par TST 12 hrs\par time spent < 90% was 3% of TST

## 2019-10-11 PROBLEM — E11.9 TYPE 2 DIABETES MELLITUS WITHOUT COMPLICATIONS: Chronic | Status: ACTIVE | Noted: 2019-07-25

## 2019-10-11 PROBLEM — D64.9 ANEMIA, UNSPECIFIED: Chronic | Status: ACTIVE | Noted: 2019-09-30

## 2019-10-11 PROBLEM — N83.209 UNSPECIFIED OVARIAN CYST, UNSPECIFIED SIDE: Chronic | Status: ACTIVE | Noted: 2019-09-30

## 2019-10-17 ENCOUNTER — APPOINTMENT (OUTPATIENT)
Dept: GYNECOLOGIC ONCOLOGY | Facility: HOSPITAL | Age: 49
End: 2019-10-17

## 2019-10-25 ENCOUNTER — LABORATORY RESULT (OUTPATIENT)
Age: 49
End: 2019-10-25

## 2019-10-25 ENCOUNTER — APPOINTMENT (OUTPATIENT)
Dept: INTERNAL MEDICINE | Facility: CLINIC | Age: 49
End: 2019-10-25
Payer: COMMERCIAL

## 2019-10-25 VITALS
SYSTOLIC BLOOD PRESSURE: 126 MMHG | BODY MASS INDEX: 38.09 KG/M2 | RESPIRATION RATE: 16 BRPM | HEIGHT: 66 IN | OXYGEN SATURATION: 98 % | DIASTOLIC BLOOD PRESSURE: 72 MMHG | WEIGHT: 237 LBS | TEMPERATURE: 97.5 F | HEART RATE: 85 BPM

## 2019-10-25 DIAGNOSIS — Z86.010 PERSONAL HISTORY OF COLONIC POLYPS: ICD-10-CM

## 2019-10-25 LAB
BILIRUB UR QL STRIP: NORMAL
CLARITY UR: CLEAR
COLLECTION METHOD: NORMAL
GLUCOSE UR-MCNC: 500
HCG UR QL: 0.2 EU/DL
HGB UR QL STRIP.AUTO: ABNORMAL
KETONES UR-MCNC: 15
LEUKOCYTE ESTERASE UR QL STRIP: NORMAL
NITRITE UR QL STRIP: NORMAL
PH UR STRIP: 5.5
PROT UR STRIP-MCNC: ABNORMAL
SP GR UR STRIP: 1.03

## 2019-10-25 PROCEDURE — 81003 URINALYSIS AUTO W/O SCOPE: CPT | Mod: QW

## 2019-10-25 PROCEDURE — 99214 OFFICE O/P EST MOD 30 MIN: CPT | Mod: 25

## 2019-10-25 PROCEDURE — 36415 COLL VENOUS BLD VENIPUNCTURE: CPT

## 2019-10-25 NOTE — ASSESSMENT
[FreeTextEntry1] : A 49- year-old female who presents to the office for evaluation of abnormal labs and a1c as well as a follow up on colonoscopy

## 2019-10-25 NOTE — COUNSELING
[AUDIT-C Screening administered and reviewed] : AUDIT-C Screening administered and reviewed [Hazards of at-risk alcohol use discussed] : Hazards of at-risk alcohol use discussed [Potential consequences of obesity discussed] : Potential consequences of obesity discussed [Benefits of weight loss discussed] : Benefits of weight loss discussed [Encouraged to maintain food diary] : Encouraged to maintain food diary [Encouraged to increase physical activity] : Encouraged to increase physical activity [____ min/wk Activity] : [unfilled] min/wk activity [Good understanding] : Patient has a good understanding of disease, goals and obesity follow-up plan [Decrease Portions] : decrease portions [FreeTextEntry2] : ADA diet  1800 \par Advised the importance of weight loss

## 2019-10-25 NOTE — HISTORY OF PRESENT ILLNESS
[FreeTextEntry1] : Follow up on abnormal labs and blood glucose  [de-identified] : A 49-year-old female, with a past medical history as noted below, presents to office for repeat blood test.   States she feeling much better.  On a diet and losing weight.   Getting better glucose readings at home.  Denies any hypo or hyperglycemic events.  \par \par Had the colonoscopy and was negative for malignancy.  did have a colon polyp removed.  \par Was dx with LAURO waiting on the CPAP machine.  \par \par Denies fever, chills or night sweats \par \par Surgical procedure was cancelled has a follow up next week

## 2019-10-25 NOTE — PLAN
[FreeTextEntry1] : Heme - leukocytosis - Rxn for a repeat cbc \par \par GI- abd discomfort - resolved-  Colonoscopy reviewed.   Advised to follow up with GI.  \par \par Pulmonary -Moderate LAURO -  We discussed cardiovascular, metabolic and other symptomatic ramifications of untreated LAURO.  ANUM was advised the importance of evaluation, treatment, compliance and follow up appointments.  We also discussed the importance of  diet and exercise programs to control weight.  Advised the importance of sleep hygiene. Advised to avoid alcohol and other sedatives that tend to exacerbate LAURO. \par Pending CPAP therapy.  \par \par Endocrinology  -  Obesity  Pt losing weight-   Patient was educated about the importance of diet and exercise.   We discussed  a goal of a BMI near 25.   Patient was advised different treatment modalities including prescription medication as well as surgical procedures. \par \par Diabetes- Last A 1c 8.0 .  Again advise importance of weight loss. \par ANUM was provided education about general treatment options. ANUM was advised long term complication of diabetes and its comorbidities.  Patient was advised to routine follow up appointment with podiatry and opthalmology.\par should have repeat hgb a1c every 3 months and microalbumin every 6 months.  ANUM VELAZQUEZ was advised to call me if any concerns about their diabetes. \par Continue  Xultophy 12 units sub Q advised side effects.  \par Check A1c today  with lipids   goal LDL less than 70 \par \par GYN pelvic cyst  follow up with gyn. Called gyn for last PAP results.  Reviewed results fro 02/19\par \par immunization  follow up next visit  for pneumonia  and flu shot \par \par call office next week to discuss labs \par

## 2019-10-25 NOTE — HEALTH RISK ASSESSMENT
[] : Yes [21-25] : 21-25 [Yes] : Yes [2 - 3 times a week (3 pts)] : 2 - 3  times a week (3 points) [3 or 4 (1 pt)] : 3 or 4  (1 point) [Less than monthly (1 pt)] : Less than monthly (1 point) [No] : In the past 12 months have you used drugs other than those required for medical reasons? No [No falls in past year] : Patient reported no falls in the past year [1] : 1) Little interest or pleasure doing things for several days (1) [0] : 2) Feeling down, depressed, or hopeless: Not at all (0) [Patient reported mammogram was normal] : Patient reported mammogram was normal [YearQuit] : 2003 [Audit-CScore] : 5 [de-identified] : regular, weight watches  [de-identified] : walk [[id number]] : 1 [Patient reported PAP Smear was normal] : Patient reported PAP Smear was normal [MammogramDate] : 04/17 [PapSmearDate] : 02/19 [PapSmearComments] : normal and HPV neg [MammogramComments] : Bi Rad2

## 2019-10-25 NOTE — PHYSICAL EXAM
[No Acute Distress] : no acute distress [Well Nourished] : well nourished [Well Developed] : well developed [Well-Appearing] : well-appearing [Normal Sclera/Conjunctiva] : normal sclera/conjunctiva [PERRL] : pupils equal round and reactive to light [EOMI] : extraocular movements intact [Normal Outer Ear/Nose] : the outer ears and nose were normal in appearance [Normal Oropharynx] : the oropharynx was normal [No JVD] : no jugular venous distention [Supple] : supple [No Lymphadenopathy] : no lymphadenopathy [Thyroid Normal, No Nodules] : the thyroid was normal and there were no nodules present [No Respiratory Distress] : no respiratory distress  [Clear to Auscultation] : lungs were clear to auscultation bilaterally [No Accessory Muscle Use] : no accessory muscle use [Normal Rate] : normal rate  [Regular Rhythm] : with a regular rhythm [Normal S1, S2] : normal S1 and S2 [No Carotid Bruits] : no carotid bruits [No Abdominal Bruit] : a ~M bruit was not heard ~T in the abdomen [No Varicosities] : no varicosities [Pedal Pulses Present] : the pedal pulses are present [No Edema] : there was no peripheral edema [No Extremity Clubbing/Cyanosis] : no extremity clubbing/cyanosis [No Palpable Aorta] : no palpable aorta [Soft] : abdomen soft [Non-distended] : non-distended [No Masses] : no abdominal mass palpated [No HSM] : no HSM [Normal Bowel Sounds] : normal bowel sounds [Obese] : obese [Normal Posterior Cervical Nodes] : no posterior cervical lymphadenopathy [Normal Anterior Cervical Nodes] : no anterior cervical lymphadenopathy [No CVA Tenderness] : no CVA  tenderness [No Spinal Tenderness] : no spinal tenderness [No Joint Swelling] : no joint swelling [Grossly Normal Strength/Tone] : grossly normal strength/tone [No Rash] : no rash [Normal Gait] : normal gait [Coordination Grossly Intact] : coordination grossly intact [No Focal Deficits] : no focal deficits [Deep Tendon Reflexes (DTR)] : deep tendon reflexes were 2+ and symmetric [Normal Affect] : the affect was normal [Normal Insight/Judgement] : insight and judgment were intact [Right Foot Was Examined] : Right foot ~C was examined [Left Foot Was Examined] : left foot ~C was examined [None] : no ulcers in either foot were found [] : both feet [de-identified] : epigastric tenderness that reproduces the pain  [de-identified] : with murmur

## 2019-10-31 RX ORDER — SIMVASTATIN 10 MG/1
10 TABLET, FILM COATED ORAL
Refills: 0 | Status: DISCONTINUED | COMMUNITY
Start: 2018-05-29 | End: 2019-10-31

## 2019-11-01 ENCOUNTER — RX RENEWAL (OUTPATIENT)
Age: 49
End: 2019-11-01

## 2019-11-04 LAB
ALBUMIN SERPL ELPH-MCNC: 4.5 G/DL
ALP BLD-CCNC: 97 U/L
ALT SERPL-CCNC: 17 U/L
ANION GAP SERPL CALC-SCNC: 18 MMOL/L
APPEARANCE: ABNORMAL
AST SERPL-CCNC: 16 U/L
BASOPHILS # BLD AUTO: 0.02 K/UL
BASOPHILS NFR BLD AUTO: 0.2 %
BILIRUB SERPL-MCNC: 0.2 MG/DL
BILIRUBIN URINE: NEGATIVE
BLOOD URINE: NEGATIVE
BUN SERPL-MCNC: 10 MG/DL
CALCIUM SERPL-MCNC: 9.9 MG/DL
CHLORIDE SERPL-SCNC: 102 MMOL/L
CHOLEST SERPL-MCNC: 224 MG/DL
CHOLEST/HDLC SERPL: 5 RATIO
CO2 SERPL-SCNC: 23 MMOL/L
COLOR: YELLOW
CREAT SERPL-MCNC: 0.62 MG/DL
EOSINOPHIL # BLD AUTO: 0.06 K/UL
EOSINOPHIL NFR BLD AUTO: 0.6 %
ESTIMATED AVERAGE GLUCOSE: 163 MG/DL
GLUCOSE QUALITATIVE U: ABNORMAL
GLUCOSE SERPL-MCNC: 128 MG/DL
HBA1C MFR BLD HPLC: 7.3 %
HCT VFR BLD CALC: 48.2 %
HDLC SERPL-MCNC: 45 MG/DL
HGB BLD-MCNC: 13.9 G/DL
HIV1+2 AB SPEC QL IA.RAPID: NONREACTIVE
IMM GRANULOCYTES NFR BLD AUTO: 0.2 %
KETONES URINE: ABNORMAL
LDLC SERPL CALC-MCNC: 159 MG/DL
LEUKOCYTE ESTERASE URINE: NEGATIVE
LYMPHOCYTES # BLD AUTO: 2.24 K/UL
LYMPHOCYTES NFR BLD AUTO: 23.2 %
MAN DIFF?: NORMAL
MCHC RBC-ENTMCNC: 23.9 PG
MCHC RBC-ENTMCNC: 28.8 GM/DL
MCV RBC AUTO: 83 FL
MONOCYTES # BLD AUTO: 0.5 K/UL
MONOCYTES NFR BLD AUTO: 5.2 %
NEUTROPHILS # BLD AUTO: 6.8 K/UL
NEUTROPHILS NFR BLD AUTO: 70.6 %
NITRITE URINE: NEGATIVE
PH URINE: 5.5
PLATELET # BLD AUTO: 461 K/UL
POTASSIUM SERPL-SCNC: 4.7 MMOL/L
PROT SERPL-MCNC: 7.7 G/DL
PROTEIN URINE: NORMAL
RBC # BLD: 5.81 M/UL
RBC # FLD: 19.2 %
SODIUM SERPL-SCNC: 143 MMOL/L
SPECIFIC GRAVITY URINE: 1.03
TRIGL SERPL-MCNC: 101 MG/DL
UROBILINOGEN URINE: NORMAL
WBC # FLD AUTO: 9.64 K/UL

## 2019-11-06 ENCOUNTER — APPOINTMENT (OUTPATIENT)
Dept: GYNECOLOGIC ONCOLOGY | Facility: CLINIC | Age: 49
End: 2019-11-06

## 2019-11-13 ENCOUNTER — MED ADMIN CHARGE (OUTPATIENT)
Age: 49
End: 2019-11-13

## 2019-11-13 ENCOUNTER — APPOINTMENT (OUTPATIENT)
Dept: INTERNAL MEDICINE | Facility: CLINIC | Age: 49
End: 2019-11-13
Payer: COMMERCIAL

## 2019-11-13 VITALS
HEART RATE: 89 BPM | RESPIRATION RATE: 16 BRPM | WEIGHT: 233 LBS | DIASTOLIC BLOOD PRESSURE: 70 MMHG | SYSTOLIC BLOOD PRESSURE: 120 MMHG | TEMPERATURE: 98.1 F | BODY MASS INDEX: 37.45 KG/M2 | OXYGEN SATURATION: 9 % | HEIGHT: 66 IN

## 2019-11-13 PROCEDURE — 90686 IIV4 VACC NO PRSV 0.5 ML IM: CPT

## 2019-11-13 PROCEDURE — 99213 OFFICE O/P EST LOW 20 MIN: CPT | Mod: 25

## 2019-11-13 PROCEDURE — G0008: CPT

## 2019-11-13 NOTE — REVIEW OF SYSTEMS
[Negative] : Cardiovascular [Recent Change In Weight] : ~T no recent weight change [Chest Pain] : no chest pain [Palpitations] : no palpitations [Abdominal Pain] : no abdominal pain [Nausea] : no nausea [FreeTextEntry2] : weight loss

## 2019-11-13 NOTE — COUNSELING
[AUDIT-C Screening administered and reviewed] : AUDIT-C Screening administered and reviewed [Hazards of at-risk alcohol use discussed] : Hazards of at-risk alcohol use discussed [Potential consequences of obesity discussed] : Potential consequences of obesity discussed [Benefits of weight loss discussed] : Benefits of weight loss discussed [Encouraged to maintain food diary] : Encouraged to maintain food diary [Encouraged to increase physical activity] : Encouraged to increase physical activity [Decrease Portions] : decrease portions [____ min/wk Activity] : [unfilled] min/wk activity [Good understanding] : Patient has a good understanding of disease, goals and obesity follow-up plan [FreeTextEntry2] : ADA diet  1800 \par Advised the importance of weight loss

## 2019-11-13 NOTE — PAST MEDICAL HISTORY
[Perimenopausal] : hx of being perimenopausal [Menarche Age ____] : age at menarche was [unfilled] [Irregular Cycle Intervals] : are  irregular [Total Preg ___] : G: [unfilled] [Live Births___] : P: [unfilled] [Full Term ___] : Full Term: [unfilled]  [AB Spont ___] : miscarriage(s): [unfilled] [Abortions ___] : Abortions: [unfilled]

## 2019-11-13 NOTE — PLAN
[FreeTextEntry1] : Heme - leukocytosis - resolved \par \par GI- abd discomfort - resolved-  Colonoscopy reviewed.   Advised to follow up with GI.  \par Follow up with surgeon.  \par \par Pulmonary -Moderate LAURO -  We discussed cardiovascular, metabolic and other symptomatic ramifications of untreated LAURO.  ANUM was advised the importance of evaluation, treatment, compliance and follow up appointments.  We also discussed the importance of  diet and exercise programs to control weight.  Advised the importance of sleep hygiene. Advised to avoid alcohol and other sedatives that tend to exacerbate LAURO. \par Advised CPAP therapy.  \par \par Endocrinology  -  Obesity  (lost another 4 lbs)Pt losing weight-   Patient was educated about the importance of diet and exercise.   We discussed  a goal of a BMI near 25.   Patient was advised different treatment modalities including prescription medication as well as surgical procedures. \par \par Diabetes- Last A 1c 8.0 .  Again advise importance of weight loss. \par ANUM was provided education about general treatment options. ANUM was advised long term complication of diabetes and its comorbidities.  Patient was advised to routine follow up appointment with podiatry and opthalmology.\par should have repeat hgb a1c every 3 months and microalbumin every 6 months.  ANUM VELAZQUEZ was advised to call me if any concerns about their diabetes. \par Continue  Xultophy 12 units sub Q advised side effects.  \par Check A1c today  with lipids   goal LDL less than 70 \par \par GYN pelvic cyst  follow up with gyn. Called gyn for last PAP results.  Reviewed results fro 02/19\par \par Immunization Flu shot given.  Consent form filled out.  Education about the vaccination was provided. Follow up next visit  for pneumonia  \par \par

## 2019-11-13 NOTE — HEALTH RISK ASSESSMENT
[] : Yes [Yes] : Yes [21-25] : 21-25 [2 - 3 times a week (3 pts)] : 2 - 3  times a week (3 points) [3 or 4 (1 pt)] : 3 or 4  (1 point) [Less than monthly (1 pt)] : Less than monthly (1 point) [No] : In the past 12 months have you used drugs other than those required for medical reasons? No [No falls in past year] : Patient reported no falls in the past year [1] : 1) Little interest or pleasure doing things for several days (1) [0] : 2) Feeling down, depressed, or hopeless: Not at all (0) [Patient reported mammogram was normal] : Patient reported mammogram was normal [Patient reported PAP Smear was normal] : Patient reported PAP Smear was normal [YearQuit] : 2003 [Audit-CScore] : 5 [de-identified] : regular, weight watches  [de-identified] : walk [[id number]] : 1 [MammogramDate] : 04/17 [PapSmearDate] : 02/19 [MammogramComments] : Bi Rad2  [PapSmearComments] : normal and HPV neg

## 2019-11-13 NOTE — HISTORY OF PRESENT ILLNESS
[FreeTextEntry1] : Check up and flu shot  [de-identified] : A 49-year-old female, with a past medical history as noted below, presents to the office today for a checkup and a flu shot.  Patient states she had a recent repeat a sonogram of her pelvis and is waiting for a phone call from the general surgeon to see how she is going to proceed. As per the last discussion with the surgeon they're leaning  more towards having surgery.\par Patient states since her last visit she continues to lose weight. Blood sugars have been in good control. Patient denies having hypo-or hyperglycemic events.

## 2019-11-13 NOTE — PHYSICAL EXAM
[No Acute Distress] : no acute distress [Well Nourished] : well nourished [Well Developed] : well developed [Normal Sclera/Conjunctiva] : normal sclera/conjunctiva [PERRL] : pupils equal round and reactive to light [Well-Appearing] : well-appearing [EOMI] : extraocular movements intact [Normal Outer Ear/Nose] : the outer ears and nose were normal in appearance [Normal Oropharynx] : the oropharynx was normal [No JVD] : no jugular venous distention [No Lymphadenopathy] : no lymphadenopathy [Supple] : supple [No Respiratory Distress] : no respiratory distress  [Thyroid Normal, No Nodules] : the thyroid was normal and there were no nodules present [Clear to Auscultation] : lungs were clear to auscultation bilaterally [Normal Rate] : normal rate  [No Accessory Muscle Use] : no accessory muscle use [Normal S1, S2] : normal S1 and S2 [Regular Rhythm] : with a regular rhythm [No Abdominal Bruit] : a ~M bruit was not heard ~T in the abdomen [No Carotid Bruits] : no carotid bruits [No Varicosities] : no varicosities [Pedal Pulses Present] : the pedal pulses are present [No Edema] : there was no peripheral edema [No Palpable Aorta] : no palpable aorta [No Extremity Clubbing/Cyanosis] : no extremity clubbing/cyanosis [Soft] : abdomen soft [No HSM] : no HSM [No Masses] : no abdominal mass palpated [Non-distended] : non-distended [Obese] : obese [Normal Bowel Sounds] : normal bowel sounds [Normal Posterior Cervical Nodes] : no posterior cervical lymphadenopathy [Normal Anterior Cervical Nodes] : no anterior cervical lymphadenopathy [No Spinal Tenderness] : no spinal tenderness [No CVA Tenderness] : no CVA  tenderness [No Joint Swelling] : no joint swelling [No Rash] : no rash [Grossly Normal Strength/Tone] : grossly normal strength/tone [Coordination Grossly Intact] : coordination grossly intact [No Focal Deficits] : no focal deficits [Normal Gait] : normal gait [Deep Tendon Reflexes (DTR)] : deep tendon reflexes were 2+ and symmetric [Normal Affect] : the affect was normal [Normal Insight/Judgement] : insight and judgment were intact [Left Foot Was Examined] : left foot ~C was examined [Right Foot Was Examined] : Right foot ~C was examined [None] : no ulcers in either foot were found [] : with full ROM [Alert and Oriented x3] : oriented to person, place, and time [de-identified] : with murmur  [de-identified] : epigastric tenderness that reproduces the pain

## 2019-11-26 ENCOUNTER — OUTPATIENT (OUTPATIENT)
Dept: OUTPATIENT SERVICES | Facility: HOSPITAL | Age: 49
LOS: 1 days | End: 2019-11-26
Payer: COMMERCIAL

## 2019-11-26 ENCOUNTER — APPOINTMENT (OUTPATIENT)
Dept: RADIOLOGY | Facility: HOSPITAL | Age: 49
End: 2019-11-26
Payer: COMMERCIAL

## 2019-11-26 DIAGNOSIS — M75.41 IMPINGEMENT SYNDROME OF RIGHT SHOULDER: ICD-10-CM

## 2019-11-26 DIAGNOSIS — M75.42 IMPINGEMENT SYNDROME OF LEFT SHOULDER: ICD-10-CM

## 2019-11-26 DIAGNOSIS — Z98.890 OTHER SPECIFIED POSTPROCEDURAL STATES: Chronic | ICD-10-CM

## 2019-11-26 PROCEDURE — 73030 X-RAY EXAM OF SHOULDER: CPT | Mod: 26,RT

## 2019-11-26 PROCEDURE — 73030 X-RAY EXAM OF SHOULDER: CPT

## 2019-12-13 ENCOUNTER — APPOINTMENT (OUTPATIENT)
Dept: OBGYN | Facility: CLINIC | Age: 49
End: 2019-12-13
Payer: COMMERCIAL

## 2019-12-13 VITALS
HEART RATE: 88 BPM | HEIGHT: 66 IN | WEIGHT: 223 LBS | DIASTOLIC BLOOD PRESSURE: 80 MMHG | OXYGEN SATURATION: 99 % | SYSTOLIC BLOOD PRESSURE: 130 MMHG | BODY MASS INDEX: 35.84 KG/M2

## 2019-12-13 LAB
HCG UR QL: NEGATIVE
QUALITY CONTROL: YES

## 2019-12-13 PROCEDURE — 99214 OFFICE O/P EST MOD 30 MIN: CPT

## 2019-12-13 NOTE — PHYSICAL EXAM
[Awake] : awake [Alert] : alert [Acute Distress] : no acute distress [LAD] : no lymphadenopathy [Thyroid Nodule] : no thyroid nodule [Goiter] : no goiter [Soft] : soft [Tender] : non tender [Distended] : not distended [H/Smegaly] : no hepatosplenomegaly [Oriented x3] : oriented to person, place, and time [Depressed Mood] : not depressed [Flat Affect] : affect not flat [Normal] : cervix [Labia Majora] : labia major [No Bleeding] : there was no active vaginal bleeding [FreeTextEntry7] : unable to assess due to body habitus

## 2019-12-13 NOTE — CHIEF COMPLAINT
[Follow Up] : follow up GYN visit [FreeTextEntry1] : adnxal mass.July 2019 --sepsis --treated at [facility]. Prev mass 4.9cm enlarged 6 cm.Currently asympt. Was sched for surgery but sono showed no mass. However repat sono again demonstrates concern and rec MRI--have done in past\par 2/2019 endom bx inactive--[facility],pap neg2/2019\par LNMP 7/2019

## 2020-01-09 ENCOUNTER — RX RENEWAL (OUTPATIENT)
Age: 50
End: 2020-01-09

## 2020-03-02 ENCOUNTER — APPOINTMENT (OUTPATIENT)
Dept: INTERNAL MEDICINE | Facility: CLINIC | Age: 50
End: 2020-03-02

## 2020-04-07 ENCOUNTER — RX RENEWAL (OUTPATIENT)
Age: 50
End: 2020-04-07

## 2020-05-29 ENCOUNTER — APPOINTMENT (OUTPATIENT)
Dept: OBGYN | Facility: CLINIC | Age: 50
End: 2020-05-29
Payer: COMMERCIAL

## 2020-05-29 VITALS
SYSTOLIC BLOOD PRESSURE: 130 MMHG | RESPIRATION RATE: 16 BRPM | DIASTOLIC BLOOD PRESSURE: 70 MMHG | HEART RATE: 86 BPM | OXYGEN SATURATION: 99 % | BODY MASS INDEX: 38.57 KG/M2 | WEIGHT: 240 LBS | HEIGHT: 66 IN

## 2020-05-29 VITALS
TEMPERATURE: 98.6 F | SYSTOLIC BLOOD PRESSURE: 130 MMHG | WEIGHT: 240 LBS | DIASTOLIC BLOOD PRESSURE: 70 MMHG | HEART RATE: 86 BPM | BODY MASS INDEX: 38.74 KG/M2

## 2020-05-29 PROCEDURE — 99396 PREV VISIT EST AGE 40-64: CPT

## 2020-05-29 NOTE — CHIEF COMPLAINT
[FreeTextEntry1] : P3.pt stress eating and drinking more wine. Gained 20 lbs.menses Jan 2019,July 2019,May 2020 negative detailed exam

## 2020-05-29 NOTE — PHYSICAL EXAM
[Acute Distress] : no acute distress [Mass] : no breast mass [Nipple Discharge] : no nipple discharge [Axillary LAD] : no axillary lymphadenopathy [Tender] : non tender [FreeTextEntry7] : difficult assess due to body habitus

## 2020-06-01 ENCOUNTER — APPOINTMENT (OUTPATIENT)
Dept: OBGYN | Facility: CLINIC | Age: 50
End: 2020-06-01
Payer: COMMERCIAL

## 2020-06-01 ENCOUNTER — ASOB RESULT (OUTPATIENT)
Age: 50
End: 2020-06-01

## 2020-06-01 VITALS — HEIGHT: 66 IN | TEMPERATURE: 98 F

## 2020-06-01 LAB
C TRACH RRNA SPEC QL NAA+PROBE: NOT DETECTED
HPV HIGH+LOW RISK DNA PNL CVX: NOT DETECTED
N GONORRHOEA RRNA SPEC QL NAA+PROBE: NOT DETECTED
SOURCE TP AMPLIFICATION: NORMAL

## 2020-06-01 PROCEDURE — 76856 US EXAM PELVIC COMPLETE: CPT

## 2020-06-02 ENCOUNTER — APPOINTMENT (OUTPATIENT)
Dept: OBGYN | Facility: CLINIC | Age: 50
End: 2020-06-02
Payer: COMMERCIAL

## 2020-06-02 DIAGNOSIS — N83.201 UNSPECIFIED OVARIAN CYST, RIGHT SIDE: ICD-10-CM

## 2020-06-02 LAB — CYTOLOGY CVX/VAG DOC THIN PREP: NORMAL

## 2020-06-02 PROCEDURE — 99213 OFFICE O/P EST LOW 20 MIN: CPT | Mod: 95

## 2020-06-16 ENCOUNTER — RX RENEWAL (OUTPATIENT)
Age: 50
End: 2020-06-16

## 2020-06-21 PROBLEM — N94.89 ADNEXAL MASS: Status: RESOLVED | Noted: 2019-12-13 | Resolved: 2020-06-21

## 2020-06-21 NOTE — CHIEF COMPLAINT
[Follow Up] : follow up GYN visit [FreeTextEntry1] : menopausal disorder\par abnl sono findings with thickened EL

## 2020-06-23 ENCOUNTER — APPOINTMENT (OUTPATIENT)
Dept: OBGYN | Facility: CLINIC | Age: 50
End: 2020-06-23

## 2020-06-23 DIAGNOSIS — N94.89 OTHER SPECIFIED CONDITIONS ASSOCIATED WITH FEMALE GENITAL ORGANS AND MENSTRUAL CYCLE: ICD-10-CM

## 2020-06-25 ENCOUNTER — APPOINTMENT (OUTPATIENT)
Dept: INTERNAL MEDICINE | Facility: CLINIC | Age: 50
End: 2020-06-25
Payer: COMMERCIAL

## 2020-06-25 ENCOUNTER — APPOINTMENT (OUTPATIENT)
Dept: OBGYN | Facility: CLINIC | Age: 50
End: 2020-06-25
Payer: COMMERCIAL

## 2020-06-25 ENCOUNTER — ASOB RESULT (OUTPATIENT)
Age: 50
End: 2020-06-25

## 2020-06-25 VITALS
HEIGHT: 66 IN | HEART RATE: 94 BPM | WEIGHT: 247 LBS | OXYGEN SATURATION: 99 % | DIASTOLIC BLOOD PRESSURE: 80 MMHG | SYSTOLIC BLOOD PRESSURE: 130 MMHG | TEMPERATURE: 98.7 F | RESPIRATION RATE: 16 BRPM | BODY MASS INDEX: 39.7 KG/M2

## 2020-06-25 DIAGNOSIS — G47.33 OBSTRUCTIVE SLEEP APNEA (ADULT) (PEDIATRIC): ICD-10-CM

## 2020-06-25 DIAGNOSIS — Z12.11 ENCOUNTER FOR SCREENING FOR MALIGNANT NEOPLASM OF COLON: ICD-10-CM

## 2020-06-25 DIAGNOSIS — R07.89 OTHER CHEST PAIN: ICD-10-CM

## 2020-06-25 DIAGNOSIS — Z13.21 ENCOUNTER FOR SCREENING FOR NUTRITIONAL DISORDER: ICD-10-CM

## 2020-06-25 DIAGNOSIS — B37.3 CANDIDIASIS OF VULVA AND VAGINA: ICD-10-CM

## 2020-06-25 DIAGNOSIS — Z11.59 ENCOUNTER FOR SCREENING FOR OTHER VIRAL DISEASES: ICD-10-CM

## 2020-06-25 LAB
BILIRUB UR QL STRIP: NORMAL
CLARITY UR: CLEAR
COLLECTION METHOD: NORMAL
GLUCOSE UR-MCNC: 500
HCG UR QL: 0.2 EU/DL
HGB UR QL STRIP.AUTO: ABNORMAL
KETONES UR-MCNC: NORMAL
LEUKOCYTE ESTERASE UR QL STRIP: NORMAL
NITRITE UR QL STRIP: NORMAL
PH UR STRIP: 5
PROT UR STRIP-MCNC: NORMAL
SP GR UR STRIP: 1.01

## 2020-06-25 PROCEDURE — 99214 OFFICE O/P EST MOD 30 MIN: CPT | Mod: 25

## 2020-06-25 PROCEDURE — 36415 COLL VENOUS BLD VENIPUNCTURE: CPT

## 2020-06-25 PROCEDURE — 76856 US EXAM PELVIC COMPLETE: CPT

## 2020-06-25 PROCEDURE — 81003 URINALYSIS AUTO W/O SCOPE: CPT | Mod: QW

## 2020-06-25 RX ORDER — BLOOD SUGAR DIAGNOSTIC
STRIP MISCELLANEOUS
Qty: 90 | Refills: 0 | Status: ACTIVE | COMMUNITY
Start: 2018-10-16

## 2020-06-25 NOTE — PLAN
[FreeTextEntry1] : Heme - HX  leukocytosis - recheck CBC \par \par \par Pulmonary -Moderate LAURO -  We discussed cardiovascular, metabolic and other symptomatic ramifications of untreated LAURO.  ANUM was advised the importance of evaluation, treatment, compliance and follow up appointments.  We also discussed the importance of  diet and exercise programs to control weight.  Advised the importance of sleep hygiene. Advised to avoid alcohol and other sedatives that tend to exacerbate LAURO. \par Advised CPAP therapy.  IF can not tolerate the machine discussed dental appliance.  \par \par Endocrinology  -  Obesity  BMI  39 - recent weight gain -   Patient was educated about the importance of diet and exercise.   We discussed  a goal of a BMI near 25.   Patient was advised different treatment modalities including prescription medication as well as surgical procedures.  Advised to restart her diet and exercise regimen which was working prior to the pandemic \par \par Diabetes- Check A1c  Again advise importance of weight loss. \par ANUM was provided education about general treatment options. AUNM was advised long term complication of diabetes and its comorbidities.  Patient was advised to routine follow up appointment with podiatry and opthalmology.\par should have repeat hgb a1c every 3 months and microalbumin every 6 months.  ANUMANGELA VELAZQUEZ was advised to call me if any concerns about their diabetes. \par Continue  Xultophy 14 units sub Q advised side effects.  \par Check A1c today  with lipids   goal LDL less than 70 \par \par GYN pelvic cyst  follow up with gyn. Reviewed mammo don 1/20\par \par Immunization Flu shot given.  Consent form filled out.  Education about the vaccination was provided. Follow up next visit  for pneumonia  \par \par Patient  education  - COVID-19   Counseling and education provided to the patient.  Advised sign and symptoms of the virus.  Advised contact precautions.  Educated patient on proper hand washing and to participate in social distancing. . \par \par Patient is in full awareness of the plan and agrees to it.  All pt question was answered.  \par \par check COVID ab

## 2020-06-25 NOTE — PHYSICAL EXAM
[No Acute Distress] : no acute distress [Well Nourished] : well nourished [Well Developed] : well developed [Well-Appearing] : well-appearing [Normal Sclera/Conjunctiva] : normal sclera/conjunctiva [EOMI] : extraocular movements intact [PERRL] : pupils equal round and reactive to light [Normal Outer Ear/Nose] : the outer ears and nose were normal in appearance [Supple] : supple [No JVD] : no jugular venous distention [Thyroid Normal, No Nodules] : the thyroid was normal and there were no nodules present [No Respiratory Distress] : no respiratory distress  [No Lymphadenopathy] : no lymphadenopathy [Clear to Auscultation] : lungs were clear to auscultation bilaterally [Normal Rate] : normal rate  [No Accessory Muscle Use] : no accessory muscle use [Regular Rhythm] : with a regular rhythm [Normal S1, S2] : normal S1 and S2 [No Carotid Bruits] : no carotid bruits [No Abdominal Bruit] : a ~M bruit was not heard ~T in the abdomen [No Varicosities] : no varicosities [Pedal Pulses Present] : the pedal pulses are present [No Edema] : there was no peripheral edema [No Extremity Clubbing/Cyanosis] : no extremity clubbing/cyanosis [No Palpable Aorta] : no palpable aorta [Soft] : abdomen soft [Non-distended] : non-distended [No Masses] : no abdominal mass palpated [No HSM] : no HSM [Normal Bowel Sounds] : normal bowel sounds [Obese] : obese [Normal Posterior Cervical Nodes] : no posterior cervical lymphadenopathy [Normal Anterior Cervical Nodes] : no anterior cervical lymphadenopathy [No CVA Tenderness] : no CVA  tenderness [No Spinal Tenderness] : no spinal tenderness [No Joint Swelling] : no joint swelling [Grossly Normal Strength/Tone] : grossly normal strength/tone [No Rash] : no rash [Normal Gait] : normal gait [Coordination Grossly Intact] : coordination grossly intact [No Focal Deficits] : no focal deficits [Deep Tendon Reflexes (DTR)] : deep tendon reflexes were 2+ and symmetric [Normal Affect] : the affect was normal [Alert and Oriented x3] : oriented to person, place, and time [Normal Insight/Judgement] : insight and judgment were intact [] : both feet [Speech Grossly Normal] : speech grossly normal [Normal TMs] : both tympanic membranes were normal [Left Foot Was Examined] : left foot ~C was examined [Normal Mood] : the mood was normal [Right Foot Was Examined] : Right foot ~C was examined [None] : no ulcers in either foot were found [de-identified] : with murmur  [de-identified] : epigastric tenderness that reproduces the pain  [TWNoteComboBox3] : +2 [TWNoteComboBox4] : +2

## 2020-06-25 NOTE — COUNSELING
[AUDIT-C Screening administered and reviewed] : AUDIT-C Screening administered and reviewed [Hazards of at-risk alcohol use discussed] : Hazards of at-risk alcohol use discussed [Potential consequences of obesity discussed] : Potential consequences of obesity discussed [Encouraged to maintain food diary] : Encouraged to maintain food diary [Benefits of weight loss discussed] : Benefits of weight loss discussed [Decrease Portions] : decrease portions [Encouraged to increase physical activity] : Encouraged to increase physical activity [____ min/wk Activity] : [unfilled] min/wk activity [Good understanding] : Patient has a good understanding of disease, goals and obesity follow-up plan [FreeTextEntry2] : ADA diet  1800 \par Advised the importance of weight loss

## 2020-06-25 NOTE — ASSESSMENT
[FreeTextEntry1] : A 50- year-old female who presents to the office for follow up on DM, recent weight gain, DM and fasting labs

## 2020-06-25 NOTE — REVIEW OF SYSTEMS
[Negative] : Gastrointestinal [Sore Throat] : no sore throat [Recent Change In Weight] : ~T no recent weight change [Chest Pain] : no chest pain [Palpitations] : no palpitations [Leg Claudication] : no leg claudication [Cough] : no cough [Nausea] : no nausea [Dysuria] : no dysuria [Constipation] : no constipation [Joint Pain] : no joint pain [Hematuria] : no hematuria [Dizziness] : no dizziness [Mole Changes] : no mole changes [FreeTextEntry2] : weight gain

## 2020-06-25 NOTE — HEALTH RISK ASSESSMENT
[] : Yes [21-25] : 21-25 [Yes] : Yes [2 - 3 times a week (3 pts)] : 2 - 3  times a week (3 points) [3 or 4 (1 pt)] : 3 or 4  (1 point) [Less than monthly (1 pt)] : Less than monthly (1 point) [No] : In the past 12 months have you used drugs other than those required for medical reasons? No [No falls in past year] : Patient reported no falls in the past year [1] : 1) Little interest or pleasure doing things for several days (1) [0] : 2) Feeling down, depressed, or hopeless: Not at all (0) [Patient reported mammogram was normal] : Patient reported mammogram was normal [Patient reported PAP Smear was normal] : Patient reported PAP Smear was normal [Audit-CScore] : 5 [YearQuit] : 2003 [de-identified] : walk [[id number]] : 1 [de-identified] : regular, weight watches  [MammogramDate] : 01/20 [MammogramComments] : Bi Rad2  [PapSmearDate] : 02/19 [HIVDate] : 10/19 [PapSmearComments] : normal and HPV neg [HIVComments] : Negative

## 2020-06-25 NOTE — HISTORY OF PRESENT ILLNESS
[FreeTextEntry1] : Fasting labs and renewal of medications  [de-identified] : A 50- year- old female, with a past medical history as noted below, presents to the office today for Fasting labs and renewal of all her medications.  States since the pandemic started she has not been out of her house.  Denies feeling ill.  States she has been off her diet and exercise regimen and gained her weight back.  States her home blood glucose readings are around 140-160.  \par Denies any hypoglycemic events.  Denies seeing the ophthalmologist for a retinal EYE exam \par Patient states she is not using the CPAP machine

## 2020-07-10 LAB
25(OH)D3 SERPL-MCNC: 25.1 NG/ML
ALBUMIN SERPL ELPH-MCNC: 4.3 G/DL
ALP BLD-CCNC: 126 U/L
ALT SERPL-CCNC: 32 U/L
ANION GAP SERPL CALC-SCNC: 15 MMOL/L
AST SERPL-CCNC: 31 U/L
BACTERIA UR CULT: ABNORMAL
BASOPHILS # BLD AUTO: 0.04 K/UL
BASOPHILS NFR BLD AUTO: 0.5 %
BILIRUB SERPL-MCNC: 0.2 MG/DL
BUN SERPL-MCNC: 9 MG/DL
CALCIUM SERPL-MCNC: 9.2 MG/DL
CHLORIDE SERPL-SCNC: 98 MMOL/L
CHOLEST SERPL-MCNC: 229 MG/DL
CHOLEST/HDLC SERPL: 4.8 RATIO
CO2 SERPL-SCNC: 24 MMOL/L
CREAT SERPL-MCNC: 0.69 MG/DL
CREAT SPEC-SCNC: 55 MG/DL
EOSINOPHIL # BLD AUTO: 0.06 K/UL
EOSINOPHIL NFR BLD AUTO: 0.7 %
ESTIMATED AVERAGE GLUCOSE: 180 MG/DL
GLUCOSE SERPL-MCNC: 158 MG/DL
HBA1C MFR BLD HPLC: 7.9 %
HCT VFR BLD CALC: 50.5 %
HDLC SERPL-MCNC: 48 MG/DL
HGB BLD-MCNC: 15 G/DL
IMM GRANULOCYTES NFR BLD AUTO: 0.4 %
LDLC SERPL CALC-MCNC: 157 MG/DL
LYMPHOCYTES # BLD AUTO: 2.17 K/UL
LYMPHOCYTES NFR BLD AUTO: 25.8 %
MAN DIFF?: NORMAL
MCHC RBC-ENTMCNC: 27.3 PG
MCHC RBC-ENTMCNC: 29.7 GM/DL
MCV RBC AUTO: 91.8 FL
MICROALBUMIN 24H UR DL<=1MG/L-MCNC: <1.2 MG/DL
MICROALBUMIN/CREAT 24H UR-RTO: NORMAL MG/G
MONOCYTES # BLD AUTO: 0.48 K/UL
MONOCYTES NFR BLD AUTO: 5.7 %
NEUTROPHILS # BLD AUTO: 5.63 K/UL
NEUTROPHILS NFR BLD AUTO: 66.9 %
PLATELET # BLD AUTO: 360 K/UL
POTASSIUM SERPL-SCNC: 4.6 MMOL/L
PROT SERPL-MCNC: 7.5 G/DL
RBC # BLD: 5.5 M/UL
RBC # FLD: 15.1 %
SARS-COV-2 IGG SERPL IA-ACNC: <0.1 INDEX
SARS-COV-2 IGG SERPL QL IA: NEGATIVE
SODIUM SERPL-SCNC: 137 MMOL/L
TRIGL SERPL-MCNC: 123 MG/DL
TSH SERPL-ACNC: 3.63 UIU/ML
WBC # FLD AUTO: 8.41 K/UL

## 2020-07-16 ENCOUNTER — LABORATORY RESULT (OUTPATIENT)
Age: 50
End: 2020-07-16

## 2020-08-28 LAB — BACTERIA UR CULT: NORMAL

## 2020-09-04 ENCOUNTER — RESULT CHARGE (OUTPATIENT)
Age: 50
End: 2020-09-04

## 2020-09-04 ENCOUNTER — APPOINTMENT (OUTPATIENT)
Dept: INTERNAL MEDICINE | Facility: CLINIC | Age: 50
End: 2020-09-04
Payer: COMMERCIAL

## 2020-09-04 VITALS
TEMPERATURE: 98.4 F | HEIGHT: 66 IN | RESPIRATION RATE: 16 BRPM | OXYGEN SATURATION: 97 % | HEART RATE: 97 BPM | SYSTOLIC BLOOD PRESSURE: 130 MMHG | BODY MASS INDEX: 39.86 KG/M2 | DIASTOLIC BLOOD PRESSURE: 70 MMHG | WEIGHT: 248 LBS

## 2020-09-04 DIAGNOSIS — E66.9 OBESITY, UNSPECIFIED: ICD-10-CM

## 2020-09-04 DIAGNOSIS — R10.30 LOWER ABDOMINAL PAIN, UNSPECIFIED: ICD-10-CM

## 2020-09-04 PROCEDURE — 99214 OFFICE O/P EST MOD 30 MIN: CPT | Mod: 25

## 2020-09-04 PROCEDURE — 36415 COLL VENOUS BLD VENIPUNCTURE: CPT

## 2020-09-04 PROCEDURE — 81003 URINALYSIS AUTO W/O SCOPE: CPT | Mod: QW

## 2020-09-04 RX ORDER — NITROFURANTOIN (MONOHYDRATE/MACROCRYSTALS) 25; 75 MG/1; MG/1
100 CAPSULE ORAL
Qty: 14 | Refills: 0 | Status: DISCONTINUED | COMMUNITY
Start: 2020-07-21 | End: 2020-09-04

## 2020-09-07 PROBLEM — R10.30 LOWER ABDOMINAL PAIN: Status: ACTIVE | Noted: 2020-09-07

## 2020-09-07 NOTE — PLAN
[FreeTextEntry1] : Pulmonary\par LAURO - previously discussed CPAP and using dental appliance \par Cardiology\par hyperlipidemia - continue Rosuvastatin Calcium and Ezetimibe 10mg p.o.q.d. - continue low cholesterol/low fat diet - check FLP and LFTs\par Endocrinology\par diabetes - continue Xultophy 18-unit subcutaneous injections q.d. as directed and Farxiga 10mg p.o.q.d; continue Rosuvastatin Calcium 20mg p.o.q.d. - continue low carbohydrate/low sugar diet - check hemoglobin A1C and urine microalbumin/creatinine ratio\par obesity - continue low carbohydrate diet and CV exercise\par low vitamin D level - check vitamin D\par Musculoskeletal/Gynecology\par lower back pain radiating to lower abdomen/pelvic area - check POCT UA - revealed glucose (likely secondary to being on Farxiga) and blood; to be sent out for culture - patient to follow up with GYN next week for further evaluation \par \par check female panel and UA

## 2020-09-07 NOTE — HISTORY OF PRESENT ILLNESS
[FreeTextEntry1] : fasting blood work and general follow-up\par  [de-identified] : Patient is a 50 year old female with a past medical history as below who presents for fasting blood work and general follow-up. Patient states she is taking all medications as prescribed and denies any adverse reactions or side effects. She denies any recent episodes of hypoglycemia. Patient notes lower back pain that started 2 days ago. She states the pain became sharper and radiated to the lower abdomen yesterday. She denies dysuria or increased urinary frequency. She denies any issues with eating. Her last menstrual cycle was about 1 month ago, but she noted spotting yesterday. She will be following up with her GYN next week. Patient reports recent transabdominal US per her GYN was normal. Patient notes weight gain which she attributes to a poor diet and less CV exercise during the quarantine. She notes restarting her low-carbohydrate diet and CV exercise (biking) recently. She states finger-sticks have revealed blood-glucose levels of 200.

## 2020-09-07 NOTE — ADDENDUM
[FreeTextEntry1] : I, Aneudy Robert, acted solely as scribe for Dr. Venancio Lagos DO on this date 09/04/2020  8:50AM .\par \par All medical record entries made by the Scribe were at my, Dr. Venancio Lagos DO direction and personally dictated by me on 09/04/2020  8:50AM. I have reviewed the chart and agree that the record accurately reflects my personal performance of the history, physical exam, assessment and plan. I have also personally directed, reviewed and agreed with the chart.\par

## 2020-09-07 NOTE — REVIEW OF SYSTEMS
[Negative] : Heme/Lymph [Recent Change In Weight] : ~T recent weight change [Back Pain] : back pain [FreeTextEntry2] : weight gain [FreeTextEntry8] : spotting  [FreeTextEntry9] : lower back pain radiating to lower abdomen

## 2020-09-07 NOTE — PHYSICAL EXAM
[Well Nourished] : well nourished [No Acute Distress] : no acute distress [Well-Appearing] : well-appearing [Well Developed] : well developed [Normal Sclera/Conjunctiva] : normal sclera/conjunctiva [PERRL] : pupils equal round and reactive to light [Normal Oropharynx] : the oropharynx was normal [Normal Outer Ear/Nose] : the outer ears and nose were normal in appearance [EOMI] : extraocular movements intact [No JVD] : no jugular venous distention [No Lymphadenopathy] : no lymphadenopathy [Supple] : supple [Thyroid Normal, No Nodules] : the thyroid was normal and there were no nodules present [No Respiratory Distress] : no respiratory distress  [Clear to Auscultation] : lungs were clear to auscultation bilaterally [No Accessory Muscle Use] : no accessory muscle use [Normal Rate] : normal rate  [Regular Rhythm] : with a regular rhythm [Normal S1, S2] : normal S1 and S2 [No Carotid Bruits] : no carotid bruits [No Abdominal Bruit] : a ~M bruit was not heard ~T in the abdomen [No Murmur] : no murmur heard [No Edema] : there was no peripheral edema [Pedal Pulses Present] : the pedal pulses are present [No Varicosities] : no varicosities [No Palpable Aorta] : no palpable aorta [No Extremity Clubbing/Cyanosis] : no extremity clubbing/cyanosis [Non Tender] : non-tender [Soft] : abdomen soft [Non-distended] : non-distended [No HSM] : no HSM [No Masses] : no abdominal mass palpated [Normal Bowel Sounds] : normal bowel sounds [Normal Posterior Cervical Nodes] : no posterior cervical lymphadenopathy [No CVA Tenderness] : no CVA  tenderness [Normal Anterior Cervical Nodes] : no anterior cervical lymphadenopathy [No Joint Swelling] : no joint swelling [No Spinal Tenderness] : no spinal tenderness [Grossly Normal Strength/Tone] : grossly normal strength/tone [Coordination Grossly Intact] : coordination grossly intact [No Rash] : no rash [Normal Gait] : normal gait [No Focal Deficits] : no focal deficits [Normal Affect] : the affect was normal [Normal Insight/Judgement] : insight and judgment were intact [de-identified] : obese

## 2020-09-07 NOTE — HEALTH RISK ASSESSMENT
[21-25] : 21-25 [Yes] : Yes [2 - 3 times a week (3 pts)] : 2 - 3  times a week (3 points) [3 or 4 (1 pt)] : 3 or 4  (1 point) [No] : In the past 12 months have you used drugs other than those required for medical reasons? No [No falls in past year] : Patient reported no falls in the past year [Less than monthly (1 pt)] : Less than monthly (1 point) [1] : 1) Little interest or pleasure doing things for several days (1) [0] : 2) Feeling down, depressed, or hopeless: Not at all (0) [Patient reported mammogram was normal] : Patient reported mammogram was normal [Patient reported PAP Smear was normal] : Patient reported PAP Smear was normal [] : No [YearQuit] : 2003 [[id number]] : 1 [Audit-CScore] : 5 [MammogramDate] : 01/20 [PapSmearDate] : 02/19 [MammogramComments] : BI-RADS 2.  [PapSmearComments] : NILM, HPV negative.  [ColonoscopyComments] : Diverticulosis in sigmoid colon/descending colon; 1 3mm polyp in sigmoid colon; internal hemorrhoids.  [HIVDate] : 10/19 [HIVComments] : Negative. [ColonoscopyDate] : 10/19

## 2020-09-07 NOTE — ASSESSMENT
[FreeTextEntry1] : Patient is a 50 year old female with a past medical history as above who presents for fasting blood work and general follow-up.\par

## 2020-09-08 ENCOUNTER — APPOINTMENT (OUTPATIENT)
Dept: OBGYN | Facility: CLINIC | Age: 50
End: 2020-09-08

## 2020-09-08 RX ORDER — PEN NEEDLE, DIABETIC 32 GX 1/6"
32G X 4 MM NEEDLE, DISPOSABLE MISCELLANEOUS
Qty: 100 | Refills: 3 | Status: ACTIVE | COMMUNITY
Start: 2019-09-20 | End: 1900-01-01

## 2020-09-10 LAB
25(OH)D3 SERPL-MCNC: 32.6 NG/ML
ALBUMIN SERPL ELPH-MCNC: 4.2 G/DL
ALP BLD-CCNC: 132 U/L
ALT SERPL-CCNC: 37 U/L
ANION GAP SERPL CALC-SCNC: 14 MMOL/L
AST SERPL-CCNC: 30 U/L
BASOPHILS # BLD AUTO: 0.04 K/UL
BASOPHILS NFR BLD AUTO: 0.4 %
BILIRUB SERPL-MCNC: 0.2 MG/DL
BUN SERPL-MCNC: 10 MG/DL
CALCIUM SERPL-MCNC: 9.3 MG/DL
CHLORIDE SERPL-SCNC: 100 MMOL/L
CHOLEST SERPL-MCNC: 208 MG/DL
CHOLEST/HDLC SERPL: 4.7 RATIO
CO2 SERPL-SCNC: 24 MMOL/L
CREAT SERPL-MCNC: 0.75 MG/DL
CREAT SPEC-SCNC: 67 MG/DL
EOSINOPHIL # BLD AUTO: 0.07 K/UL
EOSINOPHIL NFR BLD AUTO: 0.7 %
ESTIMATED AVERAGE GLUCOSE: 206 MG/DL
GLUCOSE SERPL-MCNC: 208 MG/DL
HBA1C MFR BLD HPLC: 8.8 %
HCT VFR BLD CALC: 49.1 %
HDLC SERPL-MCNC: 44 MG/DL
HGB BLD-MCNC: 15 G/DL
IMM GRANULOCYTES NFR BLD AUTO: 0.3 %
LDLC SERPL CALC-MCNC: 135 MG/DL
LYMPHOCYTES # BLD AUTO: 2.39 K/UL
LYMPHOCYTES NFR BLD AUTO: 25.1 %
MAN DIFF?: NORMAL
MCHC RBC-ENTMCNC: 27 PG
MCHC RBC-ENTMCNC: 30.5 GM/DL
MCV RBC AUTO: 88.5 FL
MICROALBUMIN 24H UR DL<=1MG/L-MCNC: <1.2 MG/DL
MICROALBUMIN/CREAT 24H UR-RTO: NORMAL MG/G
MONOCYTES # BLD AUTO: 0.62 K/UL
MONOCYTES NFR BLD AUTO: 6.5 %
NEUTROPHILS # BLD AUTO: 6.36 K/UL
NEUTROPHILS NFR BLD AUTO: 67 %
PLATELET # BLD AUTO: 366 K/UL
POTASSIUM SERPL-SCNC: 4.6 MMOL/L
PROT SERPL-MCNC: 7.3 G/DL
RBC # BLD: 5.55 M/UL
RBC # FLD: 15.2 %
SODIUM SERPL-SCNC: 139 MMOL/L
TRIGL SERPL-MCNC: 146 MG/DL
TSH SERPL-ACNC: 5.47 UIU/ML
WBC # FLD AUTO: 9.51 K/UL

## 2020-09-15 ENCOUNTER — APPOINTMENT (OUTPATIENT)
Dept: INTERNAL MEDICINE | Facility: CLINIC | Age: 50
End: 2020-09-15
Payer: COMMERCIAL

## 2020-09-15 VITALS
DIASTOLIC BLOOD PRESSURE: 84 MMHG | WEIGHT: 246.19 LBS | BODY MASS INDEX: 39.57 KG/M2 | OXYGEN SATURATION: 99 % | HEIGHT: 66 IN | RESPIRATION RATE: 16 BRPM | TEMPERATURE: 98.5 F | SYSTOLIC BLOOD PRESSURE: 118 MMHG | HEART RATE: 80 BPM

## 2020-09-15 PROCEDURE — 36415 COLL VENOUS BLD VENIPUNCTURE: CPT

## 2020-09-15 PROCEDURE — 99214 OFFICE O/P EST MOD 30 MIN: CPT | Mod: 25

## 2020-09-15 NOTE — ASSESSMENT
[FreeTextEntry1] : Patient is a 50 year old female with a past medical history as above who presents for non-fasting blood work and general follow-up.

## 2020-09-15 NOTE — HISTORY OF PRESENT ILLNESS
[de-identified] : Patient is a 50 year old female with a past medical history as below who presents for non-fasting blood work and general follow-up. Blood work done on 9/5/20 revealed elevated TSH (5.47) and elevated ALPhos (132). Patient notes weight gain and fatigue, but denies constipation or dry skin. Patient states she is taking all medications as prescribed and denies any adverse reactions or side effects. She denies diffuse arthralgias or myalgias on Rosuvastatin Calcium. She denies any recent episodes of hypoglycemia. She has not started Qsymia as the medication as the prior authorization was rejected by her insurance.   [FreeTextEntry1] : non-fasting blood work and general follow-up

## 2020-09-15 NOTE — PHYSICAL EXAM
[No Acute Distress] : no acute distress [Well Nourished] : well nourished [Well Developed] : well developed [Well-Appearing] : well-appearing [Normal Sclera/Conjunctiva] : normal sclera/conjunctiva [PERRL] : pupils equal round and reactive to light [EOMI] : extraocular movements intact [Normal Outer Ear/Nose] : the outer ears and nose were normal in appearance [Normal Oropharynx] : the oropharynx was normal [No JVD] : no jugular venous distention [No Lymphadenopathy] : no lymphadenopathy [Supple] : supple [Thyroid Normal, No Nodules] : the thyroid was normal and there were no nodules present [No Respiratory Distress] : no respiratory distress  [No Accessory Muscle Use] : no accessory muscle use [Clear to Auscultation] : lungs were clear to auscultation bilaterally [Normal Rate] : normal rate  [Regular Rhythm] : with a regular rhythm [Normal S1, S2] : normal S1 and S2 [No Murmur] : no murmur heard [No Carotid Bruits] : no carotid bruits [No Abdominal Bruit] : a ~M bruit was not heard ~T in the abdomen [Pedal Pulses Present] : the pedal pulses are present [No Varicosities] : no varicosities [No Edema] : there was no peripheral edema [No Palpable Aorta] : no palpable aorta [No Extremity Clubbing/Cyanosis] : no extremity clubbing/cyanosis [Soft] : abdomen soft [Non Tender] : non-tender [Non-distended] : non-distended [No Masses] : no abdominal mass palpated [No HSM] : no HSM [Normal Bowel Sounds] : normal bowel sounds [Normal Posterior Cervical Nodes] : no posterior cervical lymphadenopathy [Normal Anterior Cervical Nodes] : no anterior cervical lymphadenopathy [No CVA Tenderness] : no CVA  tenderness [No Spinal Tenderness] : no spinal tenderness [No Joint Swelling] : no joint swelling [Grossly Normal Strength/Tone] : grossly normal strength/tone [No Rash] : no rash [Coordination Grossly Intact] : coordination grossly intact [No Focal Deficits] : no focal deficits [Normal Gait] : normal gait [Normal Affect] : the affect was normal [Normal Insight/Judgement] : insight and judgment were intact [de-identified] : obese

## 2020-09-15 NOTE — REVIEW OF SYSTEMS
[Fatigue] : fatigue [Recent Change In Weight] : ~T recent weight change [Negative] : Heme/Lymph [FreeTextEntry2] : weight gain

## 2020-09-15 NOTE — HEALTH RISK ASSESSMENT
[21-25] : 21-25 [Yes] : Yes [2 - 3 times a week (3 pts)] : 2 - 3  times a week (3 points) [3 or 4 (1 pt)] : 3 or 4  (1 point) [Less than monthly (1 pt)] : Less than monthly (1 point) [No] : In the past 12 months have you used drugs other than those required for medical reasons? No [No falls in past year] : Patient reported no falls in the past year [1] : 1) Little interest or pleasure doing things for several days (1) [0] : 2) Feeling down, depressed, or hopeless: Not at all (0) [Patient reported mammogram was normal] : Patient reported mammogram was normal [Patient reported PAP Smear was normal] : Patient reported PAP Smear was normal [YearQuit] : 2003 [] : No [Audit-CScore] : 5 [[id number]] : 1 [MammogramDate] : 01/20 [PapSmearDate] : 02/19 [PapSmearComments] : NILM, HPV negative.  [MammogramComments] : BI-RADS 2.  [ColonoscopyDate] : 10/19 [ColonoscopyComments] : Diverticulosis in sigmoid colon/descending colon; 1 3mm polyp in sigmoid colon; internal hemorrhoids.  [HIVDate] : 10/19 [HIVComments] : Negative.

## 2020-09-15 NOTE — PLAN
[FreeTextEntry1] : Endocrinology\par elevated TSH - check thyroid panel \par elevated ALP - check ALP Isoenzyme Measure - discussed abdominal US pending results of blood work \par diabetes - continue Xultophy 18-unit subcutaneous injections q.d. as directed and Farxiga 10mg p.o.q.d; continue Rosuvastatin Calcium 40mg p.o.q.d; discussed starting Saxenda injections, patient to consider and follow up; - continue low carbohydrate/low sugar diet \par obesity - discussed starting Saxenda injections, patient to consider and follow up; continue low carbohydrate diet and CV exercise\par Pulmonary\par LAURO - previously discussed CPAP and using dental appliance \par Cardiology\par hyperlipidemia - continue Rosuvastatin Calcium 40mg p.o.q.d. and Ezetimibe 10mg p.o.q.d. - continue low cholesterol/low fat diet\par \par check thyroid panel and ALP Isoenzyme Measure

## 2020-09-15 NOTE — ADDENDUM
[FreeTextEntry1] : I, Aneudy Robert, acted solely as scribe for Dr. Venancio Lagos DO on this date 09/15/2020  9:30AM .\par \par All medical record entries made by the Scribe were at my, Dr. Venancio Lagos DO direction and personally dictated by me on 09/15/2020  9:30AM. I have reviewed the chart and agree that the record accurately reflects my personal performance of the history, physical exam, assessment and plan. I have also personally directed, reviewed and agreed with the chart.\par

## 2020-09-16 ENCOUNTER — LABORATORY RESULT (OUTPATIENT)
Age: 50
End: 2020-09-16

## 2020-09-17 LAB
T3 SERPL-MCNC: 103 NG/DL
T3FREE SERPL-MCNC: 2.77 PG/ML
T3RU NFR SERPL: 1 TBI
T4 FREE SERPL-MCNC: 1 NG/DL
T4 SERPL-MCNC: 5.5 UG/DL
THYROGLOB AB SERPL-ACNC: <20 IU/ML
THYROPEROXIDASE AB SERPL IA-ACNC: 10.5 IU/ML
TSH SERPL-ACNC: 3.32 UIU/ML

## 2020-09-20 LAB
ALKPISO INTERP: NORMAL
ALP BLD-CCNC: 109 U/L
ALP BONE CFR SERPL: 38 %
ALP INTEST CFR SERPL: 14 %
ALP LIVER CFR SERPL: 48 %
ALP MACRO CFR SERPL: 0 %
ALP PLAC CFR SERPL: 0 %

## 2020-10-23 DIAGNOSIS — N94.6 DYSMENORRHEA, UNSPECIFIED: ICD-10-CM

## 2020-10-29 ENCOUNTER — APPOINTMENT (OUTPATIENT)
Dept: OBGYN | Facility: CLINIC | Age: 50
End: 2020-10-29
Payer: COMMERCIAL

## 2020-10-29 ENCOUNTER — ASOB RESULT (OUTPATIENT)
Age: 50
End: 2020-10-29

## 2020-10-29 PROCEDURE — 99072 ADDL SUPL MATRL&STAF TM PHE: CPT

## 2020-10-29 PROCEDURE — 76856 US EXAM PELVIC COMPLETE: CPT

## 2020-10-30 ENCOUNTER — NON-APPOINTMENT (OUTPATIENT)
Age: 50
End: 2020-10-30

## 2021-01-04 NOTE — PLAN
Spoke with patient in great detail. She would like to schedule surgery march 2. She will need to check on a few things and make some arrangements and call back to confirm.
[FreeTextEntry1] : cardiopulmonary  -heart palpation, atypical chest pressure -  We reviewed and discussed the EKG  in full detail.  Patient was advised the importance of participating in aerobic exercise programs improve their stamina.  ANUM was encourage to start an exercise program \par \par Endocrinology  -  Obesity   Patient was educated about the importance of diet and exercise.   We discussed  a goal of a BMI near 25.   Patient was advised different treatment modalities including prescription medication as well as surgical procedures.\par \par Diabetes- continue with current medications. Last hemoglobin A1c was 7.4 that was done a few weeks ago.  Advised patient that we need better control of her blood glucose levels.\par \par Psychiatry- anxiety.  Counseling given to the patient.  As per discussion sounds like she had postpartum depression years ago. Was placed on Zoloft for about a year and a half which helped.  Patient states her recent thyroid function test was normal.  Start Trintellix 10 mg. Return to the office in 3-4 weeks.  Discussed side effects \par  to medication\par Request copy of recent labs\par return to the office in 4 weeks.

## 2021-01-06 ENCOUNTER — MED ADMIN CHARGE (OUTPATIENT)
Age: 51
End: 2021-01-06

## 2021-01-06 ENCOUNTER — APPOINTMENT (OUTPATIENT)
Dept: INTERNAL MEDICINE | Facility: CLINIC | Age: 51
End: 2021-01-06
Payer: COMMERCIAL

## 2021-01-06 VITALS
HEART RATE: 97 BPM | BODY MASS INDEX: 38.25 KG/M2 | SYSTOLIC BLOOD PRESSURE: 130 MMHG | OXYGEN SATURATION: 99 % | RESPIRATION RATE: 16 BRPM | DIASTOLIC BLOOD PRESSURE: 70 MMHG | TEMPERATURE: 97.5 F | HEIGHT: 66 IN | WEIGHT: 238 LBS

## 2021-01-06 LAB
BILIRUB UR QL STRIP: NORMAL
CLARITY UR: CLEAR
COLLECTION METHOD: NORMAL
GLUCOSE UR-MCNC: 500
HCG UR QL: 0.2 EU/DL
HGB UR QL STRIP.AUTO: ABNORMAL
KETONES UR-MCNC: ABNORMAL
LEUKOCYTE ESTERASE UR QL STRIP: NORMAL
NITRITE UR QL STRIP: NORMAL
PH UR STRIP: 5
PROT UR STRIP-MCNC: NORMAL
SP GR UR STRIP: 1.02

## 2021-01-06 PROCEDURE — G0008: CPT

## 2021-01-06 PROCEDURE — 90686 IIV4 VACC NO PRSV 0.5 ML IM: CPT

## 2021-01-06 PROCEDURE — 81003 URINALYSIS AUTO W/O SCOPE: CPT | Mod: QW

## 2021-01-06 PROCEDURE — 99214 OFFICE O/P EST MOD 30 MIN: CPT | Mod: 25

## 2021-01-06 PROCEDURE — 99072 ADDL SUPL MATRL&STAF TM PHE: CPT

## 2021-01-06 PROCEDURE — 36415 COLL VENOUS BLD VENIPUNCTURE: CPT

## 2021-01-06 NOTE — PLAN
[FreeTextEntry1] : Endocrinology\par elevated TSH - check thyroid panel \par \par Diabetes - continue Xultophy 18-unit subcutaneous injections q.d. as directed and Farxiga 10mg p.o.q.d; continue Rosuvastatin Calcium 40mg p.o.q.d; discussed starting Saxenda injections, patient to consider and follow up; - continue low carbohydrate/low sugar diet \par ASA daily \par Advised yearly retinal eye exam\par \par Cardiology\par hyperlipidemia - continue Rosuvastatin Calcium 40 mg p.o.q.d. and Ezetimibe 10 mg p.o.q.d. - continue low cholesterol/low fat diet\par \par Immunization Flu shot given.  Consent form filled out.  Education about the vaccination was provided \par Advised Pneumonia vax-  Pt will consider it at next visit.  CDC information given \par \par Patient  education  - COVID-19   Counseling and education provided to the patient.  Advised sign and symptoms of the virus.  Advised contact precautions.  Educated patient on proper hand washing and to participate in social distancing. \par Patient is in full awareness of the plan and agrees to it.  All pt question was answered.  \par \par  \par I spent 25 Minutes with the patient, half of which we discussed finding on physical exam  and coordinated care.  As well as reviewed my plans and follow ups.

## 2021-01-06 NOTE — HEALTH RISK ASSESSMENT
[21-25] : 21-25 [Yes] : Yes [2 - 3 times a week (3 pts)] : 2 - 3  times a week (3 points) [3 or 4 (1 pt)] : 3 or 4  (1 point) [Less than monthly (1 pt)] : Less than monthly (1 point) [No] : In the past 12 months have you used drugs other than those required for medical reasons? No [No falls in past year] : Patient reported no falls in the past year [1] : 1) Little interest or pleasure doing things for several days (1) [0] : 2) Feeling down, depressed, or hopeless: Not at all (0) [Patient reported mammogram was normal] : Patient reported mammogram was normal [Patient reported PAP Smear was normal] : Patient reported PAP Smear was normal [] : No [YearQuit] : 2003 [Audit-CScore] : 5 [[id number]] : 1 [MammogramDate] : 01/20 [MammogramComments] : BI-RADS 2.  [PapSmearDate] : 02/19 [PapSmearComments] : NILM, HPV negative.  [ColonoscopyDate] : 10/19 [ColonoscopyComments] : Diverticulosis in sigmoid colon/descending colon; 1 3mm polyp in sigmoid colon; internal hemorrhoids.  [HIVDate] : 10/19 [HIVComments] : Negative.

## 2021-01-06 NOTE — REVIEW OF SYSTEMS
[Recent Change In Weight] : ~T recent weight change [Negative] : Heme/Lymph [Fatigue] : no fatigue [Nasal Discharge] : no nasal discharge [Palpitations] : no palpitations [Lower Ext Edema] : no lower extremity edema [Shortness Of Breath] : no shortness of breath [Cough] : no cough [Dyspnea on Exertion] : no dyspnea on exertion [Abdominal Pain] : no abdominal pain [Constipation] : no constipation [Diarrhea] : diarrhea [Vomiting] : no vomiting [Melena] : no melena [Dysuria] : no dysuria [Muscle Pain] : no muscle pain [Itching] : no itching [Skin Rash] : no skin rash [Headache] : no headache [Memory Loss] : no memory loss [Swollen Glands] : no swollen glands [FreeTextEntry2] : weight loss  [FreeTextEntry1] : elevated blood glucose

## 2021-01-06 NOTE — HISTORY OF PRESENT ILLNESS
[FreeTextEntry1] : Fasting labs and DM evaluation  [de-identified] : Ms. VELAZQUEZ is a 50 year  female, who present to the office for fasting labs.  states blood glucose basically averaging  about 150 in the am.  Denies having hypoglycemic episodes.  Trying to do better with diet.  States she lost about 10 lbs since her last office visit.\par \par Taking medications as directed \par \par

## 2021-01-06 NOTE — COUNSELING
[Encouraged to increase physical activity] : Encouraged to increase physical activity [Good understanding] : Patient has a good understanding of disease, goals and obesity follow-up plan [FreeTextEntry2] : ADA diet [address]

## 2021-01-06 NOTE — PHYSICAL EXAM
[No Acute Distress] : no acute distress [Well Nourished] : well nourished [Well Developed] : well developed [Well-Appearing] : well-appearing [Normal Sclera/Conjunctiva] : normal sclera/conjunctiva [PERRL] : pupils equal round and reactive to light [EOMI] : extraocular movements intact [Normal Outer Ear/Nose] : the outer ears and nose were normal in appearance [Normal Oropharynx] : the oropharynx was normal [No JVD] : no jugular venous distention [No Lymphadenopathy] : no lymphadenopathy [Supple] : supple [Thyroid Normal, No Nodules] : the thyroid was normal and there were no nodules present [No Respiratory Distress] : no respiratory distress  [No Accessory Muscle Use] : no accessory muscle use [Clear to Auscultation] : lungs were clear to auscultation bilaterally [Normal Rate] : normal rate  [Regular Rhythm] : with a regular rhythm [Normal S1, S2] : normal S1 and S2 [No Carotid Bruits] : no carotid bruits [No Abdominal Bruit] : a ~M bruit was not heard ~T in the abdomen [No Varicosities] : no varicosities [Pedal Pulses Present] : the pedal pulses are present [No Edema] : there was no peripheral edema [No Palpable Aorta] : no palpable aorta [No Extremity Clubbing/Cyanosis] : no extremity clubbing/cyanosis [Soft] : abdomen soft [Non Tender] : non-tender [Non-distended] : non-distended [No Masses] : no abdominal mass palpated [No HSM] : no HSM [Normal Bowel Sounds] : normal bowel sounds [Normal Posterior Cervical Nodes] : no posterior cervical lymphadenopathy [Normal Anterior Cervical Nodes] : no anterior cervical lymphadenopathy [No CVA Tenderness] : no CVA  tenderness [No Spinal Tenderness] : no spinal tenderness [No Joint Swelling] : no joint swelling [Grossly Normal Strength/Tone] : grossly normal strength/tone [No Rash] : no rash [Coordination Grossly Intact] : coordination grossly intact [No Focal Deficits] : no focal deficits [Normal Gait] : normal gait [Normal Affect] : the affect was normal [Normal Insight/Judgement] : insight and judgment were intact [Speech Grossly Normal] : speech grossly normal [Alert and Oriented x3] : oriented to person, place, and time [Normal Mood] : the mood was normal [Both Ears Pierced] : both ears [Tattoo - Single] : a single tattoo was observed [Deep Tendon Reflexes (DTR)] : deep tendon reflexes were 2+ and symmetric [de-identified] : w/ murmur  [de-identified] : obese

## 2021-01-06 NOTE — DATA REVIEWED
[No studies available for review at this time.] : No studies available for review at this time. [FreeTextEntry1] : r

## 2021-01-08 ENCOUNTER — RX RENEWAL (OUTPATIENT)
Age: 51
End: 2021-01-08

## 2021-01-14 ENCOUNTER — NON-APPOINTMENT (OUTPATIENT)
Age: 51
End: 2021-01-14

## 2021-01-14 DIAGNOSIS — R79.89 OTHER SPECIFIED ABNORMAL FINDINGS OF BLOOD CHEMISTRY: ICD-10-CM

## 2021-01-14 LAB
25(OH)D3 SERPL-MCNC: 23.5 NG/ML
ALBUMIN SERPL ELPH-MCNC: 4.2 G/DL
ALP BLD-CCNC: 120 U/L
ALT SERPL-CCNC: 14 U/L
ANION GAP SERPL CALC-SCNC: 12 MMOL/L
AST SERPL-CCNC: 12 U/L
BASOPHILS # BLD AUTO: 0.03 K/UL
BASOPHILS NFR BLD AUTO: 0.3 %
BILIRUB SERPL-MCNC: 0.2 MG/DL
BUN SERPL-MCNC: 13 MG/DL
CALCIUM SERPL-MCNC: 9.3 MG/DL
CHLORIDE SERPL-SCNC: 99 MMOL/L
CHOLEST SERPL-MCNC: 174 MG/DL
CO2 SERPL-SCNC: 25 MMOL/L
CREAT SERPL-MCNC: 0.76 MG/DL
EOSINOPHIL # BLD AUTO: 0.1 K/UL
EOSINOPHIL NFR BLD AUTO: 0.9 %
ESTIMATED AVERAGE GLUCOSE: 197 MG/DL
GLUCOSE SERPL-MCNC: 126 MG/DL
HBA1C MFR BLD HPLC: 8.5 %
HCT VFR BLD CALC: 40.6 %
HDLC SERPL-MCNC: 47 MG/DL
HGB BLD-MCNC: 11.5 G/DL
IMM GRANULOCYTES NFR BLD AUTO: 0.2 %
LDLC SERPL CALC-MCNC: 107 MG/DL
LYMPHOCYTES # BLD AUTO: 2.86 K/UL
LYMPHOCYTES NFR BLD AUTO: 25.3 %
MAN DIFF?: NORMAL
MCHC RBC-ENTMCNC: 22 PG
MCHC RBC-ENTMCNC: 28.3 GM/DL
MCV RBC AUTO: 77.8 FL
MONOCYTES # BLD AUTO: 0.74 K/UL
MONOCYTES NFR BLD AUTO: 6.5 %
NEUTROPHILS # BLD AUTO: 7.55 K/UL
NEUTROPHILS NFR BLD AUTO: 66.8 %
NONHDLC SERPL-MCNC: 127 MG/DL
PLATELET # BLD AUTO: 432 K/UL
POTASSIUM SERPL-SCNC: 4 MMOL/L
PROT SERPL-MCNC: 7.4 G/DL
PSA SERPL-MCNC: <0.01 NG/ML
RBC # BLD: 5.22 M/UL
RBC # FLD: 17.9 %
SODIUM SERPL-SCNC: 136 MMOL/L
T4 FREE SERPL-MCNC: 0.9 NG/DL
TRIGL SERPL-MCNC: 99 MG/DL
TSH SERPL-ACNC: 2.86 UIU/ML
WBC # FLD AUTO: 11.3 K/UL

## 2021-02-09 ENCOUNTER — NON-APPOINTMENT (OUTPATIENT)
Age: 51
End: 2021-02-09

## 2021-02-09 RX ORDER — PEN NEEDLE, DIABETIC 29 G X1/2"
31G X 5 MM NEEDLE, DISPOSABLE MISCELLANEOUS
Qty: 100 | Refills: 3 | Status: ACTIVE | COMMUNITY
Start: 2021-02-09 | End: 1900-01-01

## 2021-03-12 NOTE — H&P PST ADULT - NSANTHSNORERD_ENT_A_CORE
MD JOJO Ruby [facility] Nurse Msg Pool             Patient's HDL is adequate, goal is over 40, patient 41. Goal for LDL is less than 130, patient 123. Triglycerides slightly high.  Encouraged to watch the fat and cholesterol in his diet.  Glucose is normal, encouraged healthy diet and exercise        Yes

## 2021-04-19 ENCOUNTER — RX RENEWAL (OUTPATIENT)
Age: 51
End: 2021-04-19

## 2021-05-12 NOTE — H&P PST ADULT - CIGARETTES, NUMBER OF YRS
Bennie Reza MD  [id number] [facility] [id number]    Dear Capo,    I had the pleasure of seeing Ximena Scanlon for initial consultation in the outpatient  Cardiology Clinic on 5/12/2021.  Ximena is a 18 year old referred for evaluation of lightheadedness with exertion. She is scheduled to have a tonsillectomy next week, so it was determined she should be evaluated for her symptoms. Over the past year, Ximena describes developing a headache, lightheadedness, and nausea when going from low activity to full exertion. She needs to take a break and wait 20 minutes for symptoms to subside. These episodes have worsened in the past few months. Ximena has experienced a few historical syncopal episodes related to illness, showering, and when being startled/scared. She estimates drinking 32-60 ounces of water each day. She is otherwise a happy and healthy 18 year old girl.  She has been an active, healthy, and playful child and enjoys a normal appetite and normal growth and development.      A complete review of symptoms is negative except for as noted above.  Ximena was born at 38 weeks gestation weighing 3.345kg.   Ximena is a freshman at [employer] and lives at home with her family. She also works part time as a manager at [employer].  Her family medical history is positive for hyperlipidemia in the maternal grandfather.  Her past medical history is positive for 2 sets of myringotomy tubes.  Ximena has a current medication list which includes the following prescription(s): sulfacetamide, tretinoin, etonogestrel-ethinyl estradiol, etonogestrel-ethinyl estradiol, tretinoin, and amitriptyline.    ALLERGIES:   Allergen Reactions   • Gluten Meal   (Food Or Med) Other (See Comments)     Fever, body tremors/shakes per patient.          PHYSICAL EXAMINATION:    Visit Vitals  /68 (BP Location: RUE - Right upper extremity)   Pulse (!) 104   Resp 20   Ht 5' 5.51\" (1.664 m)   Wt 58.1 kg   LMP  04/28/2021 (Exact Date)   BMI 20.98 kg/m²     51 %ile (Z= 0.03) based on [facility] (Girls, 2-20 Years) weight-for-age data using vitals from 5/12/2021. 68 %ile (Z= 0.48) based on [facility] (Girls, 2-20 Years) Stature-for-age data based on Stature recorded on 5/12/2021.  Generally she was alert, acyanotic and in no acute distress.  Head was normocephalic and atraumatic.  Pupils were equal, round and reactive to light.  TM's (Tympanic membranes) were clear bilaterally.  Throat was clear with moist mucous membranes.  On respiratory exam, she had normal work of breathing, and her lungs were clear to auscultation bilaterally.  Her chest was symmetric.  On cardiovascular examination, she had a normal precordium, tachy rate and regular rhythm, normal S1 and S2, and a 2/6 ejection systolic murmur heard best at the left upper sternal border.  The murmur was louder when she was supine.  No gallop appreciated.  She had 2+/4+ pulses in all four extremities.  Her abdomen was soft and non tender with no evidence of hepatosplenomegaly.  She had normal bowel sounds.  Her extremities were warm and well perfused with no clubbing, cyanosis, or edema.  Her skin was clear without rashes and from a neurologic standpoint she was alert and interactive.      A 12 lead ECG today demonstrated sinus tachycardia and was otherwise normal.    DIAGNOSIS:    1. Innocent, Right ventricular outflow tract flow murmur  2. Likely appropriate sinus tachycardia    IMPRESSION/RECOMMENDATIONS: It was a pleasure meeting Ximena and her mother in the clinic today.     Ximena has an innocent RVOT flow murmur on her exam.  With her normal, physiologically splitting, second heart sound I would not recommend any further testing or intervention.  This should resolve spontaneously as she grows older.  It may sound louder during times of fever or illness.     In regards to her heart rates and her symptoms with exercise I believe this is a combination of chronic dehydration and  an imbalance in her exercise program with a lot of HIT training and very little low intensity cardio.  I suggested she start drinking 100 ounces of water each day and begin 20-30 minutes of low intensity cardio type exercises a few days per week.  If her symptoms improve no follow-up is necessary.  If her symptoms persist we can move ahead with a home event monitor to gather more information.      I would not find her any greater than standard risk for her upcoming procedure and anesthesia from a cardiovascular standpoint.  I would not recommend any cardiovascular medication, activity restriction or SBE (subacute bacterial endocarditis) prophylaxis.  Thanks very much for including me in Ximena's care.  If there are any further questions or concerns, please do not hesitate to contact me.    Sincerely,      Martin Alvarez MD       15

## 2021-05-20 ENCOUNTER — TRANSCRIPTION ENCOUNTER (OUTPATIENT)
Age: 51
End: 2021-05-20

## 2021-05-21 ENCOUNTER — APPOINTMENT (OUTPATIENT)
Dept: OBGYN | Facility: CLINIC | Age: 51
End: 2021-05-21
Payer: COMMERCIAL

## 2021-05-21 ENCOUNTER — RESULT REVIEW (OUTPATIENT)
Age: 51
End: 2021-05-21

## 2021-05-21 VITALS
BODY MASS INDEX: 38.25 KG/M2 | HEART RATE: 92 BPM | DIASTOLIC BLOOD PRESSURE: 86 MMHG | SYSTOLIC BLOOD PRESSURE: 139 MMHG | WEIGHT: 238 LBS | HEIGHT: 66 IN

## 2021-05-21 DIAGNOSIS — Z12.39 ENCOUNTER FOR OTHER SCREENING FOR MALIGNANT NEOPLASM OF BREAST: ICD-10-CM

## 2021-05-21 PROCEDURE — 99213 OFFICE O/P EST LOW 20 MIN: CPT

## 2021-05-21 PROCEDURE — 99072 ADDL SUPL MATRL&STAF TM PHE: CPT

## 2021-05-21 NOTE — PHYSICAL EXAM
[Appropriately responsive] : appropriately responsive [Alert] : alert [No Acute Distress] : no acute distress [Examination Of The Breasts] : a normal appearance [Normal] : normal [No Masses] : no breast masses were palpable

## 2021-05-22 ENCOUNTER — NON-APPOINTMENT (OUTPATIENT)
Age: 51
End: 2021-05-22

## 2021-06-03 ENCOUNTER — APPOINTMENT (OUTPATIENT)
Dept: MAMMOGRAPHY | Facility: CLINIC | Age: 51
End: 2021-06-03
Payer: COMMERCIAL

## 2021-06-03 ENCOUNTER — RESULT REVIEW (OUTPATIENT)
Age: 51
End: 2021-06-03

## 2021-06-03 ENCOUNTER — OUTPATIENT (OUTPATIENT)
Dept: OUTPATIENT SERVICES | Facility: HOSPITAL | Age: 51
LOS: 1 days | End: 2021-06-03
Payer: COMMERCIAL

## 2021-06-03 ENCOUNTER — APPOINTMENT (OUTPATIENT)
Dept: ULTRASOUND IMAGING | Facility: CLINIC | Age: 51
End: 2021-06-03
Payer: COMMERCIAL

## 2021-06-03 DIAGNOSIS — Z98.890 OTHER SPECIFIED POSTPROCEDURAL STATES: Chronic | ICD-10-CM

## 2021-06-03 DIAGNOSIS — Z00.8 ENCOUNTER FOR OTHER GENERAL EXAMINATION: ICD-10-CM

## 2021-06-03 PROCEDURE — G0279: CPT | Mod: 26

## 2021-06-03 PROCEDURE — 77066 DX MAMMO INCL CAD BI: CPT | Mod: 26

## 2021-06-03 PROCEDURE — 77066 DX MAMMO INCL CAD BI: CPT

## 2021-06-03 PROCEDURE — 76641 ULTRASOUND BREAST COMPLETE: CPT

## 2021-06-03 PROCEDURE — 76641 ULTRASOUND BREAST COMPLETE: CPT | Mod: 26,RT

## 2021-06-03 PROCEDURE — G0279: CPT

## 2021-06-04 DIAGNOSIS — N64.52 NIPPLE DISCHARGE: ICD-10-CM

## 2021-06-12 ENCOUNTER — RX RENEWAL (OUTPATIENT)
Age: 51
End: 2021-06-12

## 2021-06-16 ENCOUNTER — APPOINTMENT (OUTPATIENT)
Dept: INTERNAL MEDICINE | Facility: CLINIC | Age: 51
End: 2021-06-16
Payer: COMMERCIAL

## 2021-06-16 ENCOUNTER — LABORATORY RESULT (OUTPATIENT)
Age: 51
End: 2021-06-16

## 2021-06-16 VITALS
SYSTOLIC BLOOD PRESSURE: 138 MMHG | RESPIRATION RATE: 16 BRPM | TEMPERATURE: 97.7 F | HEIGHT: 66 IN | WEIGHT: 236 LBS | OXYGEN SATURATION: 98 % | HEART RATE: 95 BPM | DIASTOLIC BLOOD PRESSURE: 84 MMHG | BODY MASS INDEX: 37.93 KG/M2

## 2021-06-16 PROCEDURE — 99072 ADDL SUPL MATRL&STAF TM PHE: CPT

## 2021-06-16 PROCEDURE — 81003 URINALYSIS AUTO W/O SCOPE: CPT | Mod: QW

## 2021-06-16 PROCEDURE — 36415 COLL VENOUS BLD VENIPUNCTURE: CPT

## 2021-06-16 PROCEDURE — 99214 OFFICE O/P EST MOD 30 MIN: CPT | Mod: 25

## 2021-06-16 NOTE — PAST MEDICAL HISTORY
[Menstruating] : menstruating [Approximately ___] : the LMP was approximately [unfilled] [Total Preg ___] : G[unfilled] [Live Births ___] : P[unfilled]  [Abortions ___] : Abortions:[unfilled] [Living ___] : Living: [unfilled] [AB Spont ___] : miscarriages: [unfilled]

## 2021-06-17 LAB
BILIRUB UR QL STRIP: NORMAL
CLARITY UR: CLEAR
COLLECTION METHOD: NORMAL
GLUCOSE UR-MCNC: 500
HCG UR QL: 0.2 EU/DL
HGB UR QL STRIP.AUTO: ABNORMAL
KETONES UR-MCNC: NORMAL
LEUKOCYTE ESTERASE UR QL STRIP: NORMAL
NITRITE UR QL STRIP: NORMAL
PH UR STRIP: 5
PROT UR STRIP-MCNC: NORMAL
SP GR UR STRIP: 1.02

## 2021-06-17 NOTE — DATA REVIEWED
[No studies available for review at this time.] : No studies available for review at this time. [FreeTextEntry1] : reviewed prior weight \par Reviewed mammo and breast sonogram report

## 2021-06-17 NOTE — HISTORY OF PRESENT ILLNESS
[FreeTextEntry1] : Fasting labs, general check up [de-identified] : Mrs. VELAZQUEZ is a 51 year  female, who present to the office for fasting labs.  States her  blood glucose basically averaging  about 150 in the am.  Denies having hypoglycemic episodes.  Trying to do better with diet.  Taking medication on a daily basis \par Trying to lose weight \par Had a recent mammo and breast sonogram done which was normal but having hx of bloody discharge from the nipple.  Has an appointment for a MRI of the breast and seeing Dr. Ramirez \par

## 2021-06-17 NOTE — REVIEW OF SYSTEMS
[Recent Change In Weight] : ~T recent weight change [Negative] : Heme/Lymph [Fatigue] : no fatigue [Nasal Discharge] : no nasal discharge [Palpitations] : no palpitations [Lower Ext Edema] : no lower extremity edema [Shortness Of Breath] : no shortness of breath [Cough] : no cough [Dyspnea on Exertion] : no dyspnea on exertion [Abdominal Pain] : no abdominal pain [Constipation] : no constipation [Diarrhea] : diarrhea [Vomiting] : no vomiting [Melena] : no melena [Dysuria] : no dysuria [Muscle Pain] : no muscle pain [Itching] : no itching [Skin Rash] : no skin rash [Headache] : no headache [Memory Loss] : no memory loss [Swollen Glands] : no swollen glands [FreeTextEntry2] : weight loss

## 2021-06-17 NOTE — ASSESSMENT
[FreeTextEntry1] : Patient is a 51 year old female with a past medical history as above who presents for fasting labs and a general check up

## 2021-06-17 NOTE — PLAN
[FreeTextEntry1] : Endocrinology\par elevated TSH - check thyroid panel \par \par Diabetes - continue Xultophy 18-unit subcutaneous injections q.d. as directed and Farxiga 10mg p.o.q.d; continue Rosuvastatin Calcium 40mg p.o.q.d; discussed starting Saxenda injections, patient to consider and follow up; - continue low carbohydrate/low sugar diet \par ASA daily \par Advised yearly retinal eye exam.\par Check a1c today \par \par Cardiology\par hyperlipidemia - continue Rosuvastatin Calcium 40 mg p.o.q.d. and Ezetimibe 10 mg p.o.q.d. - continue low cholesterol/low fat diet. Check FLP/LFt \par \par Immunization - Advised Pneumonia vax-  Pt will consider it at next visit.  CDC information given \par Shingrix - discussed, patient to determine if vaccine is covered under medical benefits of current insurance plan and follow up for 1st dose if interested; otherwise, instructed to follow up at the pharmacy for vaccine \par \par Patient  education  - COVID-19   Counseling and education provided to the patient.  Advised sign and symptoms of the virus.  Advised contact precautions.  Educated patient on proper hand washing and to participate in social distancing. \par Patient is in full awareness of the plan and agrees to it.  All pt question was answered.  \par \par  GYN - Breast discharge -  Follow up with MRI of the breast and breast surgeon

## 2021-06-17 NOTE — PHYSICAL EXAM
[Well Nourished] : well nourished [Well Developed] : well developed [Well-Appearing] : well-appearing [Normal Sclera/Conjunctiva] : normal sclera/conjunctiva [PERRL] : pupils equal round and reactive to light [EOMI] : extraocular movements intact [Normal Outer Ear/Nose] : the outer ears and nose were normal in appearance [Normal Oropharynx] : the oropharynx was normal [No JVD] : no jugular venous distention [No Lymphadenopathy] : no lymphadenopathy [Supple] : supple [Thyroid Normal, No Nodules] : the thyroid was normal and there were no nodules present [No Respiratory Distress] : no respiratory distress  [No Accessory Muscle Use] : no accessory muscle use [Clear to Auscultation] : lungs were clear to auscultation bilaterally [Normal Rate] : normal rate  [Regular Rhythm] : with a regular rhythm [Normal S1, S2] : normal S1 and S2 [No Carotid Bruits] : no carotid bruits [No Abdominal Bruit] : a ~M bruit was not heard ~T in the abdomen [No Varicosities] : no varicosities [Pedal Pulses Present] : the pedal pulses are present [No Edema] : there was no peripheral edema [No Palpable Aorta] : no palpable aorta [No Extremity Clubbing/Cyanosis] : no extremity clubbing/cyanosis [Soft] : abdomen soft [Non Tender] : non-tender [Non-distended] : non-distended [No Masses] : no abdominal mass palpated [No HSM] : no HSM [Normal Bowel Sounds] : normal bowel sounds [Normal Posterior Cervical Nodes] : no posterior cervical lymphadenopathy [Normal Anterior Cervical Nodes] : no anterior cervical lymphadenopathy [No CVA Tenderness] : no CVA  tenderness [No Spinal Tenderness] : no spinal tenderness [No Joint Swelling] : no joint swelling [Grossly Normal Strength/Tone] : grossly normal strength/tone [No Rash] : no rash [Both Ears Pierced] : both ears [Tattoo - Single] : a single tattoo was observed [Coordination Grossly Intact] : coordination grossly intact [No Focal Deficits] : no focal deficits [Normal Gait] : normal gait [Deep Tendon Reflexes (DTR)] : deep tendon reflexes were 2+ and symmetric [Speech Grossly Normal] : speech grossly normal [Normal Affect] : the affect was normal [Alert and Oriented x3] : oriented to person, place, and time [Normal Mood] : the mood was normal [Normal Insight/Judgement] : insight and judgment were intact [de-identified] : w/ murmur  [de-identified] : obese

## 2021-06-17 NOTE — HEALTH RISK ASSESSMENT
[21-25] : 21-25 [Yes] : Yes [2 - 3 times a week (3 pts)] : 2 - 3  times a week (3 points) [3 or 4 (1 pt)] : 3 or 4  (1 point) [Less than monthly (1 pt)] : Less than monthly (1 point) [No] : In the past 12 months have you used drugs other than those required for medical reasons? No [No falls in past year] : Patient reported no falls in the past year [1] : 1) Little interest or pleasure doing things for several days (1) [0] : 2) Feeling down, depressed, or hopeless: Not at all (0) [Patient reported mammogram was normal] : Patient reported mammogram was normal [Patient reported PAP Smear was normal] : Patient reported PAP Smear was normal [] : No [YearQuit] : 2003 [Audit-CScore] : 5 [CCG2Uftzp] : 1 [MammogramDate] : 06/21 [MammogramComments] : BI-RADS 1 [PapSmearDate] : 02/19 [PapSmearComments] : NILM, HPV negative.  [ColonoscopyDate] : 10/19 [ColonoscopyComments] : Diverticulosis in sigmoid colon/descending colon; 1 3mm polyp in sigmoid colon; internal hemorrhoids.  [HIVDate] : 10/19 [HIVComments] : Negative.

## 2021-06-18 DIAGNOSIS — F41.0 PANIC DISORDER [EPISODIC PAROXYSMAL ANXIETY]: ICD-10-CM

## 2021-06-18 LAB
25(OH)D3 SERPL-MCNC: 38.3 NG/ML
ALBUMIN SERPL ELPH-MCNC: 4.5 G/DL
ALP BLD-CCNC: 116 U/L
ALT SERPL-CCNC: 21 U/L
ANION GAP SERPL CALC-SCNC: 13 MMOL/L
AST SERPL-CCNC: 15 U/L
BASOPHILS # BLD AUTO: 0.04 K/UL
BASOPHILS NFR BLD AUTO: 0.4 %
BILIRUB SERPL-MCNC: 0.2 MG/DL
BUN SERPL-MCNC: 11 MG/DL
CALCIUM SERPL-MCNC: 9.3 MG/DL
CANCER AG27-29 SERPL-ACNC: 22.1 U/ML
CHLORIDE SERPL-SCNC: 101 MMOL/L
CHOLEST SERPL-MCNC: 148 MG/DL
CO2 SERPL-SCNC: 25 MMOL/L
CREAT SERPL-MCNC: 0.61 MG/DL
CREAT SPEC-SCNC: 63 MG/DL
EOSINOPHIL # BLD AUTO: 0.1 K/UL
EOSINOPHIL NFR BLD AUTO: 0.9 %
ESTIMATED AVERAGE GLUCOSE: 183 MG/DL
GLUCOSE SERPL-MCNC: 135 MG/DL
HBA1C MFR BLD HPLC: 8 %
HCT VFR BLD CALC: 47 %
HDLC SERPL-MCNC: 51 MG/DL
HGB BLD-MCNC: 13.3 G/DL
IMM GRANULOCYTES NFR BLD AUTO: 0.4 %
LDLC SERPL CALC-MCNC: 77 MG/DL
LYMPHOCYTES # BLD AUTO: 2.63 K/UL
LYMPHOCYTES NFR BLD AUTO: 24.4 %
MAN DIFF?: NORMAL
MCHC RBC-ENTMCNC: 23 PG
MCHC RBC-ENTMCNC: 28.3 GM/DL
MCV RBC AUTO: 81.3 FL
MICROALBUMIN 24H UR DL<=1MG/L-MCNC: <1.2 MG/DL
MICROALBUMIN/CREAT 24H UR-RTO: NORMAL MG/G
MONOCYTES # BLD AUTO: 0.57 K/UL
MONOCYTES NFR BLD AUTO: 5.3 %
NEUTROPHILS # BLD AUTO: 7.38 K/UL
NEUTROPHILS NFR BLD AUTO: 68.6 %
NONHDLC SERPL-MCNC: 98 MG/DL
PLATELET # BLD AUTO: 416 K/UL
POTASSIUM SERPL-SCNC: 4.3 MMOL/L
PROT SERPL-MCNC: 7.5 G/DL
RBC # BLD: 5.78 M/UL
RBC # FLD: 20.4 %
SODIUM SERPL-SCNC: 138 MMOL/L
T4 FREE SERPL-MCNC: 1 NG/DL
TRIGL SERPL-MCNC: 103 MG/DL
TSH SERPL-ACNC: 3.97 UIU/ML
WBC # FLD AUTO: 10.76 K/UL

## 2021-06-28 ENCOUNTER — RX RENEWAL (OUTPATIENT)
Age: 51
End: 2021-06-28

## 2021-07-05 ENCOUNTER — APPOINTMENT (OUTPATIENT)
Dept: MRI IMAGING | Facility: CLINIC | Age: 51
End: 2021-07-05
Payer: COMMERCIAL

## 2021-07-05 ENCOUNTER — OUTPATIENT (OUTPATIENT)
Dept: OUTPATIENT SERVICES | Facility: HOSPITAL | Age: 51
LOS: 1 days | End: 2021-07-05
Payer: COMMERCIAL

## 2021-07-05 DIAGNOSIS — Z98.890 OTHER SPECIFIED POSTPROCEDURAL STATES: Chronic | ICD-10-CM

## 2021-07-05 DIAGNOSIS — N64.52 NIPPLE DISCHARGE: ICD-10-CM

## 2021-07-05 DIAGNOSIS — Z00.8 ENCOUNTER FOR OTHER GENERAL EXAMINATION: ICD-10-CM

## 2021-07-05 PROCEDURE — C8908: CPT

## 2021-07-05 PROCEDURE — A9585: CPT

## 2021-07-05 PROCEDURE — 77049 MRI BREAST C-+ W/CAD BI: CPT | Mod: 26

## 2021-07-05 PROCEDURE — C8937: CPT

## 2021-07-16 ENCOUNTER — OUTPATIENT (OUTPATIENT)
Dept: OUTPATIENT SERVICES | Facility: HOSPITAL | Age: 51
LOS: 1 days | End: 2021-07-16
Payer: COMMERCIAL

## 2021-07-16 ENCOUNTER — RX RENEWAL (OUTPATIENT)
Age: 51
End: 2021-07-16

## 2021-07-16 ENCOUNTER — APPOINTMENT (OUTPATIENT)
Dept: MRI IMAGING | Facility: IMAGING CENTER | Age: 51
End: 2021-07-16
Payer: COMMERCIAL

## 2021-07-16 ENCOUNTER — RESULT REVIEW (OUTPATIENT)
Age: 51
End: 2021-07-16

## 2021-07-16 DIAGNOSIS — Z98.890 OTHER SPECIFIED POSTPROCEDURAL STATES: Chronic | ICD-10-CM

## 2021-07-16 DIAGNOSIS — Z00.8 ENCOUNTER FOR OTHER GENERAL EXAMINATION: ICD-10-CM

## 2021-07-16 PROCEDURE — 19085 BX BREAST 1ST LESION MR IMAG: CPT | Mod: LT

## 2021-07-16 PROCEDURE — 19086 BX BREAST ADD LESION MR IMAG: CPT

## 2021-07-16 PROCEDURE — 77065 DX MAMMO INCL CAD UNI: CPT

## 2021-07-16 PROCEDURE — A9585: CPT

## 2021-07-16 PROCEDURE — 88305 TISSUE EXAM BY PATHOLOGIST: CPT

## 2021-07-16 PROCEDURE — 19085 BX BREAST 1ST LESION MR IMAG: CPT

## 2021-07-16 PROCEDURE — 19086 BX BREAST ADD LESION MR IMAG: CPT | Mod: LT

## 2021-07-16 PROCEDURE — 88305 TISSUE EXAM BY PATHOLOGIST: CPT | Mod: 26

## 2021-07-16 PROCEDURE — 77065 DX MAMMO INCL CAD UNI: CPT | Mod: 26,LT

## 2021-07-19 LAB — SURGICAL PATHOLOGY STUDY: SIGNIFICANT CHANGE UP

## 2021-07-20 ENCOUNTER — NON-APPOINTMENT (OUTPATIENT)
Age: 51
End: 2021-07-20

## 2021-07-26 ENCOUNTER — OUTPATIENT (OUTPATIENT)
Dept: OUTPATIENT SERVICES | Facility: HOSPITAL | Age: 51
LOS: 1 days | Discharge: HOME | End: 2021-07-26

## 2021-07-26 DIAGNOSIS — D72.829 ELEVATED WHITE BLOOD CELL COUNT, UNSPECIFIED: ICD-10-CM

## 2021-07-26 DIAGNOSIS — Z98.890 OTHER SPECIFIED POSTPROCEDURAL STATES: Chronic | ICD-10-CM

## 2021-07-27 ENCOUNTER — LABORATORY RESULT (OUTPATIENT)
Age: 51
End: 2021-07-27

## 2021-07-27 ENCOUNTER — APPOINTMENT (OUTPATIENT)
Dept: HEMATOLOGY ONCOLOGY | Facility: CLINIC | Age: 51
End: 2021-07-27
Payer: COMMERCIAL

## 2021-07-27 ENCOUNTER — RESULT REVIEW (OUTPATIENT)
Age: 51
End: 2021-07-27

## 2021-07-27 VITALS
SYSTOLIC BLOOD PRESSURE: 131 MMHG | DIASTOLIC BLOOD PRESSURE: 84 MMHG | HEART RATE: 82 BPM | HEIGHT: 66 IN | TEMPERATURE: 97.3 F | RESPIRATION RATE: 16 BRPM | WEIGHT: 239.86 LBS | BODY MASS INDEX: 38.55 KG/M2 | OXYGEN SATURATION: 100 %

## 2021-07-27 DIAGNOSIS — Z86.19 PERSONAL HISTORY OF OTHER INFECTIOUS AND PARASITIC DISEASES: ICD-10-CM

## 2021-07-27 DIAGNOSIS — Z87.891 PERSONAL HISTORY OF NICOTINE DEPENDENCE: ICD-10-CM

## 2021-07-27 DIAGNOSIS — Z98.890 OTHER SPECIFIED POSTPROCEDURAL STATES: ICD-10-CM

## 2021-07-27 LAB
BASOPHILS # BLD AUTO: 0.04 K/UL — SIGNIFICANT CHANGE UP (ref 0–0.2)
BASOPHILS NFR BLD AUTO: 0.4 % — SIGNIFICANT CHANGE UP (ref 0–2)
EOSINOPHIL # BLD AUTO: 0.07 K/UL — SIGNIFICANT CHANGE UP (ref 0–0.5)
EOSINOPHIL NFR BLD AUTO: 0.7 % — SIGNIFICANT CHANGE UP (ref 0–6)
FERRITIN SERPL-MCNC: 20 NG/ML
FOLATE SERPL-MCNC: 16.6 NG/ML
HCT VFR BLD CALC: 42.6 % — SIGNIFICANT CHANGE UP (ref 34.5–45)
HGB BLD-MCNC: 12.9 G/DL — SIGNIFICANT CHANGE UP (ref 11.5–15.5)
IMM GRANULOCYTES NFR BLD AUTO: 0.4 % — SIGNIFICANT CHANGE UP (ref 0–1.5)
IRON SATN MFR SERPL: 9 %
IRON SERPL-MCNC: 37 UG/DL
LDH SERPL-CCNC: 143 U/L
LYMPHOCYTES # BLD AUTO: 2.27 K/UL — SIGNIFICANT CHANGE UP (ref 1–3.3)
LYMPHOCYTES # BLD AUTO: 24.2 % — SIGNIFICANT CHANGE UP (ref 13–44)
MCHC RBC-ENTMCNC: 23.6 PG — LOW (ref 27–34)
MCHC RBC-ENTMCNC: 30.3 G/DL — LOW (ref 32–36)
MCV RBC AUTO: 78 FL — LOW (ref 80–100)
MONOCYTES # BLD AUTO: 0.55 K/UL — SIGNIFICANT CHANGE UP (ref 0–0.9)
MONOCYTES NFR BLD AUTO: 5.9 % — SIGNIFICANT CHANGE UP (ref 2–14)
NEUTROPHILS # BLD AUTO: 6.42 K/UL — SIGNIFICANT CHANGE UP (ref 1.8–7.4)
NEUTROPHILS NFR BLD AUTO: 68.4 % — SIGNIFICANT CHANGE UP (ref 43–77)
NRBC # BLD: 0 /100 WBCS — SIGNIFICANT CHANGE UP (ref 0–0)
PLATELET # BLD AUTO: 387 K/UL — SIGNIFICANT CHANGE UP (ref 150–400)
RBC # BLD: 5.46 M/UL — HIGH (ref 3.8–5.2)
RBC # FLD: 19.2 % — HIGH (ref 10.3–14.5)
RETICS #: 88.2 K/UL — SIGNIFICANT CHANGE UP (ref 25–125)
RETICS/RBC NFR: 1.6 % — SIGNIFICANT CHANGE UP (ref 0.5–2.5)
RHEUMATOID FACT SER QL: <10 IU/ML
TIBC SERPL-MCNC: 423 UG/DL
UIBC SERPL-MCNC: 386 UG/DL
VIT B12 SERPL-MCNC: 329 PG/ML
WBC # BLD: 9.39 K/UL — SIGNIFICANT CHANGE UP (ref 3.8–10.5)
WBC # FLD AUTO: 9.39 K/UL — SIGNIFICANT CHANGE UP (ref 3.8–10.5)

## 2021-07-27 PROCEDURE — 99072 ADDL SUPL MATRL&STAF TM PHE: CPT

## 2021-07-27 PROCEDURE — 99203 OFFICE O/P NEW LOW 30 MIN: CPT

## 2021-07-27 NOTE — ASSESSMENT
[FreeTextEntry1] : Lab work, pathology results reviewed.\par Chronic, intermittent leukocytosis/thrombocytosis dating back at least to 10/2019 upon review of old lab work available. H/O chronically intermittently elevated hematocrit noted as well. Discussed DDx. with patient. While suspect may have reactive component to elevated Hct. in this patient with h/o sleep apnea, can't r/o underlying MPD-explained that some BM disorders may evolve over time. Potential complications if MPD explained, along with  the fact that there is treatment available.\par Patient is agreeable to f/u lab work. Pending this/course, can decide if prudent to pursue further evaluation/possible BMB which was d/w patient today.\par \par Patient to f/u with breast surgeon as planned, for her breast papilloma/ADH. Discussed potentilal role for chemopreventative therapy pending final path/course.\par \par Patient was given the opportunity to ask questions, and her questions have been answered to her apparent satisfaction at this time. She is agreeable to recommended f/u.\par

## 2021-07-27 NOTE — OB HISTORY
[Definite:  ___ (Date)] : the last menstrual period was [unfilled] [Menarche Age: ____] : age at menarche was [unfilled] [Currently In Menopause] : currently in menopause [___] : Living: [unfilled]

## 2021-07-27 NOTE — CONSULT LETTER
[Dear  ___] : Dear  [unfilled], [Consult Letter:] : I had the pleasure of evaluating your patient, [unfilled]. [Please see my note below.] : Please see my note below. [Consult Closing:] : Thank you very much for allowing me to participate in the care of this patient.  If you have any questions, please do not hesitate to contact me. [Sincerely,] : Sincerely, [FreeTextEntry3] : Valentine Solomon MD

## 2021-07-27 NOTE — REASON FOR VISIT
[Initial Consultation] : an initial consultation for [Family Member] : family member [FreeTextEntry2] : leukocytosis

## 2021-07-27 NOTE — HISTORY OF PRESENT ILLNESS
[de-identified] : 7/2021-Patient presenting at the request of her PCP for leukocytosis, found on routine lab work by PCP-goes for lab work Q 3 months for Hemoglobin A1C. Also noted intermittent elevation of RBC.\par \par Seeing breast surgeon Dr Cotton for recent diagnosis of breast ADH, intraductal papilloma found on left breast biopsy.\par \par No current c/o CP, SOB, N/V/D, change in bowel/bladder habits. No fevers, recent infection, abnormal bruising/bleeding. No c/o H/A, pruritis.\par \par Got COVID vaccines (Pfizer).\par Sister Danielle present.

## 2021-07-28 LAB — EPO SERPL-MCNC: 43 MIU/ML

## 2021-07-29 ENCOUNTER — APPOINTMENT (OUTPATIENT)
Dept: COLORECTAL SURGERY | Facility: CLINIC | Age: 51
End: 2021-07-29

## 2021-07-29 LAB — ANA SER IF-ACNC: NEGATIVE

## 2021-07-30 ENCOUNTER — TRANSCRIPTION ENCOUNTER (OUTPATIENT)
Age: 51
End: 2021-07-30

## 2021-07-30 LAB — T(9;22)(ABL1,BCR)/CONTROL BLD/T: NORMAL

## 2021-08-02 LAB
ALBUMIN MFR SERPL ELPH: 51.3 %
ALBUMIN SERPL-MCNC: 3.7 G/DL
ALBUMIN/GLOB SERPL: 1.1 RATIO
ALPHA1 GLOB MFR SERPL ELPH: 4.8 %
ALPHA1 GLOB SERPL ELPH-MCNC: 0.3 G/DL
ALPHA2 GLOB MFR SERPL ELPH: 12.3 %
ALPHA2 GLOB SERPL ELPH-MCNC: 0.9 G/DL
B-GLOBULIN MFR SERPL ELPH: 14.2 %
B-GLOBULIN SERPL ELPH-MCNC: 1 G/DL
GAMMA GLOB FLD ELPH-MCNC: 1.3 G/DL
GAMMA GLOB MFR SERPL ELPH: 17.4 %
INTERPRETATION SERPL IEP-IMP: NORMAL
PROT SERPL-MCNC: 7.2 G/DL
PROT SERPL-MCNC: 7.2 G/DL

## 2021-08-03 LAB — JAK2RLX: NORMAL

## 2021-08-04 ENCOUNTER — APPOINTMENT (OUTPATIENT)
Dept: OBGYN | Facility: CLINIC | Age: 51
End: 2021-08-04
Payer: COMMERCIAL

## 2021-08-04 VITALS
HEART RATE: 82 BPM | BODY MASS INDEX: 38.25 KG/M2 | HEIGHT: 66 IN | WEIGHT: 238 LBS | SYSTOLIC BLOOD PRESSURE: 146 MMHG | TEMPERATURE: 97.3 F | DIASTOLIC BLOOD PRESSURE: 83 MMHG

## 2021-08-04 DIAGNOSIS — Z01.419 ENCOUNTER FOR GYNECOLOGICAL EXAMINATION (GENERAL) (ROUTINE) W/OUT ABNORMAL FINDINGS: ICD-10-CM

## 2021-08-04 PROCEDURE — 99396 PREV VISIT EST AGE 40-64: CPT

## 2021-08-04 RX ORDER — CHOLECALCIFEROL (VITAMIN D3) 125 MCG
125 MCG CAPSULE ORAL
Qty: 90 | Refills: 0 | Status: DISCONTINUED | COMMUNITY
Start: 2021-01-14 | End: 2021-08-04

## 2021-08-04 RX ORDER — PHENTERMINE AND TOPIRAMATE 3.75; 23 MG/1; MG/1
3.75-23 CAPSULE, EXTENDED RELEASE ORAL
Qty: 30 | Refills: 0 | Status: DISCONTINUED | COMMUNITY
Start: 2020-09-13 | End: 2021-08-04

## 2021-08-12 ENCOUNTER — RX RENEWAL (OUTPATIENT)
Age: 51
End: 2021-08-12

## 2021-08-18 ENCOUNTER — APPOINTMENT (OUTPATIENT)
Dept: HEMATOLOGY ONCOLOGY | Facility: CLINIC | Age: 51
End: 2021-08-18
Payer: COMMERCIAL

## 2021-08-18 PROCEDURE — 99213 OFFICE O/P EST LOW 20 MIN: CPT | Mod: 95

## 2021-08-18 NOTE — ASSESSMENT
[FreeTextEntry1] : Lab work results reviewed with patient.\par #1) Chronic, intermittent leukocytosis/thrombocytosis dating back at least to 10/2019 upon review of old lab work available. H/O chronically intermittently elevated hematocrit noted as well. Suspect may have reactive component to elevated Hct. in this patient with h/o sleep apnea, and elevated erythropoietin-discussed  DDx. Recommend abdominal/renal imaging. Most recent CBC with normal WBC and platelet count, as well as Hct., though RBC remains elevated. No FH of hemoglobinopathy known. \par Hb electrophoresis added to next lab work, as well.\par \par #2) Suspect decreased iron sat. associated with menstrual blood loss. Patient up to date on colonoscopy. To start PO iron supplement with interval f/u lab work to be done.\par \par #3) PO vitamin B 12 for a vitamin B 12 level <400.\par \par If hematologic scenario worsens/changes, can decide if prudent to pursue further evaluation/BMB, which we are holding on at this time.\par \par Patient was given the opportunity to ask questions, and her questions have been answered to her apparent satisfaction at this time. She is agreeable to recommended f/u.\par

## 2021-08-18 NOTE — PHYSICAL EXAM
[Normal] : affect appropriate [de-identified] : non-toxic appearing [de-identified] : breathing appeared unlabored [de-identified] : coloration appeared normal

## 2021-08-18 NOTE — HISTORY OF PRESENT ILLNESS
[de-identified] : 7/2021-Patient presented at the request of her PCP for leukocytosis, found on routine lab work by PCP-goes for lab work Q 3 months for Hemoglobin A1C. Also noted intermittent elevation of RBC. Erythropoietin level elevated at 43. CASE 2 mutation studies negative. BCR/ABL not detected. Has a h/o sleep apnea.\par \par \par \par Got COVID vaccines (Pfizer).\par  [de-identified] : Menses irregular and with "very heavy" flow when has it. Has endometriosis (no clear h/o uterine fibroids). Saw GYN MD last week in f/u.\par Has sleep apnea-difficulty with CPAP machine so not using it.\par No current c/o CP, SOB, N/V/D, change in bowel/bladder habits. No fevers, recent infection, abnormal bruising. No c/o H/A, pruritis.\par

## 2021-08-18 NOTE — REASON FOR VISIT
[Follow-Up Visit] : a follow-up visit for [Family Member] : family member [Home] : at home, [unfilled] , at the time of the visit. [Medical Office: ([facility])___] : at the medical office located in  [Verbal consent obtained from patient] : the patient, [unfilled] [FreeTextEntry2] : leukocytosis

## 2021-08-18 NOTE — OB HISTORY
[Menarche Age: ____] : age at menarche was [unfilled] [Currently In Menopause] : currently in menopause [___] : Living: [unfilled] [Definite:  ___ (Date)] : the last menstrual period was [unfilled]

## 2021-08-30 ENCOUNTER — RX RENEWAL (OUTPATIENT)
Age: 51
End: 2021-08-30

## 2021-09-10 ENCOUNTER — OUTPATIENT (OUTPATIENT)
Dept: OUTPATIENT SERVICES | Facility: HOSPITAL | Age: 51
LOS: 1 days | End: 2021-09-10

## 2021-09-10 DIAGNOSIS — Z98.890 OTHER SPECIFIED POSTPROCEDURAL STATES: Chronic | ICD-10-CM

## 2021-09-10 DIAGNOSIS — R74.8 ABNORMAL LEVELS OF OTHER SERUM ENZYMES: ICD-10-CM

## 2021-09-14 ENCOUNTER — APPOINTMENT (OUTPATIENT)
Dept: ULTRASOUND IMAGING | Facility: CLINIC | Age: 51
End: 2021-09-14
Payer: COMMERCIAL

## 2021-09-14 ENCOUNTER — OUTPATIENT (OUTPATIENT)
Dept: OUTPATIENT SERVICES | Facility: HOSPITAL | Age: 51
LOS: 1 days | End: 2021-09-14
Payer: COMMERCIAL

## 2021-09-14 DIAGNOSIS — R74.8 ABNORMAL LEVELS OF OTHER SERUM ENZYMES: ICD-10-CM

## 2021-09-14 DIAGNOSIS — D72.829 ELEVATED WHITE BLOOD CELL COUNT, UNSPECIFIED: ICD-10-CM

## 2021-09-14 DIAGNOSIS — R71.8 OTHER ABNORMALITY OF RED BLOOD CELLS: ICD-10-CM

## 2021-09-14 DIAGNOSIS — Z86.2 PERSONAL HISTORY OF DISEASES OF THE BLOOD AND BLOOD-FORMING ORGANS AND CERTAIN DISORDERS INVOLVING THE IMMUNE MECHANISM: ICD-10-CM

## 2021-09-14 DIAGNOSIS — Z98.890 OTHER SPECIFIED POSTPROCEDURAL STATES: Chronic | ICD-10-CM

## 2021-09-14 PROCEDURE — 76700 US EXAM ABDOM COMPLETE: CPT | Mod: 26

## 2021-09-14 PROCEDURE — 76700 US EXAM ABDOM COMPLETE: CPT

## 2021-09-16 ENCOUNTER — APPOINTMENT (OUTPATIENT)
Dept: INTERNAL MEDICINE | Facility: CLINIC | Age: 51
End: 2021-09-16

## 2021-09-27 LAB
BASOPHILS # BLD AUTO: 0.03 K/UL
BASOPHILS NFR BLD AUTO: 0.3 %
EOSINOPHIL # BLD AUTO: 0.08 K/UL
EOSINOPHIL NFR BLD AUTO: 0.8 %
FERRITIN SERPL-MCNC: 37 NG/ML
FOLATE SERPL-MCNC: 15.9 NG/ML
HCT VFR BLD CALC: 43.1 %
HGB BLD-MCNC: 12.5 G/DL
IMM GRANULOCYTES NFR BLD AUTO: 0.4 %
IRON SATN MFR SERPL: 6 %
IRON SERPL-MCNC: 26 UG/DL
LYMPHOCYTES # BLD AUTO: 2.18 K/UL
LYMPHOCYTES NFR BLD AUTO: 20.5 %
MAN DIFF?: NORMAL
MCHC RBC-ENTMCNC: 24.6 PG
MCHC RBC-ENTMCNC: 29 GM/DL
MCV RBC AUTO: 84.8 FL
MONOCYTES # BLD AUTO: 0.59 K/UL
MONOCYTES NFR BLD AUTO: 5.5 %
NEUTROPHILS # BLD AUTO: 7.72 K/UL
NEUTROPHILS NFR BLD AUTO: 72.5 %
PLATELET # BLD AUTO: 404 K/UL
RBC # BLD: 5.08 M/UL
RBC # BLD: 5.08 M/UL
RBC # FLD: 19.7 %
RETICS # AUTO: 2.5 %
RETICS AGGREG/RBC NFR: 129 K/UL
TIBC SERPL-MCNC: 410 UG/DL
UIBC SERPL-MCNC: 384 UG/DL
VIT B12 SERPL-MCNC: 464 PG/ML
WBC # FLD AUTO: 10.64 K/UL

## 2021-09-28 LAB
EPO SERPL-MCNC: 44.1 MIU/ML
HGB A MFR BLD: 98.2 %
HGB A2 MFR BLD: 1.8 %
HGB FRACT BLD-IMP: NORMAL

## 2021-09-29 ENCOUNTER — OUTPATIENT (OUTPATIENT)
Dept: OUTPATIENT SERVICES | Facility: HOSPITAL | Age: 51
LOS: 1 days | Discharge: HOME | End: 2021-09-29

## 2021-09-29 DIAGNOSIS — Z98.890 OTHER SPECIFIED POSTPROCEDURAL STATES: Chronic | ICD-10-CM

## 2021-09-29 DIAGNOSIS — D72.829 ELEVATED WHITE BLOOD CELL COUNT, UNSPECIFIED: ICD-10-CM

## 2021-09-29 NOTE — OB HISTORY
[Menarche Age: ____] : age at menarche was [unfilled] [Currently In Menopause] : currently in menopause [___] : Living: [unfilled]

## 2021-09-30 ENCOUNTER — APPOINTMENT (OUTPATIENT)
Dept: HEMATOLOGY ONCOLOGY | Facility: CLINIC | Age: 51
End: 2021-09-30
Payer: COMMERCIAL

## 2021-09-30 PROCEDURE — 99213 OFFICE O/P EST LOW 20 MIN: CPT | Mod: 95

## 2021-09-30 NOTE — REASON FOR VISIT
[Home] : at home, [unfilled] , at the time of the visit. [Medical Office: ([facility])___] : at the medical office located in  [Verbal consent obtained from patient] : the patient, [unfilled] [Follow-Up Visit] : a follow-up visit for [FreeTextEntry2] : leukocytosis

## 2021-09-30 NOTE — PHYSICAL EXAM
[Normal] : affect appropriate [de-identified] : non-toxic appearing [de-identified] : breathing appeared unlabored [de-identified] : coloration appeared normal

## 2021-09-30 NOTE — HISTORY OF PRESENT ILLNESS
[de-identified] : 7/2021-Patient presented at the request of her PCP for leukocytosis, found on routine lab work by PCP-goes for lab work Q 3 months for Hemoglobin A1C. Also noted intermittent elevation of RBC. Erythropoietin level elevated at 43. CASE 2 mutation studies negative. BCR/ABL not detected. Has a h/o sleep apnea.\par \par \par \par Got COVID vaccines (Pfizer).\par  [de-identified] : Menses irregular and with "very heavy" flow when has it. Has endometriosis (no clear h/o uterine fibroids). Vaginal bleeding heavily for ~ 3 weeks.\par Has breast papilloma excision scheduled  10/22/21.\par Has sleep apnea-difficulty with CPAP machine so not using it.\par No current c/o CP, SOB, N/V/D, change in bowel/bladder habits. No fevers, recent infection, abnormal bruising. No c/o H/A, pruritis.\par

## 2021-09-30 NOTE — ASSESSMENT
[FreeTextEntry1] : Lab work, abdominal US results reviewed with patient.\par #1) Chronic, intermittent leukocytosis/thrombocytosis dating back at least to 10/2019 upon review of old lab work available. H/O chronically intermittently elevated hematocrit noted as well. Suspect may have reactive component to elevated Hct. in this patient with h/o sleep apnea, and elevated erythropoietin. Intermittent abnormalities suggestive of benign process currently. Most recent CBC with normal H/H.\par ?Hb electrophoresis pattern secondary to alpha or delta thalassemia trait vs. iron deficiency-d/w patient potential family implications. Plan to supplement iron and follow clinically.\par \par #2) Suspect decreased iron associated with menstrual blood loss. To increase PO iron supplement with interval f/u lab work to be done. If refractory iron deficiency, t/c f/u GI evaluation as well. Note-recommended GI MD f/u for fatty liver also.\par Have ordered screening coag. studies, VWD studies in light of menorrhagia.\par \par #3) PO vitamin B 12 for a vitamin B 12 level <400.\par \par If hematologic scenario worsens/changes, can decide if prudent to pursue further evaluation/BMB to r/o underlying, evolving BM disorder.\par \par Patient was given the opportunity to ask questions, and her questions have been answered to her apparent satisfaction at this time. She is agreeable to recommended f/u.\par

## 2021-10-04 ENCOUNTER — RX RENEWAL (OUTPATIENT)
Age: 51
End: 2021-10-04

## 2021-10-04 ENCOUNTER — APPOINTMENT (OUTPATIENT)
Dept: INTERNAL MEDICINE | Facility: CLINIC | Age: 51
End: 2021-10-04
Payer: COMMERCIAL

## 2021-10-04 ENCOUNTER — LABORATORY RESULT (OUTPATIENT)
Age: 51
End: 2021-10-04

## 2021-10-04 ENCOUNTER — MED ADMIN CHARGE (OUTPATIENT)
Age: 51
End: 2021-10-04

## 2021-10-04 VITALS
HEART RATE: 91 BPM | DIASTOLIC BLOOD PRESSURE: 84 MMHG | TEMPERATURE: 97.8 F | WEIGHT: 243 LBS | SYSTOLIC BLOOD PRESSURE: 142 MMHG | OXYGEN SATURATION: 99 % | RESPIRATION RATE: 16 BRPM | HEIGHT: 66 IN | BODY MASS INDEX: 39.05 KG/M2

## 2021-10-04 DIAGNOSIS — E11.9 TYPE 2 DIABETES MELLITUS W/OUT COMPLICATIONS: ICD-10-CM

## 2021-10-04 DIAGNOSIS — R79.89 OTHER SPECIFIED ABNORMAL FINDINGS OF BLOOD CHEMISTRY: ICD-10-CM

## 2021-10-04 DIAGNOSIS — E78.5 HYPERLIPIDEMIA, UNSPECIFIED: ICD-10-CM

## 2021-10-04 PROCEDURE — 90686 IIV4 VACC NO PRSV 0.5 ML IM: CPT

## 2021-10-04 PROCEDURE — G0008: CPT

## 2021-10-04 PROCEDURE — 36415 COLL VENOUS BLD VENIPUNCTURE: CPT

## 2021-10-04 PROCEDURE — 99214 OFFICE O/P EST MOD 30 MIN: CPT | Mod: 25

## 2021-10-04 NOTE — PHYSICAL EXAM
[Well Nourished] : well nourished [Well Developed] : well developed [Well-Appearing] : well-appearing [Normal Sclera/Conjunctiva] : normal sclera/conjunctiva [PERRL] : pupils equal round and reactive to light [EOMI] : extraocular movements intact [Normal Outer Ear/Nose] : the outer ears and nose were normal in appearance [Normal Oropharynx] : the oropharynx was normal [No JVD] : no jugular venous distention [No Lymphadenopathy] : no lymphadenopathy [Supple] : supple [Thyroid Normal, No Nodules] : the thyroid was normal and there were no nodules present [No Respiratory Distress] : no respiratory distress  [No Accessory Muscle Use] : no accessory muscle use [Clear to Auscultation] : lungs were clear to auscultation bilaterally [Normal Rate] : normal rate  [Regular Rhythm] : with a regular rhythm [Normal S1, S2] : normal S1 and S2 [No Carotid Bruits] : no carotid bruits [No Abdominal Bruit] : a ~M bruit was not heard ~T in the abdomen [No Varicosities] : no varicosities [Pedal Pulses Present] : the pedal pulses are present [No Edema] : there was no peripheral edema [No Palpable Aorta] : no palpable aorta [No Extremity Clubbing/Cyanosis] : no extremity clubbing/cyanosis [Soft] : abdomen soft [Non Tender] : non-tender [Non-distended] : non-distended [No Masses] : no abdominal mass palpated [No HSM] : no HSM [Normal Bowel Sounds] : normal bowel sounds [Normal Posterior Cervical Nodes] : no posterior cervical lymphadenopathy [Normal Anterior Cervical Nodes] : no anterior cervical lymphadenopathy [No CVA Tenderness] : no CVA  tenderness [No Spinal Tenderness] : no spinal tenderness [No Joint Swelling] : no joint swelling [Grossly Normal Strength/Tone] : grossly normal strength/tone [No Rash] : no rash [Both Ears Pierced] : both ears [Tattoo - Single] : a single tattoo was observed [Coordination Grossly Intact] : coordination grossly intact [No Focal Deficits] : no focal deficits [Normal Gait] : normal gait [Deep Tendon Reflexes (DTR)] : deep tendon reflexes were 2+ and symmetric [Speech Grossly Normal] : speech grossly normal [Normal Affect] : the affect was normal [Alert and Oriented x3] : oriented to person, place, and time [Normal Mood] : the mood was normal [Normal Insight/Judgement] : insight and judgment were intact [de-identified] : w/ murmur  [de-identified] : obese

## 2021-10-04 NOTE — DATA REVIEWED
[No studies available for review at this time.] : No studies available for review at this time. [FreeTextEntry1] : reviewed recent CBC and iron level

## 2021-10-04 NOTE — PLAN
[FreeTextEntry1] : Endocrinology\par elevated TSH - Check TSH and free t4\par \par Diabetes - continue Xultophy 18-unit subcutaneous injections q.d. as directed and Farxiga 10mg p.o.q.d; continue Rosuvastatin Calcium 40mg p.o.q.d; discussed starting Saxenda injections, patient to consider and follow up; - continue low carbohydrate/low sugar diet \par ASA daily \par Advised yearly retinal eye exam.\par Check a1c today \par Continue with Crestor and added Ramipril today - discussed renal protection - Reviewed side effects to meds \par \par Cardiology\par hyperlipidemia - continue Rosuvastatin Calcium 40 mg p.o.q.d. and Ezetimibe 10 mg p.o.q.d. - continue low cholesterol/low fat diet. Check FLP/LFt \par \par Cardio - Hypertension -  Patient was educated about hypertension and the importance of controlling the pressure through lifestyle modification which include low sodium  diet and  aerobic  exercise.  Also discussed the use of prescription medication which included their benefits and their side effects. We discussed the use of ASA 81 mg daily.   Having a BMI less than or equal to 25. Start Altace 5 mg daily - monitor BP \par RTO in 3 weeks for a bp check \par \par Immunization - Consent signed - Flu shot given - education givne \par \par Shingrix - discussed, patient to determine if vaccine is covered under medical benefits of current insurance plan and follow up for 1st dose if interested; otherwise, instructed to follow up at the pharmacy for vaccine \par \par Patient  education  - COVID-19   Counseling and education provided to the patient.  Advised sign and symptoms of the virus.  Advised contact precautions.  Educated patient on proper hand washing and to participate in social distancing. Advised to wait 3 weeks and get the covid booster shot \par Patient is in full awareness of the plan and agrees to it.  All pt question was answered.  \par \par Breast surgeon - follow up with surgeon as discussed-  \par Follow up with hem/onc

## 2021-10-04 NOTE — HISTORY OF PRESENT ILLNESS
[FreeTextEntry1] : Fasting labs, general check up and a flu shot  [de-identified] : Mrs. VELAZQUEZ is a 51 year  female, who present to the office for fasting labs.  States her  blood glucose basically averaging  about 150 in the am.  Denies having hypoglycemic episodes. \par States she  was recently on vacation and gained weight.\par Worried about her blood pressure - States over her last few visit she had elevated readings.  Does have a  slight headache at times.\par Going to see heme in the am for blood test to check for clotting disorders \par

## 2021-10-04 NOTE — ASSESSMENT
[FreeTextEntry1] : Patient is a 51 year old female with a past medical history as above who presents for fasting labs, flu shot,  and a general check up

## 2021-10-04 NOTE — HEALTH RISK ASSESSMENT
[Yes] : Yes [2 - 3 times a week (3 pts)] : 2 - 3  times a week (3 points) [3 or 4 (1 pt)] : 3 or 4  (1 point) [Less than monthly (1 pt)] : Less than monthly (1 point) [No] : In the past 12 months have you used drugs other than those required for medical reasons? No [No falls in past year] : Patient reported no falls in the past year [1] : 1) Little interest or pleasure doing things for several days (1) [0] : 2) Feeling down, depressed, or hopeless: Not at all (0) [Patient reported mammogram was normal] : Patient reported mammogram was normal [Patient reported PAP Smear was normal] : Patient reported PAP Smear was normal [] : No [YearQuit] : 2003 [Audit-CScore] : 5 [[id number]] : 1 [MammogramDate] : 06/21 [MammogramComments] : BI-RADS 1 [PapSmearDate] : 02/19 [PapSmearComments] : NILM, HPV negative.  [ColonoscopyDate] : 10/19 [ColonoscopyComments] : Diverticulosis in sigmoid colon/descending colon; 1 3mm polyp in sigmoid colon; internal hemorrhoids.  [HIVDate] : 10/19 [HIVComments] : Negative.

## 2021-10-12 ENCOUNTER — NON-APPOINTMENT (OUTPATIENT)
Age: 51
End: 2021-10-12

## 2021-10-12 LAB
APTT BLD: 38.5 SEC
INR PPP: 1.02 RATIO
PT BLD: 12 SEC

## 2021-10-13 LAB
25(OH)D3 SERPL-MCNC: 36.5 NG/ML
ALBUMIN SERPL ELPH-MCNC: 4 G/DL
ALP BLD-CCNC: 118 U/L
ALT SERPL-CCNC: 21 U/L
ANION GAP SERPL CALC-SCNC: 12 MMOL/L
AST SERPL-CCNC: 17 U/L
BASOPHILS # BLD AUTO: 0.04 K/UL
BASOPHILS NFR BLD AUTO: 0.4 %
BILIRUB SERPL-MCNC: 0.3 MG/DL
BUN SERPL-MCNC: 9 MG/DL
CALCIUM SERPL-MCNC: 9.1 MG/DL
CHLORIDE SERPL-SCNC: 102 MMOL/L
CHOLEST SERPL-MCNC: 148 MG/DL
CO2 SERPL-SCNC: 24 MMOL/L
CREAT SERPL-MCNC: 0.6 MG/DL
EOSINOPHIL # BLD AUTO: 0.05 K/UL
EOSINOPHIL NFR BLD AUTO: 0.5 %
ESTIMATED AVERAGE GLUCOSE: 171 MG/DL
FACT VIII ACT/NOR PPP: 142 %
GLUCOSE SERPL-MCNC: 129 MG/DL
HBA1C MFR BLD HPLC: 7.6 %
HCT VFR BLD CALC: 45.2 %
HDLC SERPL-MCNC: 46 MG/DL
HGB BLD-MCNC: 13 G/DL
IMM GRANULOCYTES NFR BLD AUTO: 0.3 %
LDLC SERPL CALC-MCNC: 83 MG/DL
LYMPHOCYTES # BLD AUTO: 1.84 K/UL
LYMPHOCYTES NFR BLD AUTO: 17.3 %
MAN DIFF?: NORMAL
MCHC RBC-ENTMCNC: 24.9 PG
MCHC RBC-ENTMCNC: 28.8 GM/DL
MCV RBC AUTO: 86.6 FL
MONOCYTES # BLD AUTO: 0.67 K/UL
MONOCYTES NFR BLD AUTO: 6.3 %
NEUTROPHILS # BLD AUTO: 8.03 K/UL
NEUTROPHILS NFR BLD AUTO: 75.2 %
NONHDLC SERPL-MCNC: 102 MG/DL
PLATELET # BLD AUTO: 388 K/UL
POTASSIUM SERPL-SCNC: 4.5 MMOL/L
PROT SERPL-MCNC: 7.2 G/DL
RBC # BLD: 5.22 M/UL
RBC # FLD: 20.4 %
SODIUM SERPL-SCNC: 139 MMOL/L
T4 FREE SERPL-MCNC: 1 NG/DL
TRIGL SERPL-MCNC: 96 MG/DL
TSH SERPL-ACNC: 3.1 UIU/ML
VWF AG PPP IA-ACNC: 128 %
VWF:RCO ACT/NOR PPP PL AGG: 97 %
WBC # FLD AUTO: 10.66 K/UL

## 2021-10-19 ENCOUNTER — APPOINTMENT (OUTPATIENT)
Dept: INTERNAL MEDICINE | Facility: CLINIC | Age: 51
End: 2021-10-19
Payer: COMMERCIAL

## 2021-10-19 VITALS
DIASTOLIC BLOOD PRESSURE: 84 MMHG | BODY MASS INDEX: 38.57 KG/M2 | OXYGEN SATURATION: 98 % | TEMPERATURE: 97.8 F | WEIGHT: 240 LBS | HEART RATE: 93 BPM | HEIGHT: 66 IN | RESPIRATION RATE: 16 BRPM | SYSTOLIC BLOOD PRESSURE: 130 MMHG

## 2021-10-19 DIAGNOSIS — R82.90 UNSPECIFIED ABNORMAL FINDINGS IN URINE: ICD-10-CM

## 2021-10-19 DIAGNOSIS — R92.8 OTHER ABNORMAL AND INCONCLUSIVE FINDINGS ON DIAGNOSTIC IMAGING OF BREAST: ICD-10-CM

## 2021-10-19 DIAGNOSIS — Z86.79 PERSONAL HISTORY OF OTHER DISEASES OF THE CIRCULATORY SYSTEM: ICD-10-CM

## 2021-10-19 DIAGNOSIS — Z87.898 PERSONAL HISTORY OF OTHER SPECIFIED CONDITIONS: ICD-10-CM

## 2021-10-19 DIAGNOSIS — R74.8 ABNORMAL LEVELS OF OTHER SERUM ENZYMES: ICD-10-CM

## 2021-10-19 PROCEDURE — 93000 ELECTROCARDIOGRAM COMPLETE: CPT

## 2021-10-19 PROCEDURE — 99214 OFFICE O/P EST MOD 30 MIN: CPT | Mod: 25

## 2021-10-19 NOTE — COUNSELING
[Weight management counseling provided] : Weight management [Healthy eating counseling provided] : healthy eating [Good understanding] : Patient has a good understanding of disease, goals and obesity follow-up plan [Decrease Portions] : Decrease food portions

## 2021-10-21 PROBLEM — R92.8 ABNORMAL MAMMOGRAM: Status: ACTIVE | Noted: 2021-06-16

## 2021-10-21 NOTE — HISTORY OF PRESENT ILLNESS
[Sleep Apnea] : sleep apnea [No Pertinent Cardiac History] : no history of aortic stenosis, atrial fibrillation, coronary artery disease, recent myocardial infarction, or implantable device/pacemaker [Smoker] : smoker [Diabetes] : diabetes [(Patient denies any chest pain, claudication, dyspnea on exertion, orthopnea, palpitations or syncope)] : Patient denies any chest pain, claudication, dyspnea on exertion, orthopnea, palpitations or syncope [Aortic Stenosis] : no aortic stenosis [Atrial Fibrillation] : no atrial fibrillation [Coronary Artery Disease] : no coronary artery disease [Recent Myocardial Infarction] : no recent myocardial infarction [Implantable Device/Pacemaker] : no implantable device/pacemaker [Asthma] : no asthma [COPD] : no COPD [Family Member] : no family member with adverse anesthesia reaction/sudden death [Self] : no previous adverse anesthesia reaction [Chronic Anticoagulation] : no chronic anticoagulation [Chronic Kidney Disease] : no chronic kidney disease [FreeTextEntry1] : Excision of left  breast papilloma [FreeTextEntry2] : 10/22/21 [FreeTextEntry3] : Dr. Dain Cotton [FreeTextEntry4] : Mrs.. VELAZQUEZ is a 51 year  female, who present to the office for medical clearance for breast surgery. \par Procedure will be done at [facility] with Dr. Cotton [FreeTextEntry5] : former   SMoker       LAURO -  Hx LAURO can not tolerate cpap

## 2021-10-21 NOTE — ASSESSMENT
[Patient Optimized for Surgery] : Patient optimized for surgery [No Further Testing Recommended] : no further testing recommended [FreeTextEntry4] : Ms. VELAZQUEZ is a 51 year  female, who present to the office for medical clearance  [FreeTextEntry5] : hold ASA

## 2021-10-21 NOTE — PLAN
[FreeTextEntry1] : Patient history, physical and ancillary testing was reviewed by  Dr. Venancio Lagos. \par ANUM VELAZQUEZ  was advised not to take any NSAIDs, ASA, Vitamin E, omega 3, ginkgo biloba or MVI 7 days prior to the surgery. \par \par Patient was advised to take Ramipril the morning of her surgery with a sip of water.\par No eating after midnight on 10/21/21.\par Advised not to take Farixiga \par \par Patient in optimal condition for proposed procedure and anesthesia \par \par HX of LAURO - Monitor Oxygen during and post op

## 2021-10-21 NOTE — REVIEW OF SYSTEMS
[Easy Bleeding] : easy bleeding [Negative] : Heme/Lymph [Fever] : no fever [Chills] : no chills [Fatigue] : no fatigue [Night Sweats] : no night sweats [Discharge] : no discharge [Pain] : no pain [Vision Problems] : no vision problems [Itching] : no itching [Earache] : no earache [Nosebleed] : no nosebleeds [Sore Throat] : no sore throat [Chest Pain] : no chest pain [Palpitations] : no palpitations [Leg Claudication] : no leg claudication [Lower Ext Edema] : no lower extremity edema [Shortness Of Breath] : no shortness of breath [Wheezing] : no wheezing [Cough] : no cough [Dyspnea on Exertion] : no dyspnea on exertion [Abdominal Pain] : no abdominal pain [Nausea] : no nausea [Constipation] : no constipation [Diarrhea] : diarrhea [Vomiting] : no vomiting [Heartburn] : no heartburn [Melena] : no melena [Dysuria] : no dysuria [Hematuria] : no hematuria [Frequency] : no frequency [Vaginal Discharge] : no vaginal discharge [Joint Pain] : no joint pain [Muscle Pain] : no muscle pain [Back Pain] : no back pain [Itching] : no itching [Mole Changes] : no mole changes [Skin Rash] : no skin rash [Headache] : no headache [Dizziness] : no dizziness [Memory Loss] : no memory loss [Unsteady Walking] : no ataxia [Easy Bruising] : no easy bruising [Swollen Glands] : no swollen glands

## 2021-10-21 NOTE — RESULTS/DATA
[] : results reviewed [de-identified] : wbc 10.16,  hgb 12.5,  hct 41.5, plt 401 [de-identified] : INR 1.0,  PT 10.9,  PTT 31.7 (H) [de-identified] : Glucose (150), BUN 9, Creatinine 0.7, BUN 9,  Ca 9.4, Na140, K+ 4.9, cl- 102 [de-identified] : NSR rate 77 [de-identified] : ALT 21, AST 17

## 2021-10-21 NOTE — PHYSICAL EXAM
[Well Nourished] : well nourished [Well Developed] : well developed [Well-Appearing] : well-appearing [Normal Sclera/Conjunctiva] : normal sclera/conjunctiva [PERRL] : pupils equal round and reactive to light [EOMI] : extraocular movements intact [Normal Outer Ear/Nose] : the outer ears and nose were normal in appearance [Normal Oropharynx] : the oropharynx was normal [No JVD] : no jugular venous distention [No Lymphadenopathy] : no lymphadenopathy [Supple] : supple [Thyroid Normal, No Nodules] : the thyroid was normal and there were no nodules present [No Respiratory Distress] : no respiratory distress  [No Accessory Muscle Use] : no accessory muscle use [Clear to Auscultation] : lungs were clear to auscultation bilaterally [Normal Rate] : normal rate  [Regular Rhythm] : with a regular rhythm [Normal S1, S2] : normal S1 and S2 [No Carotid Bruits] : no carotid bruits [No Abdominal Bruit] : a ~M bruit was not heard ~T in the abdomen [No Varicosities] : no varicosities [Pedal Pulses Present] : the pedal pulses are present [No Edema] : there was no peripheral edema [No Palpable Aorta] : no palpable aorta [No Extremity Clubbing/Cyanosis] : no extremity clubbing/cyanosis [Soft] : abdomen soft [Non Tender] : non-tender [Non-distended] : non-distended [No Masses] : no abdominal mass palpated [No HSM] : no HSM [Normal Bowel Sounds] : normal bowel sounds [Normal Posterior Cervical Nodes] : no posterior cervical lymphadenopathy [Normal Anterior Cervical Nodes] : no anterior cervical lymphadenopathy [No CVA Tenderness] : no CVA  tenderness [No Spinal Tenderness] : no spinal tenderness [No Joint Swelling] : no joint swelling [Grossly Normal Strength/Tone] : grossly normal strength/tone [No Rash] : no rash [Both Ears Pierced] : both ears [Tattoo - Single] : a single tattoo was observed [Coordination Grossly Intact] : coordination grossly intact [No Focal Deficits] : no focal deficits [Normal Gait] : normal gait [Deep Tendon Reflexes (DTR)] : deep tendon reflexes were 2+ and symmetric [Speech Grossly Normal] : speech grossly normal [Normal Affect] : the affect was normal [Alert and Oriented x3] : oriented to person, place, and time [Normal Mood] : the mood was normal [Normal Insight/Judgement] : insight and judgment were intact [de-identified] : w/ murmur  [de-identified] : obese

## 2021-10-26 ENCOUNTER — RX RENEWAL (OUTPATIENT)
Age: 51
End: 2021-10-26

## 2021-11-15 ENCOUNTER — APPOINTMENT (OUTPATIENT)
Dept: INTERNAL MEDICINE | Facility: CLINIC | Age: 51
End: 2021-11-15
Payer: COMMERCIAL

## 2021-11-15 VITALS
WEIGHT: 241 LBS | BODY MASS INDEX: 38.73 KG/M2 | TEMPERATURE: 97.9 F | SYSTOLIC BLOOD PRESSURE: 154 MMHG | RESPIRATION RATE: 16 BRPM | HEIGHT: 66 IN | HEART RATE: 91 BPM | OXYGEN SATURATION: 99 % | DIASTOLIC BLOOD PRESSURE: 80 MMHG

## 2021-11-15 VITALS — DIASTOLIC BLOOD PRESSURE: 82 MMHG | SYSTOLIC BLOOD PRESSURE: 136 MMHG

## 2021-11-15 PROCEDURE — 99214 OFFICE O/P EST MOD 30 MIN: CPT

## 2021-11-15 NOTE — ASSESSMENT
[FreeTextEntry1] : Patient is a 51 year old female with a past medical history as above who presents for a blood pressure check s/p post breast excision

## 2021-11-15 NOTE — HISTORY OF PRESENT ILLNESS
[FreeTextEntry1] : Blood pressure check  [de-identified] : Mrs. VELAZQUEZ is a 51 year  female, who present to the office for blood pressure check.  states the other day at the surgeon office her blood pressure was elevated.  States since than had a couple days of a headache so wanted to make sure the blood pressure is normal.  States since the surgery she been doing good with her diet and trying to walk at night.  Blood glucose this am was  123

## 2021-11-15 NOTE — PLAN
[FreeTextEntry1] : Diabetes - continue Xultophy 21 -unit subcutaneous injections q.d. as directed and Farxiga 10mg p.o.q.d; continue Rosuvastatin Calcium 40mg p.o.q.d;   ASA daily  \par Advised yearly retinal eye exam.\par Continue with Crestor and added Ramipril today - discussed renal protection - Reviewed side effects to medications \par Advised to monitor blood glucose levels and call the office next week with the readings \par \par Cardiology\par hyperlipidemia - continue Rosuvastatin Calcium 40 mg p.o.q.d. and Ezetimibe 10 mg p.o.q.d. - continue low cholesterol/low fat diet. Check FLP/LFt \par \par Cardio - Hypertension -  Patient was educated about hypertension and the importance of controlling the pressure through lifestyle modification which include low sodium  diet and  aerobic  exercise.  Also discussed the use of prescription medication which included their benefits and their side effects. We discussed the use of ASA 81 mg daily.   Having a BMI less than or equal to 25.  Continue with Altace 5 mg daily - \par \par Shingrix - discussed, patient to determine if vaccine is covered under medical benefits of current insurance plan and follow up for 1st dose if interested; otherwise, instructed to follow up at the pharmacy for vaccine \par \par Patient  education  - COVID-19   Counseling and education provided to the patient.  Advised sign and symptoms of the virus.  Advised contact precautions.  Educated patient on proper hand washing and to participate in social distancing. Advised to wait 3 weeks and get the covid booster shot \par Patient is in full awareness of the plan and agrees to it.  All pt question was answered.  \par \par Breast surgeon - follow up with surgeon

## 2021-11-15 NOTE — REVIEW OF SYSTEMS
[Negative] : Constitutional [Fatigue] : no fatigue [Recent Change In Weight] : ~T no recent weight change [Nasal Discharge] : no nasal discharge [Palpitations] : no palpitations [Lower Ext Edema] : no lower extremity edema [Shortness Of Breath] : no shortness of breath [Cough] : no cough [Dyspnea on Exertion] : no dyspnea on exertion [Abdominal Pain] : no abdominal pain [Constipation] : no constipation [Diarrhea] : diarrhea [Vomiting] : no vomiting [Melena] : no melena [Dysuria] : no dysuria [Muscle Pain] : no muscle pain [Itching] : no itching [Skin Rash] : no skin rash [Headache] : no headache [Memory Loss] : no memory loss [Swollen Glands] : no swollen glands [FreeTextEntry1] : left breast pain but improving

## 2021-11-15 NOTE — HEALTH RISK ASSESSMENT
[Yes] : Yes [2 - 3 times a week (3 pts)] : 2 - 3  times a week (3 points) [3 or 4 (1 pt)] : 3 or 4  (1 point) [Less than monthly (1 pt)] : Less than monthly (1 point) [No] : In the past 12 months have you used drugs other than those required for medical reasons? No [No falls in past year] : Patient reported no falls in the past year [1] : 1) Little interest or pleasure doing things for several days (1) [0] : 2) Feeling down, depressed, or hopeless: Not at all (0) [Patient reported mammogram was normal] : Patient reported mammogram was normal [Patient reported PAP Smear was normal] : Patient reported PAP Smear was normal [] : No [YearQuit] : 2003 [Audit-CScore] : 5 [[id number]] : 1 [MammogramDate] : 06/21 [MammogramComments] : BI-RADS 1 [PapSmearDate] : 02/19 [PapSmearComments] : NILM, HPV negative.  [ColonoscopyDate] : 10/19 [ColonoscopyComments] : Diverticulosis in sigmoid colon/descending colon; 1 3mm polyp in sigmoid colon; internal hemorrhoids.  [HIVDate] : 10/19 [HIVComments] : Negative.

## 2021-11-15 NOTE — PHYSICAL EXAM
[Well Nourished] : well nourished [Well Developed] : well developed [Well-Appearing] : well-appearing [Normal Sclera/Conjunctiva] : normal sclera/conjunctiva [PERRL] : pupils equal round and reactive to light [EOMI] : extraocular movements intact [Normal Outer Ear/Nose] : the outer ears and nose were normal in appearance [Normal Oropharynx] : the oropharynx was normal [No JVD] : no jugular venous distention [No Lymphadenopathy] : no lymphadenopathy [Supple] : supple [Thyroid Normal, No Nodules] : the thyroid was normal and there were no nodules present [No Respiratory Distress] : no respiratory distress  [No Accessory Muscle Use] : no accessory muscle use [Clear to Auscultation] : lungs were clear to auscultation bilaterally [Normal Rate] : normal rate  [Regular Rhythm] : with a regular rhythm [Normal S1, S2] : normal S1 and S2 [No Carotid Bruits] : no carotid bruits [No Abdominal Bruit] : a ~M bruit was not heard ~T in the abdomen [No Varicosities] : no varicosities [Pedal Pulses Present] : the pedal pulses are present [No Edema] : there was no peripheral edema [No Palpable Aorta] : no palpable aorta [No Extremity Clubbing/Cyanosis] : no extremity clubbing/cyanosis [Soft] : abdomen soft [Non Tender] : non-tender [Non-distended] : non-distended [No Masses] : no abdominal mass palpated [No HSM] : no HSM [Normal Bowel Sounds] : normal bowel sounds [Normal Posterior Cervical Nodes] : no posterior cervical lymphadenopathy [Normal Anterior Cervical Nodes] : no anterior cervical lymphadenopathy [No CVA Tenderness] : no CVA  tenderness [No Spinal Tenderness] : no spinal tenderness [No Joint Swelling] : no joint swelling [Grossly Normal Strength/Tone] : grossly normal strength/tone [No Rash] : no rash [Both Ears Pierced] : both ears [Tattoo - Single] : a single tattoo was observed [Coordination Grossly Intact] : coordination grossly intact [No Focal Deficits] : no focal deficits [Normal Gait] : normal gait [Deep Tendon Reflexes (DTR)] : deep tendon reflexes were 2+ and symmetric [Speech Grossly Normal] : speech grossly normal [Normal Affect] : the affect was normal [Alert and Oriented x3] : oriented to person, place, and time [Normal Mood] : the mood was normal [Normal Insight/Judgement] : insight and judgment were intact [de-identified] : w/ murmur  [de-identified] : obese

## 2021-11-22 ENCOUNTER — RX RENEWAL (OUTPATIENT)
Age: 51
End: 2021-11-22

## 2021-12-08 ENCOUNTER — NON-APPOINTMENT (OUTPATIENT)
Age: 51
End: 2021-12-08

## 2021-12-13 ENCOUNTER — APPOINTMENT (OUTPATIENT)
Dept: OBGYN | Facility: CLINIC | Age: 51
End: 2021-12-13
Payer: COMMERCIAL

## 2021-12-13 PROCEDURE — 99214 OFFICE O/P EST MOD 30 MIN: CPT | Mod: 95

## 2021-12-13 NOTE — PHYSICAL EXAM
[Chaperone Present] : A chaperone was present in the examining room during all aspects of the physical examination [Appropriately responsive] : appropriately responsive [Alert] : alert [No Acute Distress] : no acute distress

## 2021-12-13 NOTE — REASON FOR VISIT
[Home] : at home, [unfilled] , at the time of the visit. [Other Location: e.g. Home (Enter Location, City,State)___] : at [unfilled] [Verbal consent obtained from patient] : the patient, [unfilled] [Follow-Up] : a follow-up evaluation of

## 2021-12-15 ENCOUNTER — APPOINTMENT (OUTPATIENT)
Dept: INTERNAL MEDICINE | Facility: CLINIC | Age: 51
End: 2021-12-15
Payer: COMMERCIAL

## 2021-12-15 VITALS
HEIGHT: 66 IN | BODY MASS INDEX: 38.73 KG/M2 | DIASTOLIC BLOOD PRESSURE: 82 MMHG | WEIGHT: 241 LBS | RESPIRATION RATE: 18 BRPM | TEMPERATURE: 36.3 F | SYSTOLIC BLOOD PRESSURE: 126 MMHG | HEART RATE: 97 BPM | OXYGEN SATURATION: 99 %

## 2021-12-15 DIAGNOSIS — R31.9 HEMATURIA, UNSPECIFIED: ICD-10-CM

## 2021-12-15 PROCEDURE — 99213 OFFICE O/P EST LOW 20 MIN: CPT | Mod: 25

## 2021-12-15 PROCEDURE — 81003 URINALYSIS AUTO W/O SCOPE: CPT | Mod: QW

## 2021-12-15 RX ORDER — CEFADROXIL 500 MG/1
500 CAPSULE ORAL
Qty: 14 | Refills: 0 | Status: COMPLETED | COMMUNITY
Start: 2021-11-02

## 2021-12-15 RX ORDER — TRAMADOL HYDROCHLORIDE 50 MG/1
50 TABLET, COATED ORAL
Qty: 20 | Refills: 0 | Status: COMPLETED | COMMUNITY
Start: 2021-10-22

## 2021-12-15 NOTE — PHYSICAL EXAM
[Well Nourished] : well nourished [Well Developed] : well developed [Well-Appearing] : well-appearing [Normal Sclera/Conjunctiva] : normal sclera/conjunctiva [PERRL] : pupils equal round and reactive to light [EOMI] : extraocular movements intact [Normal Outer Ear/Nose] : the outer ears and nose were normal in appearance [Normal Oropharynx] : the oropharynx was normal [No JVD] : no jugular venous distention [No Lymphadenopathy] : no lymphadenopathy [Supple] : supple [Thyroid Normal, No Nodules] : the thyroid was normal and there were no nodules present [No Respiratory Distress] : no respiratory distress  [No Accessory Muscle Use] : no accessory muscle use [Clear to Auscultation] : lungs were clear to auscultation bilaterally [Normal Rate] : normal rate  [Regular Rhythm] : with a regular rhythm [Normal S1, S2] : normal S1 and S2 [No Carotid Bruits] : no carotid bruits [No Abdominal Bruit] : a ~M bruit was not heard ~T in the abdomen [No Varicosities] : no varicosities [Pedal Pulses Present] : the pedal pulses are present [No Edema] : there was no peripheral edema [No Palpable Aorta] : no palpable aorta [No Extremity Clubbing/Cyanosis] : no extremity clubbing/cyanosis [Soft] : abdomen soft [Non Tender] : non-tender [Non-distended] : non-distended [No Masses] : no abdominal mass palpated [No HSM] : no HSM [Normal Bowel Sounds] : normal bowel sounds [Normal Posterior Cervical Nodes] : no posterior cervical lymphadenopathy [Normal Anterior Cervical Nodes] : no anterior cervical lymphadenopathy [No CVA Tenderness] : no CVA  tenderness [No Spinal Tenderness] : no spinal tenderness [No Joint Swelling] : no joint swelling [Grossly Normal Strength/Tone] : grossly normal strength/tone [No Rash] : no rash [Both Ears Pierced] : both ears [Tattoo - Single] : a single tattoo was observed [Coordination Grossly Intact] : coordination grossly intact [No Focal Deficits] : no focal deficits [Normal Gait] : normal gait [Deep Tendon Reflexes (DTR)] : deep tendon reflexes were 2+ and symmetric [Speech Grossly Normal] : speech grossly normal [Normal Affect] : the affect was normal [Alert and Oriented x3] : oriented to person, place, and time [Normal Mood] : the mood was normal [Normal Insight/Judgement] : insight and judgment were intact [de-identified] : w/ murmur  [de-identified] : obese

## 2021-12-15 NOTE — COUNSELING
[Encouraged to increase physical activity] : Encouraged to increase physical activity [Good understanding] : Patient has a good understanding of disease, goals and obesity follow-up plan [FreeTextEntry2] : ADA diet

## 2021-12-15 NOTE — REVIEW OF SYSTEMS
[Negative] : Heme/Lymph [Fatigue] : no fatigue [Recent Change In Weight] : ~T no recent weight change [Nasal Discharge] : no nasal discharge [Palpitations] : no palpitations [Lower Ext Edema] : no lower extremity edema [Shortness Of Breath] : no shortness of breath [Cough] : no cough [Dyspnea on Exertion] : no dyspnea on exertion [Abdominal Pain] : no abdominal pain [Constipation] : no constipation [Diarrhea] : diarrhea [Vomiting] : no vomiting [Melena] : no melena [Dysuria] : no dysuria [Muscle Pain] : no muscle pain [Itching] : no itching [Skin Rash] : no skin rash [Headache] : no headache [Memory Loss] : no memory loss [Swollen Glands] : no swollen glands [FreeTextEntry1] : left breast pain but improving

## 2021-12-15 NOTE — HISTORY OF PRESENT ILLNESS
[FreeTextEntry8] : Ms. VELAZQUEZ is a 51 year  female, who present to the office for right flank pain x 1-2 days -  Has blood in urine but also on menstrual cycle.  Denies fever, chills or night sweats.  Flank pain is exacerbated when pressing on it-  Denies truam to the area. \par Took Tylenol which helped

## 2021-12-15 NOTE — HEALTH RISK ASSESSMENT
[Yes] : Yes [2 - 3 times a week (3 pts)] : 2 - 3  times a week (3 points) [3 or 4 (1 pt)] : 3 or 4  (1 point) [Less than monthly (1 pt)] : Less than monthly (1 point) [No] : In the past 12 months have you used drugs other than those required for medical reasons? No [No falls in past year] : Patient reported no falls in the past year [1] : 1) Little interest or pleasure doing things for several days (1) [0] : 2) Feeling down, depressed, or hopeless: Not at all (0) [Patient reported mammogram was normal] : Patient reported mammogram was normal [Patient reported PAP Smear was normal] : Patient reported PAP Smear was normal [YearQuit] : 2003 [Audit-CScore] : 5 [[id number]] : 1 [MammogramDate] : 06/21 [MammogramComments] : BI-RADS 1 [PapSmearDate] : 02/19 [PapSmearComments] : NILM, HPV negative.  [ColonoscopyDate] : 10/19 [ColonoscopyComments] : Diverticulosis in sigmoid colon/descending colon; 1 3mm polyp in sigmoid colon; internal hemorrhoids.  [HIVDate] : 10/19 [HIVComments] : Negative.

## 2021-12-15 NOTE — PLAN
[FreeTextEntry1] : Ortho - Right flank pain - More likely musculare in nature - Continue tylenol  as needed - \par Will check urine culture -   Monitor for a rash\par \par - Hematuria - Flank pain - check urine culture\par \par Diabetes - continue Xultophy 21 -unit subcutaneous injections q.d. as directed and Farxiga 10mg p.o.q.d; continue Rosuvastatin Calcium 40mg p.o.q.d;   ASA daily  \par Advised yearly retinal eye exam.\par Continue with Crestor and added Ramipril today - discussed renal protection - Reviewed side effects to medications \par \par Cardiology\par hyperlipidemia - continue Rosuvastatin Calcium 40 mg p.o.q.d. and Ezetimibe 10 mg p.o.q.d. - continue low cholesterol/low fat diet. Check FLP/LFt \par \par Cardio - Hypertension -  Patient was educated about hypertension and the importance of controlling the pressure through lifestyle modification which include low sodium  diet and  aerobic  exercise.  Also discussed the use of prescription medication which included their benefits and their side effects. We discussed the use of ASA 81 mg daily.   Having a BMI less than or equal to 25.  Continue with Altace 5 mg daily -\par \par Patient  education  - COVID-19   Counseling and education provided to the patient.  Advised sign and symptoms of the virus.  Advised contact precautions.  Educated patient on proper hand washing and to participate in social distancing. Advised to wait 3 weeks and get the covid booster shot \par Patient is in full awareness of the plan and agrees to it.  All pt question was answered.  \par \par Breast surgeon - follow up with surgeon

## 2021-12-16 ENCOUNTER — RX RENEWAL (OUTPATIENT)
Age: 51
End: 2021-12-16

## 2021-12-20 LAB — BACTERIA UR CULT: NORMAL

## 2021-12-22 LAB — BACTERIA UR CULT: NORMAL

## 2021-12-23 ENCOUNTER — RX RENEWAL (OUTPATIENT)
Age: 51
End: 2021-12-23

## 2022-02-01 ENCOUNTER — RX RENEWAL (OUTPATIENT)
Age: 52
End: 2022-02-01

## 2022-02-07 LAB
BASOPHILS # BLD AUTO: 0.05 K/UL
BASOPHILS NFR BLD AUTO: 0.4 %
EOSINOPHIL # BLD AUTO: 0.1 K/UL
EOSINOPHIL NFR BLD AUTO: 0.8 %
FERRITIN SERPL-MCNC: 16 NG/ML
HCT VFR BLD CALC: 42.4 %
HGB BLD-MCNC: 11.7 G/DL
IMM GRANULOCYTES NFR BLD AUTO: 0.4 %
IRON SATN MFR SERPL: 4 %
IRON SERPL-MCNC: 20 UG/DL
LYMPHOCYTES # BLD AUTO: 2.51 K/UL
LYMPHOCYTES NFR BLD AUTO: 20.1 %
MAN DIFF?: NORMAL
MCHC RBC-ENTMCNC: 21.8 PG
MCHC RBC-ENTMCNC: 27.6 GM/DL
MCV RBC AUTO: 79 FL
MONOCYTES # BLD AUTO: 0.72 K/UL
MONOCYTES NFR BLD AUTO: 5.8 %
NEUTROPHILS # BLD AUTO: 9.03 K/UL
NEUTROPHILS NFR BLD AUTO: 72.5 %
PLATELET # BLD AUTO: 484 K/UL
RBC # BLD: 5.37 M/UL
RBC # BLD: 5.37 M/UL
RBC # FLD: 19 %
RETICS # AUTO: 1.8 %
RETICS AGGREG/RBC NFR: 96.1 K/UL
TIBC SERPL-MCNC: 453 UG/DL
UIBC SERPL-MCNC: 433 UG/DL
WBC # FLD AUTO: 12.46 K/UL

## 2022-02-08 ENCOUNTER — OUTPATIENT (OUTPATIENT)
Dept: OUTPATIENT SERVICES | Facility: HOSPITAL | Age: 52
LOS: 1 days | Discharge: HOME | End: 2022-02-08

## 2022-02-08 DIAGNOSIS — Z98.890 OTHER SPECIFIED POSTPROCEDURAL STATES: Chronic | ICD-10-CM

## 2022-02-08 DIAGNOSIS — D72.829 ELEVATED WHITE BLOOD CELL COUNT, UNSPECIFIED: ICD-10-CM

## 2022-02-09 DIAGNOSIS — N76.0 ACUTE VAGINITIS: ICD-10-CM

## 2022-02-10 ENCOUNTER — APPOINTMENT (OUTPATIENT)
Dept: HEMATOLOGY ONCOLOGY | Facility: CLINIC | Age: 52
End: 2022-02-10
Payer: COMMERCIAL

## 2022-02-10 PROCEDURE — 99213 OFFICE O/P EST LOW 20 MIN: CPT | Mod: 95

## 2022-02-10 NOTE — REASON FOR VISIT
[Home] : at home, [unfilled] , at the time of the visit. [Medical Office: ([facility])___] : at the medical office located in  [Verbal consent obtained from patient] : the patient, [unfilled] [Follow-Up Visit] : a follow-up visit for [FreeTextEntry2] : leukocytosis

## 2022-02-10 NOTE — REVIEW OF SYSTEMS
[Negative] : Allergic/Immunologic [Dysmenorrhea/Abn Vaginal Bleeding] : dysmenorrhea/abnormal vaginal bleeding [Easy Bruising] : no tendency for easy bruising

## 2022-02-10 NOTE — PHYSICAL EXAM
[Normal] : affect appropriate [de-identified] : non-toxic appearing [de-identified] : breathing appeared unlabored [de-identified] : coloration appeared normal

## 2022-02-10 NOTE — HISTORY OF PRESENT ILLNESS
[de-identified] : 7/2021-Patient presented at the request of her PCP for leukocytosis, found on routine lab work by PCP-goes for lab work Q 3 months for Hemoglobin A1C. Also noted intermittent elevation of RBC. Erythropoietin level elevated at 43. CASE 2 mutation studies negative. BCR/ABL not detected. Has a h/o sleep apnea.\par \par \par \par Got COVID vaccines ([employer]).\par  [de-identified] : Menses irregular and with "very heavy" flow. Has appt. next week for endometrial biopsy.\par Taking iron supplement at least once a day (not totally compliant with BID dosing advised).\par Has sleep apnea.\par No current c/o CP, SOB, N/V/D, change in bowel/bladder habits. No fevers, recent infection, abnormal bruising. No c/o H/A, pruritis.\par

## 2022-02-15 ENCOUNTER — RX RENEWAL (OUTPATIENT)
Age: 52
End: 2022-02-15

## 2022-02-16 ENCOUNTER — APPOINTMENT (OUTPATIENT)
Dept: OBGYN | Facility: CLINIC | Age: 52
End: 2022-02-16
Payer: COMMERCIAL

## 2022-02-17 ENCOUNTER — APPOINTMENT (OUTPATIENT)
Dept: INTERNAL MEDICINE | Facility: CLINIC | Age: 52
End: 2022-02-17
Payer: COMMERCIAL

## 2022-02-17 VITALS
HEIGHT: 66 IN | OXYGEN SATURATION: 98 % | RESPIRATION RATE: 16 BRPM | SYSTOLIC BLOOD PRESSURE: 120 MMHG | HEART RATE: 107 BPM | TEMPERATURE: 98.1 F | DIASTOLIC BLOOD PRESSURE: 70 MMHG | WEIGHT: 239 LBS | BODY MASS INDEX: 38.41 KG/M2

## 2022-02-17 DIAGNOSIS — R07.81 PLEURODYNIA: ICD-10-CM

## 2022-02-17 DIAGNOSIS — R10.9 UNSPECIFIED ABDOMINAL PAIN: ICD-10-CM

## 2022-02-17 DIAGNOSIS — Z87.898 PERSONAL HISTORY OF OTHER SPECIFIED CONDITIONS: ICD-10-CM

## 2022-02-17 DIAGNOSIS — U07.1 COVID-19: ICD-10-CM

## 2022-02-17 LAB
BILIRUB UR QL STRIP: NORMAL
CLARITY UR: CLEAR
COLLECTION METHOD: NORMAL
GLUCOSE UR-MCNC: >=1000
HCG UR QL: 0.2 EU/DL
HGB UR QL STRIP.AUTO: NORMAL
KETONES UR-MCNC: NORMAL
LEUKOCYTE ESTERASE UR QL STRIP: NORMAL
NITRITE UR QL STRIP: NORMAL
PH UR STRIP: 5
PROT UR STRIP-MCNC: NORMAL
SP GR UR STRIP: 1.02

## 2022-02-17 PROCEDURE — 99214 OFFICE O/P EST MOD 30 MIN: CPT | Mod: 25

## 2022-02-17 PROCEDURE — 81003 URINALYSIS AUTO W/O SCOPE: CPT | Mod: QW

## 2022-02-17 NOTE — PHYSICAL EXAM
[Well Nourished] : well nourished [Well Developed] : well developed [Well-Appearing] : well-appearing [Normal Sclera/Conjunctiva] : normal sclera/conjunctiva [PERRL] : pupils equal round and reactive to light [EOMI] : extraocular movements intact [Normal Outer Ear/Nose] : the outer ears and nose were normal in appearance [Normal Oropharynx] : the oropharynx was normal [No JVD] : no jugular venous distention [No Lymphadenopathy] : no lymphadenopathy [Supple] : supple [Thyroid Normal, No Nodules] : the thyroid was normal and there were no nodules present [No Respiratory Distress] : no respiratory distress  [No Accessory Muscle Use] : no accessory muscle use [Clear to Auscultation] : lungs were clear to auscultation bilaterally [Normal Rate] : normal rate  [Regular Rhythm] : with a regular rhythm [Normal S1, S2] : normal S1 and S2 [No Carotid Bruits] : no carotid bruits [No Abdominal Bruit] : a ~M bruit was not heard ~T in the abdomen [No Varicosities] : no varicosities [Pedal Pulses Present] : the pedal pulses are present [No Edema] : there was no peripheral edema [No Palpable Aorta] : no palpable aorta [No Extremity Clubbing/Cyanosis] : no extremity clubbing/cyanosis [Soft] : abdomen soft [Non Tender] : non-tender [Non-distended] : non-distended [No Masses] : no abdominal mass palpated [No HSM] : no HSM [Normal Bowel Sounds] : normal bowel sounds [Normal Posterior Cervical Nodes] : no posterior cervical lymphadenopathy [Normal Anterior Cervical Nodes] : no anterior cervical lymphadenopathy [No CVA Tenderness] : no CVA  tenderness [No Spinal Tenderness] : no spinal tenderness [No Joint Swelling] : no joint swelling [Grossly Normal Strength/Tone] : grossly normal strength/tone [No Rash] : no rash [Both Ears Pierced] : both ears [Tattoo - Single] : a single tattoo was observed [Coordination Grossly Intact] : coordination grossly intact [No Focal Deficits] : no focal deficits [Normal Gait] : normal gait [Deep Tendon Reflexes (DTR)] : deep tendon reflexes were 2+ and symmetric [Speech Grossly Normal] : speech grossly normal [Normal Affect] : the affect was normal [Alert and Oriented x3] : oriented to person, place, and time [Normal Mood] : the mood was normal [Normal Insight/Judgement] : insight and judgment were intact [de-identified] : w/ murmur  [de-identified] : obese [de-identified] : right rib cage pain to palp   no bruising noted  no rashes noted  no CVa tenderness

## 2022-02-17 NOTE — HISTORY OF PRESENT ILLNESS
[Episodic] : episodic [Moderate] : moderate [In the Morning] : in the morning [Stable] : stable [FreeTextEntry8] : Denies trauma to the area \par Denies blood in urine - Denies pain with urination-\par Denies fever or chills or cough - denies SOB \par \par Saw heme had elevated WBC  and low iron -  - Had more blood work done and a follow up appointment in a few weeks.\par Was advised to increase iron supplementation \par \par Has a endometrial biopsy next month with GYN \par \par recently had covid 19 infection (mild FLI)

## 2022-02-17 NOTE — HEALTH RISK ASSESSMENT
[Yes] : Yes [2 - 3 times a week (3 pts)] : 2 - 3  times a week (3 points) [3 or 4 (1 pt)] : 3 or 4  (1 point) [Less than monthly (1 pt)] : Less than monthly (1 point) [No] : In the past 12 months have you used drugs other than those required for medical reasons? No [No falls in past year] : Patient reported no falls in the past year [1] : 1) Little interest or pleasure doing things for several days (1) [0] : 2) Feeling down, depressed, or hopeless: Not at all (0) [YearQuit] : 2003 [Audit-CScore] : 5 [[id number]] : 1

## 2022-02-17 NOTE — PAST MEDICAL HISTORY
[Menstruating] : hx of menstruating [Approximately ___ (Month)] : the LMP was approximately [unfilled] month(s) ago [Irregular Cycle Intervals] : are  irregular [Definite ___ (Date)] : the last menstrual period was [unfilled]

## 2022-02-17 NOTE — REVIEW OF SYSTEMS
[Negative] : Heme/Lymph [Dysmenorrhea] : dysmenorrhea [Fatigue] : no fatigue [Recent Change In Weight] : ~T no recent weight change [Nasal Discharge] : no nasal discharge [Palpitations] : no palpitations [Lower Ext Edema] : no lower extremity edema [Shortness Of Breath] : no shortness of breath [Cough] : no cough [Dyspnea on Exertion] : no dyspnea on exertion [Abdominal Pain] : no abdominal pain [Constipation] : no constipation [Diarrhea] : diarrhea [Vomiting] : no vomiting [Melena] : no melena [Dysuria] : no dysuria [Muscle Pain] : no muscle pain [Itching] : no itching [Skin Rash] : no skin rash [Headache] : no headache [Memory Loss] : no memory loss [Swollen Glands] : no swollen glands [FreeTextEntry8] : heavy menses  [FreeTextEntry9] : right mid back to flank pain   [FreeTextEntry1] : left breast pain but improving

## 2022-02-17 NOTE — ASSESSMENT
[FreeTextEntry1] : Mrs. VELAZQUEZ is a 51 year  female, who present to the office for right flank/ back pain

## 2022-02-17 NOTE — DATA REVIEWED
[FreeTextEntry1] : U/A noted   no leukocyte  or nitrite - glucosuria noted  on farxiga \par \par Reviewed prior wbc for the last year  one reading was normal  rest was elevated  mid 10 range

## 2022-02-17 NOTE — PLAN
[FreeTextEntry1] : Ortho - Right flank pain - Check U/a was normal-  Will check rib x-ray if negative and still symptomatic will need to follow up with CT scan. Pt understands and agrees with the plan \par \par Heme - Leukocytosis - follow up with heme -  repeat CBC\par Iron deficiency - Increase iron and follow up with GYN  for endometrial biopsy \par Consider iron infusion \par \par - Hematuria - Flank pain - check urine culture\par \par Diabetes - continue Xultophy 21 -unit subcutaneous injections q.d. as directed and Farxiga 10mg p.o.q.d; continue Rosuvastatin Calcium 40mg p.o.q.d;   ASA daily  \par Advised yearly retinal eye exam.\par Continue with Crestor and added Ramipril today - discussed renal protection - Reviewed side effects to medications \par \par Cardiology\par hyperlipidemia - continue Rosuvastatin Calcium 40 mg p.o.q.d. and Ezetimibe 10 mg p.o.q.d. - continue low cholesterol/low fat diet. Check FLP/LFt \par \par Cardio - Hypertension -  Patient was educated about hypertension and the importance of controlling the pressure through lifestyle modification which include low sodium  diet and  aerobic  exercise.  Also discussed the use of prescription medication which included their benefits and their side effects. We discussed the use of ASA 81 mg daily.   Having a BMI less than or equal to 25.  Continue with Altace 5 mg daily -\par \par Patient  education  - COVID-19   Counseling and education provided to the patient.  Advised sign and symptoms of the virus.  Advised contact precautions.  Educated patient on proper hand washing and to participate in social distancing. Advised to wait 3 weeks and get the covid booster shot \par Patient is in full awareness of the plan and agrees to it.  All pt question was answered.  \par \par I spent 30  Minutes with the patient, half of which we discussed finding on physical exam  and coordinated care.  As well as reviewed my plans and follow ups.

## 2022-02-18 ENCOUNTER — APPOINTMENT (OUTPATIENT)
Dept: RADIOLOGY | Facility: CLINIC | Age: 52
End: 2022-02-18
Payer: COMMERCIAL

## 2022-02-18 ENCOUNTER — OUTPATIENT (OUTPATIENT)
Dept: OUTPATIENT SERVICES | Facility: HOSPITAL | Age: 52
LOS: 1 days | End: 2022-02-18
Payer: COMMERCIAL

## 2022-02-18 ENCOUNTER — RESULT REVIEW (OUTPATIENT)
Age: 52
End: 2022-02-18

## 2022-02-18 DIAGNOSIS — R10.9 UNSPECIFIED ABDOMINAL PAIN: ICD-10-CM

## 2022-02-18 DIAGNOSIS — R07.81 PLEURODYNIA: ICD-10-CM

## 2022-02-18 DIAGNOSIS — Z98.890 OTHER SPECIFIED POSTPROCEDURAL STATES: Chronic | ICD-10-CM

## 2022-02-18 PROCEDURE — 71100 X-RAY EXAM RIBS UNI 2 VIEWS: CPT | Mod: 26,RT

## 2022-02-18 PROCEDURE — 71100 X-RAY EXAM RIBS UNI 2 VIEWS: CPT

## 2022-03-01 ENCOUNTER — RX RENEWAL (OUTPATIENT)
Age: 52
End: 2022-03-01

## 2022-03-09 ENCOUNTER — APPOINTMENT (OUTPATIENT)
Dept: OBGYN | Facility: CLINIC | Age: 52
End: 2022-03-09
Payer: COMMERCIAL

## 2022-03-09 ENCOUNTER — ASOB RESULT (OUTPATIENT)
Age: 52
End: 2022-03-09

## 2022-03-09 VITALS
SYSTOLIC BLOOD PRESSURE: 120 MMHG | HEIGHT: 66 IN | BODY MASS INDEX: 38.57 KG/M2 | WEIGHT: 240 LBS | DIASTOLIC BLOOD PRESSURE: 74 MMHG | HEART RATE: 102 BPM

## 2022-03-09 LAB
HCG UR QL: NEGATIVE
QUALITY CONTROL: YES

## 2022-03-09 PROCEDURE — 58100 BIOPSY OF UTERUS LINING: CPT

## 2022-03-09 PROCEDURE — 81025 URINE PREGNANCY TEST: CPT

## 2022-03-09 PROCEDURE — 99213 OFFICE O/P EST LOW 20 MIN: CPT | Mod: 25

## 2022-03-09 NOTE — PROCEDURE
[Endometrial Biopsy] : Endometrial biopsy [Time out performed] : Pre-procedure time out performed.  Patient's name, date of birth and procedure confirmed. [Consent Obtained] : Consent obtained [Risks] : risks [Benefits] : benefits [Alternatives] : alternatives [Patient] : patient [Infection] : infection [Bleeding] : bleeding [Allergic Reaction] : allergic reaction [Uterine Perforation] : uterine perforation [Pain] : pain [Negative] : negative pregnancy test [Betadine] : Betadine [None] : none [Anteverted] : anteverted [Moderate] : moderate [Sent to Pathology] : placed in buffered formalin and sent for pathology [Tolerated Well] : Patient tolerated the procedure well [No Complications] : No complications [de-identified] : JESSY [de-identified] : allis

## 2022-03-11 LAB — HPV HIGH+LOW RISK DNA PNL CVX: NOT DETECTED

## 2022-03-14 LAB
CORE LAB BIOPSY: NORMAL
CYTOLOGY CVX/VAG DOC THIN PREP: ABNORMAL

## 2022-03-28 ENCOUNTER — RX RENEWAL (OUTPATIENT)
Age: 52
End: 2022-03-28

## 2022-04-08 ENCOUNTER — LABORATORY RESULT (OUTPATIENT)
Age: 52
End: 2022-04-08

## 2022-04-08 LAB
APTT 2H P 1:4 NP PPP: 34.5 SEC
APTT 2H P INC PPP: 36.1 SEC
APTT IMM NP/PRE NP PPP: 34.7 SEC
APTT INV RATIO PPP: 37.9 SEC
BASOPHILS # BLD AUTO: 0.03 K/UL
BASOPHILS NFR BLD AUTO: 0.3 %
EOSINOPHIL # BLD AUTO: 0.11 K/UL
EOSINOPHIL NFR BLD AUTO: 1.1 %
FACT IX ACT/NOR PPP: 127 %
FACT VIII ACT/NOR PPP: 145 %
FACT XI ACT/NOR PPP: 115 %
FACT XII PPP CHRO-ACNC: 109 %
FERRITIN SERPL-MCNC: 49 NG/ML
FOLATE SERPL-MCNC: 17.5 NG/ML
HCT VFR BLD CALC: 45.4 %
HGB BLD-MCNC: 14.1 G/DL
IMM GRANULOCYTES NFR BLD AUTO: 0.2 %
IRON SATN MFR SERPL: 12 %
IRON SERPL-MCNC: 44 UG/DL
LYMPHOCYTES # BLD AUTO: 2.75 K/UL
LYMPHOCYTES NFR BLD AUTO: 28.4 %
MAN DIFF?: NORMAL
MCHC RBC-ENTMCNC: 24.6 PG
MCHC RBC-ENTMCNC: 31.1 GM/DL
MCV RBC AUTO: 79.2 FL
MONOCYTES # BLD AUTO: 0.66 K/UL
MONOCYTES NFR BLD AUTO: 6.8 %
NEUTROPHILS # BLD AUTO: 6.11 K/UL
NEUTROPHILS NFR BLD AUTO: 63.2 %
NPP NORMAL POOLED PLASMA: 33.5 SECS
PLATELET # BLD AUTO: 365 K/UL
RBC # BLD: 5.73 M/UL
RBC # BLD: 5.73 M/UL
RBC # FLD: 22.7 %
RETICS # AUTO: 1.6 %
RETICS AGGREG/RBC NFR: 91.1 K/UL
TIBC SERPL-MCNC: 370 UG/DL
UIBC SERPL-MCNC: 326 UG/DL
VIT B12 SERPL-MCNC: 381 PG/ML
WBC # FLD AUTO: 9.68 K/UL

## 2022-04-10 ENCOUNTER — OUTPATIENT (OUTPATIENT)
Dept: OUTPATIENT SERVICES | Facility: HOSPITAL | Age: 52
LOS: 1 days | Discharge: HOME | End: 2022-04-10

## 2022-04-10 DIAGNOSIS — D72.829 ELEVATED WHITE BLOOD CELL COUNT, UNSPECIFIED: ICD-10-CM

## 2022-04-10 DIAGNOSIS — Z98.890 OTHER SPECIFIED POSTPROCEDURAL STATES: Chronic | ICD-10-CM

## 2022-04-13 ENCOUNTER — APPOINTMENT (OUTPATIENT)
Dept: HEMATOLOGY ONCOLOGY | Facility: CLINIC | Age: 52
End: 2022-04-13
Payer: COMMERCIAL

## 2022-04-13 LAB
APTT HEX PL PPP: NEGATIVE
HEX-1: 44.3 SECS
HEX-2: 40.8 SECS

## 2022-04-13 PROCEDURE — 99213 OFFICE O/P EST LOW 20 MIN: CPT | Mod: 95

## 2022-04-13 NOTE — HISTORY OF PRESENT ILLNESS
[de-identified] : 7/2021-Patient presented at the request of her PCP for leukocytosis, found on routine lab work by PCP-goes for lab work Q 3 months for Hemoglobin A1C. Also noted intermittent elevation of RBC. Erythropoietin level elevated at 43. CASE 2 mutation studies negative. BCR/ABL not detected. Has a h/o sleep apnea.\par \par \par \par \par  [de-identified] : S/P benign endometrial biopsy. Considering progesterone therapy, per GYN MD.\par Taking iron supplement BID.\par Has sleep apnea-does not use machine.\par No current c/o CP, SOB, N/V/D, change in bowel/bladder habits. No fevers, recent infection, abnormal bruising. No c/o H/A, pruritis.\par Had COVID infection 3 months ago.\par \par Got COVID vaccines (Pfizer). No booster yet.

## 2022-04-13 NOTE — PHYSICAL EXAM
[Normal] : affect appropriate [de-identified] : non-toxic appearing [de-identified] : breathing appeared unlabored [de-identified] : coloration appeared normal

## 2022-04-13 NOTE — REASON FOR VISIT
[Home] : at home, [unfilled] , at the time of the visit. [Medical Office: ([facility])___] : at the medical office located in  [Verbal consent obtained from patient] : the patient, [unfilled] [Follow-Up Visit] : a follow-up visit for [FreeTextEntry2] : leukocytosis

## 2022-04-13 NOTE — ASSESSMENT
[FreeTextEntry1] : Lab work results reviewed with patient.\par #1) Chronic, intermittent leukocytosis/thrombocytosis dating back at least to 9/2019 upon review of old lab work available. H/O chronically intermittently elevated hematocrit noted as well. Suspect may have reactive component to elevated Hct. in this patient with h/o sleep apnea, and elevated erythropoietin. Intermittent abnormalities suggestive of benign process currently. CASE 2 mutation studies, BCR-ABL unremarkable 7/2021.\par \par #2) Suspect h/o decreased iron associated with menstrual blood loss. To continue PO iron supplement with interval f/u lab work to be done. If refractory iron deficiency, t/c parenteral iron, and f/u GI evaluation as well. \par \par #3) Elevated PTT discussed-negative LA screen. Corrected with 50:50 mixing study with no factor 8, 9, 11, 12 deficiency. ?Prekallikrein, HMG kininogen related. VW Ag/activity WNL 10/2021. \par No FH of a bleeding disorder, and patient denies any personal h/o abnormal dar-procedural/operative bleeding or spontaneous mucosal bleeding. Follow clinically for now.\par \par #3) PO vitamin B 12 for a vitamin B 12 level <400.\par \par If hematologic scenario worsens/changes, can decide if prudent to pursue further evaluation/BMB to r/o underlying, evolving BM disorder.\par \par Patient was given the opportunity to ask questions, and her questions have been answered to her apparent satisfaction at this time. She is agreeable to recommended f/u.\par

## 2022-04-13 NOTE — CONSULT LETTER
[Dear  ___] : Dear  [unfilled], [Courtesy Letter:] : I had the pleasure of seeing your patient, [unfilled], in my office today. [Please see my note below.] : Please see my note below. [Consult Closing:] : Thank you very much for allowing me to participate in the care of this patient.  If you have any questions, please do not hesitate to contact me. [Sincerely,] : Sincerely, [FreeTextEntry3] : Valentine Solomon MD

## 2022-05-05 ENCOUNTER — RX RENEWAL (OUTPATIENT)
Age: 52
End: 2022-05-05

## 2022-05-25 ENCOUNTER — RX RENEWAL (OUTPATIENT)
Age: 52
End: 2022-05-25

## 2022-06-03 ENCOUNTER — EMERGENCY (EMERGENCY)
Facility: HOSPITAL | Age: 52
LOS: 1 days | Discharge: HOME | End: 2022-06-03
Attending: EMERGENCY MEDICINE | Admitting: EMERGENCY MEDICINE
Payer: COMMERCIAL

## 2022-06-03 VITALS
RESPIRATION RATE: 16 BRPM | HEART RATE: 106 BPM | TEMPERATURE: 99 F | WEIGHT: 244.93 LBS | DIASTOLIC BLOOD PRESSURE: 75 MMHG | OXYGEN SATURATION: 98 % | SYSTOLIC BLOOD PRESSURE: 137 MMHG | HEIGHT: 65 IN

## 2022-06-03 DIAGNOSIS — Z98.890 OTHER SPECIFIED POSTPROCEDURAL STATES: Chronic | ICD-10-CM

## 2022-06-03 LAB
ALBUMIN SERPL ELPH-MCNC: 3.1 G/DL — LOW (ref 3.3–5)
ALP SERPL-CCNC: 99 U/L — SIGNIFICANT CHANGE UP (ref 40–120)
ALT FLD-CCNC: 22 U/L — SIGNIFICANT CHANGE UP (ref 12–78)
ANION GAP SERPL CALC-SCNC: 8 MMOL/L — SIGNIFICANT CHANGE UP (ref 5–17)
APPEARANCE UR: ABNORMAL
AST SERPL-CCNC: 11 U/L — LOW (ref 15–37)
BACTERIA # UR AUTO: ABNORMAL
BASOPHILS # BLD AUTO: 0.04 K/UL — SIGNIFICANT CHANGE UP (ref 0–0.2)
BASOPHILS NFR BLD AUTO: 0.2 % — SIGNIFICANT CHANGE UP (ref 0–2)
BILIRUB SERPL-MCNC: 0.6 MG/DL — SIGNIFICANT CHANGE UP (ref 0.2–1.2)
BILIRUB UR-MCNC: NEGATIVE — SIGNIFICANT CHANGE UP
BUN SERPL-MCNC: 9 MG/DL — SIGNIFICANT CHANGE UP (ref 7–23)
CALCIUM SERPL-MCNC: 9.2 MG/DL — SIGNIFICANT CHANGE UP (ref 8.5–10.1)
CHLORIDE SERPL-SCNC: 102 MMOL/L — SIGNIFICANT CHANGE UP (ref 96–108)
CO2 SERPL-SCNC: 26 MMOL/L — SIGNIFICANT CHANGE UP (ref 22–31)
COLOR SPEC: YELLOW — SIGNIFICANT CHANGE UP
CREAT SERPL-MCNC: 0.65 MG/DL — SIGNIFICANT CHANGE UP (ref 0.5–1.3)
DIFF PNL FLD: ABNORMAL
EGFR: 105 ML/MIN/1.73M2 — SIGNIFICANT CHANGE UP
EOSINOPHIL # BLD AUTO: 0.04 K/UL — SIGNIFICANT CHANGE UP (ref 0–0.5)
EOSINOPHIL NFR BLD AUTO: 0.2 % — SIGNIFICANT CHANGE UP (ref 0–6)
EPI CELLS # UR: ABNORMAL
GLUCOSE SERPL-MCNC: 172 MG/DL — HIGH (ref 70–99)
GLUCOSE UR QL: 1000 MG/DL
HCG SERPL-ACNC: 3 MIU/ML — SIGNIFICANT CHANGE UP
HCT VFR BLD CALC: 46.9 % — HIGH (ref 34.5–45)
HGB BLD-MCNC: 15 G/DL — SIGNIFICANT CHANGE UP (ref 11.5–15.5)
IMM GRANULOCYTES NFR BLD AUTO: 0.5 % — SIGNIFICANT CHANGE UP (ref 0–1.5)
KETONES UR-MCNC: ABNORMAL
LEUKOCYTE ESTERASE UR-ACNC: ABNORMAL
LIDOCAIN IGE QN: 124 U/L — SIGNIFICANT CHANGE UP (ref 73–393)
LYMPHOCYTES # BLD AUTO: 1.6 K/UL — SIGNIFICANT CHANGE UP (ref 1–3.3)
LYMPHOCYTES # BLD AUTO: 9.3 % — LOW (ref 13–44)
MCHC RBC-ENTMCNC: 26.9 PG — LOW (ref 27–34)
MCHC RBC-ENTMCNC: 32 GM/DL — SIGNIFICANT CHANGE UP (ref 32–36)
MCV RBC AUTO: 84.2 FL — SIGNIFICANT CHANGE UP (ref 80–100)
MONOCYTES # BLD AUTO: 1.14 K/UL — HIGH (ref 0–0.9)
MONOCYTES NFR BLD AUTO: 6.6 % — SIGNIFICANT CHANGE UP (ref 2–14)
NEUTROPHILS # BLD AUTO: 14.38 K/UL — HIGH (ref 1.8–7.4)
NEUTROPHILS NFR BLD AUTO: 83.2 % — HIGH (ref 43–77)
NITRITE UR-MCNC: NEGATIVE — SIGNIFICANT CHANGE UP
NRBC # BLD: 0 /100 WBCS — SIGNIFICANT CHANGE UP (ref 0–0)
PH UR: 6 — SIGNIFICANT CHANGE UP (ref 5–8)
PLATELET # BLD AUTO: 349 K/UL — SIGNIFICANT CHANGE UP (ref 150–400)
POTASSIUM SERPL-MCNC: 3.7 MMOL/L — SIGNIFICANT CHANGE UP (ref 3.5–5.3)
POTASSIUM SERPL-SCNC: 3.7 MMOL/L — SIGNIFICANT CHANGE UP (ref 3.5–5.3)
PROT SERPL-MCNC: 8.1 G/DL — SIGNIFICANT CHANGE UP (ref 6–8.3)
PROT UR-MCNC: 30 MG/DL
RBC # BLD: 5.57 M/UL — HIGH (ref 3.8–5.2)
RBC # FLD: 15.4 % — HIGH (ref 10.3–14.5)
RBC CASTS # UR COMP ASSIST: >50 /HPF (ref 0–4)
SARS-COV-2 RNA SPEC QL NAA+PROBE: SIGNIFICANT CHANGE UP
SODIUM SERPL-SCNC: 136 MMOL/L — SIGNIFICANT CHANGE UP (ref 135–145)
SP GR SPEC: 1.01 — SIGNIFICANT CHANGE UP (ref 1.01–1.02)
UROBILINOGEN FLD QL: NEGATIVE — SIGNIFICANT CHANGE UP
WBC # BLD: 17.28 K/UL — HIGH (ref 3.8–10.5)
WBC # FLD AUTO: 17.28 K/UL — HIGH (ref 3.8–10.5)
WBC UR QL: SIGNIFICANT CHANGE UP

## 2022-06-03 PROCEDURE — 85025 COMPLETE CBC W/AUTO DIFF WBC: CPT

## 2022-06-03 PROCEDURE — 80053 COMPREHEN METABOLIC PANEL: CPT

## 2022-06-03 PROCEDURE — 87086 URINE CULTURE/COLONY COUNT: CPT

## 2022-06-03 PROCEDURE — 71046 X-RAY EXAM CHEST 2 VIEWS: CPT

## 2022-06-03 PROCEDURE — 96374 THER/PROPH/DIAG INJ IV PUSH: CPT

## 2022-06-03 PROCEDURE — 76830 TRANSVAGINAL US NON-OB: CPT

## 2022-06-03 PROCEDURE — 81001 URINALYSIS AUTO W/SCOPE: CPT

## 2022-06-03 PROCEDURE — 76830 TRANSVAGINAL US NON-OB: CPT | Mod: 26

## 2022-06-03 PROCEDURE — 99285 EMERGENCY DEPT VISIT HI MDM: CPT

## 2022-06-03 PROCEDURE — 71046 X-RAY EXAM CHEST 2 VIEWS: CPT | Mod: 26

## 2022-06-03 PROCEDURE — 99285 EMERGENCY DEPT VISIT HI MDM: CPT | Mod: 25

## 2022-06-03 PROCEDURE — U0005: CPT

## 2022-06-03 PROCEDURE — 36415 COLL VENOUS BLD VENIPUNCTURE: CPT

## 2022-06-03 PROCEDURE — U0003: CPT

## 2022-06-03 PROCEDURE — 74176 CT ABD & PELVIS W/O CONTRAST: CPT | Mod: MA

## 2022-06-03 PROCEDURE — 74176 CT ABD & PELVIS W/O CONTRAST: CPT | Mod: 26,MA

## 2022-06-03 PROCEDURE — 83690 ASSAY OF LIPASE: CPT

## 2022-06-03 PROCEDURE — 84702 CHORIONIC GONADOTROPIN TEST: CPT

## 2022-06-03 RX ORDER — CEFPODOXIME PROXETIL 100 MG
1 TABLET ORAL
Qty: 20 | Refills: 0
Start: 2022-06-03 | End: 2022-06-12

## 2022-06-03 RX ORDER — METRONIDAZOLE 500 MG
500 TABLET ORAL ONCE
Refills: 0 | Status: COMPLETED | OUTPATIENT
Start: 2022-06-03 | End: 2022-06-03

## 2022-06-03 RX ORDER — ONDANSETRON 8 MG/1
1 TABLET, FILM COATED ORAL
Qty: 30 | Refills: 0
Start: 2022-06-03

## 2022-06-03 RX ORDER — SODIUM CHLORIDE 9 MG/ML
1000 INJECTION INTRAMUSCULAR; INTRAVENOUS; SUBCUTANEOUS ONCE
Refills: 0 | Status: COMPLETED | OUTPATIENT
Start: 2022-06-03 | End: 2022-06-03

## 2022-06-03 RX ORDER — AMPICILLIN SODIUM AND SULBACTAM SODIUM 250; 125 MG/ML; MG/ML
3 INJECTION, POWDER, FOR SUSPENSION INTRAMUSCULAR; INTRAVENOUS ONCE
Refills: 0 | Status: COMPLETED | OUTPATIENT
Start: 2022-06-03 | End: 2022-06-03

## 2022-06-03 RX ORDER — METRONIDAZOLE 500 MG
1 TABLET ORAL
Qty: 30 | Refills: 0
Start: 2022-06-03 | End: 2022-06-12

## 2022-06-03 RX ADMIN — Medication 100 MILLIGRAM(S): at 15:40

## 2022-06-03 RX ADMIN — AMPICILLIN SODIUM AND SULBACTAM SODIUM 200 GRAM(S): 250; 125 INJECTION, POWDER, FOR SUSPENSION INTRAMUSCULAR; INTRAVENOUS at 15:03

## 2022-06-03 RX ADMIN — SODIUM CHLORIDE 1000 MILLILITER(S): 9 INJECTION INTRAMUSCULAR; INTRAVENOUS; SUBCUTANEOUS at 10:12

## 2022-06-03 NOTE — ED PROVIDER NOTE - OBJECTIVE STATEMENT
Pt is a 53 yo f who has hx of ovarian cysts, anemia, sleep apnea diabetes sp  on fe infusions and followed by hematology follows with gyn DR. Susan Gregory for abnl uterine bleeding pmd dr Venancio Lagos, allergic to Iodine-iv contrast study caused rash, presents to er this am with mid lower pelvic/abd pain for 2 days associated initially with heavy now light vaginal bleeding and cramping. She underwent an endometrial bx in  by gyn and is reported to have been normal.  she presents today for eval of the pain, declines pain meds  no fever no chills no cp no sob no radiation no urine sx

## 2022-06-03 NOTE — ED ADULT NURSE REASSESSMENT NOTE - NS ED NURSE REASSESS COMMENT FT1
1500:  covering rn.   spoke to MD in regards to blood cultures prior to abx infusion.  Per MD, not necessary.  barry temple.

## 2022-06-03 NOTE — ED PROVIDER NOTE - CONSTITUTIONAL, MLM
normal... Well appearing, awake, alert, oriented to person, place, time/situation and in no apparent distress large bmi not toxic

## 2022-06-03 NOTE — ED PROVIDER NOTE - CARE PROVIDER_API CALL
Milan Alejandre ()  Internal Medicine  [address] [place] [address]  Phone: [phone]  Fax: [phone]  Follow Up Time:

## 2022-06-03 NOTE — CONSULT NOTE ADULT - ASSESSMENT
Diverticulitis  Proctocolitis    CT noted, d/w pt  Pt now clinically improved  PO Vantin 200 mg BID for 10 day course  Low fiber diet  Outpatient f/u for colonoscopy in 6-8 weeks  Card given for follow up  No GI objection to d/c plan    I reviewed the overnight course of events on the unit, re-confirming the patient history. I discussed the care with the patient and their family  Differential diagnosis and plan of care discussed with patient after the evaluation  35 minutes spent on total encounter of which more than fifty percent of the encounter was spent counseling and/or coordinating care by the attending physician.  Advanced care planning was discussed with patient and family.  Advanced care planning forms were reviewed and discussed.  Risks, benefits and alternatives of gastroenterologic procedures were discussed in detail and all questions were answered.

## 2022-06-03 NOTE — ED PROVIDER NOTE - GASTROINTESTINAL, MLM
Abdomen soft, with no rebound no guard but has mild low mid suprapubic/uterine tender infraumbilical. no monty no cvat no rash

## 2022-06-03 NOTE — ED PROVIDER NOTE - PROGRESS NOTE DETAILS
pt re evaluated comfortable labs us xr reviewed at bedside copies provided aaware of need for ct pt and  aware of ct findings  will start first dose of iv abx and consult with gi  dr kamala wilder aron flannery on call- ok trial dc with vantin and flagyl for 10 days  JACKELYN PA at bedside.

## 2022-06-03 NOTE — ED PROVIDER NOTE - ENMT, MLM
Airway patent, Nasal mucosa clear. Mouth with normal mucosa. Throat has no vesicles, no oropharyngeal exudates and uvula is midline. nog f  r

## 2022-06-03 NOTE — CONSULT NOTE ADULT - SUBJECTIVE AND OBJECTIVE BOX
[facility]  Matt Fontenot PA-C  [address] [place] [address]  [phone]      Chief Complaint:  Patient is a 52y old  Female who presents with a chief complaint of     HPI:  51 yo f who has hx of ovarian cysts, anemia, sleep apnea diabetes sp  on fe infusions and followed by hematology follows with gyn DR. Susan Gregory for abnl uterine bleeding pmd dr Venancio Lagos, allergic to Iodine-iv contrast study caused rash, presents to er this am with mid lower pelvic/abd pain for 2 days associated initially with heavy now light vaginal bleeding and cramping. She underwent an endometrial bx in  by gyn and is reported to have been normal.  	she presents today for eval of the pain, declines pain meds  no fever no chills no cp no sob no radiation no urine sx    INTERVAL HPI:  Pt s/e in ER  She reports history as above  Reports abdominal pain now improving and no further GI complaints    Allergies:  IV Contrast (Hives)  latex (Rash)  shellfish (Hives)      Medications:  metroNIDAZOLE  IVPB 500 milliGRAM(s) IV Intermittent once      PMHX/PSHX:  No Past Medical History    Basal cell cancer    Gestational diabetes    Diabetes    Ovarian cyst    Anemia    S/P  section    S/P anal fissurectomy    H/O umbilical hernia repair        Family history:  No pertinent family history in first degree relatives    Family history of diabetes mellitus (DM)    FH: HTN (hypertension)    FH: melanoma      ROS:     General:  No wt loss, fevers, chills, night sweats, fatigue,   Eyes:  Good vision, no reported pain  ENT:  No sore throat, pain, runny nose, dysphagia  CV:  No pain, palpitations, hypo/hypertension  Resp:  No dyspnea, cough, tachypnea, wheezing  GI:  +pain, No nausea, No vomiting, No diarrhea, No constipation, No weight loss, No fever, No pruritis, No rectal bleeding, No tarry stools, No dysphagia,  :  No pain, bleeding, incontinence, nocturia  Muscle:  No pain, weakness  Neuro:  No weakness, tingling, memory problems  Psych:  No fatigue, insomnia, mood problems, depression  Endocrine:  No polyuria, polydipsia, cold/heat intolerance  Heme:  No petechiae, ecchymosis, easy bruisability  Skin:  No rash, tattoos, scars, edema      PHYSICAL EXAM:   Vital Signs:  Vital Signs Last 24 Hrs  T(C): 37.4 (2022 09:28), Max: 37.4 (2022 09:28)  T(F): 99.3 (2022 09:28), Max: 99.3 (2022 09:28)  HR: 106 (2022 09:28) (106 - 106)  BP: 137/75 (2022 09:28) (137/75 - 137/75)  BP(mean): --  RR: 16 (:28) (16 - 16)  SpO2: 98% (2022 09:28) (98% - 98%)  Daily Height in cm: 165.1 (2022 09:28)    Daily     GENERAL:  Appears stated age  ABDOMEN:  Soft, non-tender, non-distended  NEURO:  Alert    LABS:                        15.0   17.28 )-----------( 349      ( 2022 10:13 )             46.9     06-03    136  |  102  |  9   ----------------------------<  172<H>  3.7   |  26  |  0.65    Ca    9.2      2022 10:13    TPro  8.1  /  Alb  3.1<L>  /  TBili  0.6  /  DBili  x   /  AST  11<L>  /  ALT  22  /  AlkPhos  99  06-03    LIVER FUNCTIONS - ( 2022 10:13 )  Alb: 3.1 g/dL / Pro: 8.1 g/dL / ALK PHOS: 99 U/L / ALT: 22 U/L / AST: 11 U/L / GGT: x             Urinalysis Basic - ( 2022 13:42 )    Color: Yellow / Appearance: Slightly Turbid / S.015 / pH: x  Gluc: x / Ketone: Large  / Bili: Negative / Urobili: Negative   Blood: x / Protein: 30 mg/dL / Nitrite: Negative   Leuk Esterase: Trace / RBC: >50 /HPF / WBC 0-2   Sq Epi: x / Non Sq Epi: Moderate / Bacteria: Occasional      Lipase [id number]         Imaging:  < from: CT Abdomen and Pelvis No Cont (22 @ 13:36) >    ACC: [id number] EXAM:  CT ABDOMEN AND PELVIS                          PROCEDURE DATE:  2022          INTERPRETATION:  CLINICAL INFORMATION: Mid lower abdominal pain.   Leukocytosis. Evaluate for infectious process.    COMPARISON: Same day pelvic ultrasound. CT abdomen pelvis 2019    CONTRAST/COMPLICATIONS:  IV Contrast: NONE  Oral Contrast: NONE  Complications: None reported at time of study completion    PROCEDURE:  CT of the Abdomen and Pelvis was performed.  Sagittal and coronal reformats were performed.    FINDINGS:    LOWER CHEST: No visualized pleural effusion.    Solid organ evaluation limited due to noncontrast technique.    LIVER: Diffuse hepatic steatosis and hepatic enlargement, 19 cm in   craniocaudal dimension.  BILE DUCTS: No biliary distention  GALLBLADDER: Unremarkable  SPLEEN: Normal size  PANCREAS: No acute peripancreatic inflammation  ADRENALS: Unremarkable  KIDNEYS/URETERS: No hydronephrosis    BLADDER: Underdistended  REPRODUCTIVE ORGANS: Small probable uterine myomas and coarse   calcification at the fundus. Uterus and adnexa otherwise suboptimally   characterized on noncontrast CT.    BOWEL: Limited evaluation the absence of oral contrast. Stomach is   underdistended. No small bowel distention. Normalappendix. Mild stool   burden of the colon limits evaluation of the colonic mucosa. Colonic   diverticulosis with focal short segment inflammation of the mid sigmoid   colon concerning for diverticulitis. Long segment wall thickening and   wall thickening of the sigmoid colon and rectum concerning for   superimposed proctocolitis.  PERITONEUM: Small ascites. No loculated fluid collection or free air.  VESSELS: No aneurysm of the abdominal aorta. Atheromatous changes.  RETROPERITONEUM/LYMPH NODES:Prominent pelvic node of the left lower   quadrant may be reactive, measuring 8 mm, image 82 series 2. Additional   small volume nodes of the abdomen/pelvis.  ABDOMINAL WALL: Postsurgical changes.  BONES: Degenerative changes including sacroiliac joints.    IMPRESSION:    Limited noncontrast study.    Acute sigmoid diverticulitis. Long segment inflammation and wall   thickening of the sigmoid colon and rectum may reflect superimposed   proctocolitis. Marked inflammation in the pelvis. No loculated collection   or free air. Follow to resolution. Consider colonoscopy once acute   symptoms have resolved.    Diffuse hepatic steatosis and hepatic enlargement.    --- End of Report ---            RIA SIMONS M.D., ATTENDING RADIOLOGIST  This document has been electronically signed. Demetri  3 2022  2:09PM    < end of copied text >

## 2022-06-03 NOTE — ED ADULT NURSE NOTE - OBJECTIVE STATEMENT
pt ambulatory from triage a&ox4 with complaints of lower abdominal pain x2 days. reports vaginal spotting bright red. hx of ovarian cyst. recent negative endometrial biopsy. reports increased urination.. +nausea without vomiting. reports slight rectal pressure and 4 loose BM today. abdomen soft/nontender.

## 2022-06-03 NOTE — ED PROVIDER NOTE - PATIENT PORTAL LINK FT
You can access the FollowMyHealth Patient Portal offered by [facility] by registering at the following website: [email or web address]. By joining [facility]’s FollowMyHealth portal, you will also be able to view your health information using other applications (apps) compatible with our system.

## 2022-06-03 NOTE — ED PROVIDER NOTE - CLINICAL SUMMARY MEDICAL DECISION MAKING FREE TEXT BOX
52 y f who has hx of abnormal uterine bleeding dm and is now here with mid lower abd pain pelvic pain and bleeding. no fever no chills no cp no sob no changes in urine or stools eval reveals mild low mid tender  ro uti ro uterine path- labs us  if labs abnl and us not contrib will obtain ct

## 2022-06-03 NOTE — ED PROVIDER NOTE - NSFOLLOWUPINSTRUCTIONS_ED_ALL_ED_FT
Odansetron for nausea  Vantin 1 pill twice daily for 10 days  flagyl 1 pill three times a day for 10 days  clear liquid diet until pain free then slowly advance to   Bananas Rice Tea and Toast (with margarine or jam)  then advance to Baked and boiled (eggs, pasta, chicken)  once tolerated you may advance slowly to regular  Eat small volumes   NO fatty fried foods, no milk or mild products, no tomato, spice, mint, tobacco, alcohol, caffeine  Stay well hydrated: a teaspoon at a time until able to tolerate normal intake.  Prompt follow up with your doctor is essential  return for worsening symptoms or any problems/concerns  FOLLOW UP WITH YOUR DOCTOR AND THE GI SPECIALIST DR COLE    Diverticulitis       Diverticulitis is when small pouches in your colon (large intestine) get infected or swollen. This causes pain in the belly (abdomen) and watery poop (diarrhea).    These pouches are called diverticula. The pouches form in people who have a condition called diverticulosis.      What are the causes?    This condition may be caused by poop (stool) that gets trapped in the pouches in your colon. The poop lets germs (bacteria) grow in the pouches. This causes the infection.      What increases the risk?    You are more likely to get this condition if you have small pouches in your colon. The risk is higher if:  •You are overweight or very overweight (obese).      •You do not exercise enough.      •You drink alcohol.      •You smoke or use products with tobacco in them.      •You eat a diet that has a lot of red meat such as beef, pork, or lamb.      •You eat a diet that does not have enough fiber in it.      •You are older than 40 years of age.        What are the signs or symptoms?    •Pain in the belly. Pain is often on the left side, but it may be in other areas.      •Fever and feeling cold.      •Feeling like you may vomit.      •Vomiting.      •Having cramps.      •Feeling full.      •Changes to how often you poop.      •Blood in your poop.        How is this treated?    Most cases are treated at home by:  •Taking over-the-counter pain medicines.      •Following a clear liquid diet.      •Taking antibiotic medicines.      •Resting.      Very bad cases may need to be treated at a hospital. This may include:  •Not eating or drinking.      •Taking prescription pain medicine.      •Getting antibiotic medicines through an IV tube.      •Getting fluid and food through an IV tube.      •Having surgery.      When you are feeling better, your doctor may tell you to have a test to check your colon (colonoscopy).      Follow these instructions at home:    Medicines   •Take over-the-counter and prescription medicines only as told by your doctor. These include:  •Antibiotics.      •Pain medicines.      •Fiber pills.      •Probiotics.      •Stool softeners.        •If you were prescribed an antibiotic medicine, take it as told by your doctor. Do not stop taking the antibiotic even if you start to feel better.      •Ask your doctor if the medicine prescribed to you requires you to avoid driving or using machinery.        Eating and drinking      •Follow a diet as told by your doctor.    •When you feel better, your doctor may tell you to change your diet. You may need to eat a lot of fiber. Fiber makes it easier to poop (have a bowel movement). Foods with fiber include:  •Berries.      •Beans.      •Lentils.      •Green vegetables.        •Avoid eating red meat.      General instructions     • Do not use any products that contain nicotine or tobacco, such as cigarettes, e-cigarettes, and chewing tobacco. If you need help quitting, ask your doctor.      •Exercise 3 or more times a week. Try to get 30 minutes each time. Exercise enough to sweat and make your heart beat faster.      •Keep all follow-up visits as told by your doctor. This is important.        Contact a doctor if:    •Your pain does not get better.      •You are not pooping like normal.        Get help right away if:    •Your pain gets worse.      •Your symptoms do not get better.      •Your symptoms get worse very fast.      •You have a fever.      •You vomit more than one time.    •You have poop that is:  •Bloody.      •Black.      •Tarry.          Summary    •This condition happens when small pouches in your colon get infected or swollen.      •Take medicines only as told by your doctor.      •Follow a diet as told by your doctor.      •Keep all follow-up visits as told by your doctor. This is important.      This information is not intended to replace advice given to you by your health care provider. Make sure you discuss any questions you have with your health care provider.

## 2022-06-04 LAB
CULTURE RESULTS: SIGNIFICANT CHANGE UP
SPECIMEN SOURCE: SIGNIFICANT CHANGE UP

## 2022-06-06 ENCOUNTER — RX RENEWAL (OUTPATIENT)
Age: 52
End: 2022-06-06

## 2022-06-27 ENCOUNTER — OUTPATIENT (OUTPATIENT)
Dept: OUTPATIENT SERVICES | Facility: HOSPITAL | Age: 52
LOS: 1 days | Discharge: HOME | End: 2022-06-27

## 2022-06-27 DIAGNOSIS — Z98.890 OTHER SPECIFIED POSTPROCEDURAL STATES: Chronic | ICD-10-CM

## 2022-06-27 DIAGNOSIS — D72.829 ELEVATED WHITE BLOOD CELL COUNT, UNSPECIFIED: ICD-10-CM

## 2022-07-02 ENCOUNTER — NON-APPOINTMENT (OUTPATIENT)
Age: 52
End: 2022-07-02

## 2022-07-06 ENCOUNTER — APPOINTMENT (OUTPATIENT)
Dept: HEMATOLOGY ONCOLOGY | Facility: CLINIC | Age: 52
End: 2022-07-06

## 2022-07-19 ENCOUNTER — RX RENEWAL (OUTPATIENT)
Age: 52
End: 2022-07-19

## 2022-07-22 LAB
BASOPHILS # BLD AUTO: 0.03 K/UL
BASOPHILS NFR BLD AUTO: 0.3 %
EOSINOPHIL # BLD AUTO: 0.09 K/UL
EOSINOPHIL NFR BLD AUTO: 0.8 %
FERRITIN SERPL-MCNC: 50 NG/ML
HCT VFR BLD CALC: 47.2 %
HGB BLD-MCNC: 15.1 G/DL
IMM GRANULOCYTES NFR BLD AUTO: 0.3 %
LYMPHOCYTES # BLD AUTO: 2.38 K/UL
LYMPHOCYTES NFR BLD AUTO: 22.2 %
MAN DIFF?: NORMAL
MCHC RBC-ENTMCNC: 28 PG
MCHC RBC-ENTMCNC: 32 GM/DL
MCV RBC AUTO: 87.4 FL
MONOCYTES # BLD AUTO: 0.72 K/UL
MONOCYTES NFR BLD AUTO: 6.7 %
NEUTROPHILS # BLD AUTO: 7.48 K/UL
NEUTROPHILS NFR BLD AUTO: 69.7 %
PLATELET # BLD AUTO: 395 K/UL
RBC # BLD: 5.4 M/UL
RBC # FLD: 15.2 %
WBC # FLD AUTO: 10.73 K/UL

## 2022-07-23 LAB
IRON SATN MFR SERPL: 9 %
IRON SERPL-MCNC: 34 UG/DL
TIBC SERPL-MCNC: 400 UG/DL
UIBC SERPL-MCNC: 366 UG/DL

## 2022-07-26 ENCOUNTER — APPOINTMENT (OUTPATIENT)
Dept: HEMATOLOGY ONCOLOGY | Facility: CLINIC | Age: 52
End: 2022-07-26

## 2022-07-26 PROCEDURE — 99213 OFFICE O/P EST LOW 20 MIN: CPT | Mod: 95

## 2022-07-26 NOTE — HISTORY OF PRESENT ILLNESS
[de-identified] : 7/2021-Patient presented at the request of her PCP for leukocytosis, found on routine lab work by PCP-goes for lab work Q 3 months for Hemoglobin A1C. Also noted intermittent elevation of RBC. Erythropoietin level elevated at 43. CASE 2 mutation studies negative. BCR/ABL not detected. Has a h/o sleep apnea.\par \par \par \par \par  [de-identified] : S/P hospitalization for diverticulitis last month-saw Dr. Link (GI)-colonoscopy scheduled 3rd week August.\kaylen Had held PO iron she was taking BID, x 2 weeks, though has since resumed it.\kaylen Has sleep apnea-does not use CPAP machine-had difficulty using it.\par \par No current c/o CP, SOB, N/V/D, change in bowel/bladder habits. No fevers, abnormal bruising. No c/o H/A, pruritis.\par \kaylen Has had COVID vaccines (Pfizer). No booster.

## 2022-07-26 NOTE — REASON FOR VISIT
[Home] : at home, [unfilled] , at the time of the visit. [Medical Office: ([facility])___] : at the medical office located in  [Patient] : the patient [Self] : self [Follow-Up Visit] : a follow-up visit for [FreeTextEntry2] : leukocytosis

## 2022-07-26 NOTE — OB HISTORY
[Menarche Age: ____] : age at menarche was [unfilled] [___] : Living: [unfilled] [Definite:  ___ (Date)] : the last menstrual period was [unfilled] [Currently In Menopause] : currently in menopause

## 2022-07-26 NOTE — PHYSICAL EXAM
[Normal] : affect appropriate [de-identified] : non-toxic appearing [de-identified] : breathing appeared unlabored [de-identified] : coloration appeared normal

## 2022-07-27 ENCOUNTER — RX CHANGE (OUTPATIENT)
Age: 52
End: 2022-07-27

## 2022-08-28 ENCOUNTER — RX RENEWAL (OUTPATIENT)
Age: 52
End: 2022-08-28

## 2022-09-13 ENCOUNTER — APPOINTMENT (OUTPATIENT)
Dept: ENDOCRINOLOGY | Facility: CLINIC | Age: 52
End: 2022-09-13

## 2022-09-13 ENCOUNTER — RESULT CHARGE (OUTPATIENT)
Age: 52
End: 2022-09-13

## 2022-09-13 ENCOUNTER — APPOINTMENT (OUTPATIENT)
Dept: MAMMOGRAPHY | Facility: CLINIC | Age: 52
End: 2022-09-13

## 2022-09-13 ENCOUNTER — APPOINTMENT (OUTPATIENT)
Dept: INTERNAL MEDICINE | Facility: CLINIC | Age: 52
End: 2022-09-13

## 2022-09-13 ENCOUNTER — MED ADMIN CHARGE (OUTPATIENT)
Age: 52
End: 2022-09-13

## 2022-09-13 ENCOUNTER — APPOINTMENT (OUTPATIENT)
Dept: ULTRASOUND IMAGING | Facility: CLINIC | Age: 52
End: 2022-09-13

## 2022-09-13 VITALS
SYSTOLIC BLOOD PRESSURE: 112 MMHG | BODY MASS INDEX: 39.86 KG/M2 | HEART RATE: 102 BPM | DIASTOLIC BLOOD PRESSURE: 72 MMHG | RESPIRATION RATE: 16 BRPM | OXYGEN SATURATION: 98 % | WEIGHT: 248 LBS | TEMPERATURE: 98.4 F | HEIGHT: 66 IN

## 2022-09-13 VITALS
WEIGHT: 243 LBS | SYSTOLIC BLOOD PRESSURE: 108 MMHG | OXYGEN SATURATION: 96 % | BODY MASS INDEX: 39.22 KG/M2 | HEART RATE: 88 BPM | DIASTOLIC BLOOD PRESSURE: 66 MMHG

## 2022-09-13 DIAGNOSIS — R92.8 OTHER ABNORMAL AND INCONCLUSIVE FINDINGS ON DIAGNOSTIC IMAGING OF BREAST: ICD-10-CM

## 2022-09-13 PROCEDURE — 99205 OFFICE O/P NEW HI 60 MIN: CPT | Mod: 25

## 2022-09-13 PROCEDURE — 90686 IIV4 VACC NO PRSV 0.5 ML IM: CPT

## 2022-09-13 PROCEDURE — 82962 GLUCOSE BLOOD TEST: CPT

## 2022-09-13 PROCEDURE — G0008: CPT

## 2022-09-13 PROCEDURE — 36415 COLL VENOUS BLD VENIPUNCTURE: CPT

## 2022-09-13 PROCEDURE — 83036 HEMOGLOBIN GLYCOSYLATED A1C: CPT | Mod: QW

## 2022-09-13 PROCEDURE — 99214 OFFICE O/P EST MOD 30 MIN: CPT | Mod: 25

## 2022-09-13 RX ORDER — EZETIMIBE 10 MG/1
10 TABLET ORAL
Qty: 90 | Refills: 0 | Status: DISCONTINUED | COMMUNITY
Start: 2020-07-07 | End: 2022-09-13

## 2022-09-13 RX ORDER — ROSUVASTATIN CALCIUM 40 MG/1
40 TABLET, FILM COATED ORAL
Qty: 90 | Refills: 0 | Status: DISCONTINUED | COMMUNITY
Start: 2019-10-31 | End: 2022-09-13

## 2022-09-13 NOTE — PHYSICAL EXAM
[Alert] : alert [No Acute Distress] : no acute distress [Well Developed] : well developed [Normal Voice/Communication] : normal voice communication [No Rash] : no rash [Right foot was examined, including] : right foot ~C was examined, including visual inspection with sensory and pulse exams [Left foot was examined, including] : left foot ~C was examined, including visual inspection with sensory and pulse exams [Normal] : normal [Full ROM] : with full range of motion [Diminished Throughout Both Feet] : normal tactile sensation with monofilament testing throughout both feet [Oriented x3] : oriented to person, place, and time

## 2022-09-13 NOTE — ASSESSMENT
[FreeTextEntry1] : 52 year old female with past medical history of Diabetes Mellitus Type 2  who presents for management of her diabetes\par \par 1. DM 2- uncontrolled, uncomplicated\par Patient counseled on the importance of diabetic control and risk of complications. We discussed about microvascular disease and macrovascular disease. We discussed the importance of opthalmology evaluations annually or more frequent as necessary. We also discussed the importance of diabetes foot care. Some form of glucose monitoring is always advised. Maintaining a low carbohydrate/ADA diet and physical activity was discussed. Patient's diet and the necessary changes discussed. Reviewed over freestyle susu and use. Reviewed over glycemic goals. There is a discrepancy between hga1c and fingersticks.\par \par Cont Xultophy 30 units QHS\par Cont Farxiga 10 mg QD\par Will place patient on Susu\par Cont ADA diet\par Cont exercise\par Opthalmology Visit needs follow up\par Foot Exam up to date\par \par \par

## 2022-09-13 NOTE — CONSULT LETTER
[Dear  ___] : Dear ~LINCOLN, [Consult Letter:] : I had the pleasure of evaluating your patient, [unfilled]. [Please see my note below.] : Please see my note below. [Consult Closing:] : Thank you very much for allowing me to participate in the care of this patient.  If you have any questions, please do not hesitate to contact me. [Sincerely,] : Sincerely, [FreeTextEntry3] : Giovana Guan MS. DO.\par Endocrinology, Diabetes and Metabolism\par [address] [place] [address]\[place] [address]\par Tel [phone]\par Fax [phone]\par

## 2022-09-13 NOTE — HISTORY OF PRESENT ILLNESS
[FreeTextEntry1] : Ms. ANUM VELAZQUEZ  is a 52 year old female with past medical history of Diabetes Mellitus Type 2  who presents for management of her diabetes. She  has had difficulty in controlling her  blood sugars. Patient denies any history of diabetic retinopathy, nephropathy or neuropathy. She denies any blurry vision, polyuria, polydipsia and numbness/tingling in the extremities.\par \par \par POCT Glucose: 162 mg/dL\par POCT Hga1c:7.3 %\par \par \par Diabetes first diagnosed:2010\par Type: 2\par \par Current diabetic regimen:\par Metformin (diarrhea)\par Xultophy 30 units QHS\par Farxiga 10 mg QD\par Other relevant medications:\par Ramipril\par \par Self monitoring blood glucose : 2x times per day:\par \par SMBG:\par Pre-breakfast: 110-140\par bedtime:150-160\par \par Hypoglycemia: none\par \par Diet:\par Eats low carb\par \par Exercise:walking\par \par Urine micro:NA\par lipid profile: LDL 83 (10/2021)\par last hba1c:7.3% (9/2022)\par eye exam:2020\par diabetic foot exam/podiatry:NA\par \par \par \par \par

## 2022-09-13 NOTE — PAST MEDICAL HISTORY
[Menstruating] : hx of menstruating [Definite ___ (Date)] : the last menstrual period was [unfilled] [Approximately ___ (Month)] : the LMP was approximately [unfilled] month(s) ago [Irregular Cycle Intervals] : are  irregular

## 2022-09-14 ENCOUNTER — APPOINTMENT (OUTPATIENT)
Dept: ENDOCRINOLOGY | Facility: CLINIC | Age: 52
End: 2022-09-14

## 2022-09-14 PROBLEM — R92.8 ABNORMAL FINDING ON BREAST IMAGING: Status: ACTIVE | Noted: 2021-07-06

## 2022-09-14 NOTE — HISTORY OF PRESENT ILLNESS
[FreeTextEntry1] : Fasting labs And a flu shot. [de-identified] : Mrs. VELAZQUEZ is a 52 year  female, who present to the office for fasting labs.    Patient states she has been seeing an endocrinologist for the management of her diabetes.    Denies any new medication  changes.    Patient denies having any hypo or hyperglycemic events.\par Patient also requesting to have a yearly flu shot.\par Also Schedule for colonoscopy on Thursday Dr. Link

## 2022-09-14 NOTE — PLAN
[FreeTextEntry1] :   Gynecology   history of abnormal mammogram.     Advised patient to follow-up with breast surgeon.  Repeat mammogram and breast sonogram.  \par \par Heme - Leukocytosis - follow up with heme -    Prescription given to repeat CBC.\par Iron deficiency I  Monitor iron levels.\par \par - Hematuria - Flank pain - check urine culture\par \par Diabetes - continue Xultophy 21 -unit subcutaneous injections q.d. as directed and Farxiga 10mg p.o.q.d; continue Rosuvastatin Calcium 40mg p.o.q.d;   ASA daily  \par Advised yearly retinal eye exam.\par Continue with Crestor and added Ramipril today - discussed renal protection - Reviewed side effects to medications \par Prescription given to check an A1c.  As well as a comprehensive metabolic urine analysis with microalbumin.\par Flu vaccination was updated today.\par \par Gastroenterology-    Follow-up with Dr. Link on Thursday.  Request consult report.\par Cardiology\par hyperlipidemia - continue Rosuvastatin Calcium 40 mg p.o.q.d. and Ezetimibe 10 mg p.o.q.d. - continue low cholesterol/low fat diet. Check   Fasting lipid panel, liver function test\par \par Cardio - Hypertension -  Patient was educated about hypertension and the importance of controlling the pressure through lifestyle modification which include low sodium  diet and  aerobic  exercise.  Also discussed the use of prescription medication which included their benefits and their side effects. We discussed the use of ASA 81 mg daily.   Having a BMI less than or equal to 25.  Continue with Altace 5 mg daily -\par \par Patient  education  - COVID-19   Counseling and education provided to the patient.  Advised sign and symptoms of the virus.  Advised contact precautions.  Educated patient on proper hand washing and to participate in social distancing. Advised to wait 3 weeks and get the covid booster shot for the omicron variant\par Patient is in full awareness of the plan and agrees to it.  All pt question was answered.  \par \par I spent 30  Minutes with the patient, half of which we discussed finding on physical exam  and coordinated care.  As well as reviewed my plans and follow ups. \par \par  Fluzone was administered 0.5 cc IM deltoid area.  See consent form for lot number and expiration date.  Advised patient side effects of the vaccination.

## 2022-09-14 NOTE — ASSESSMENT
[FreeTextEntry1] : Mrs. VELAZQUEZ is a 51 year  female, who present to the office  for fasting labs, flu shot, and general checkup.

## 2022-09-14 NOTE — HEALTH RISK ASSESSMENT
[Former] : Former [Yes] : Yes [2 - 3 times a week (3 pts)] : 2 - 3  times a week (3 points) [3 or 4 (1 pt)] : 3 or 4  (1 point) [Less than monthly (1 pt)] : Less than monthly (1 point) [No] : In the past 12 months have you used drugs other than those required for medical reasons? No [No falls in past year] : Patient reported no falls in the past year [1] : 1) Little interest or pleasure doing things for several days (1) [0] : 2) Feeling down, depressed, or hopeless: Not at all (0) [de-identified] : ENDO-  GI  [YearQuit] : 2003 [Audit-CScore] : 5 [[id number]] : 1

## 2022-09-14 NOTE — PHYSICAL EXAM
[Well Nourished] : well nourished [Well Developed] : well developed [Well-Appearing] : well-appearing [Normal Sclera/Conjunctiva] : normal sclera/conjunctiva [PERRL] : pupils equal round and reactive to light [EOMI] : extraocular movements intact [Normal Outer Ear/Nose] : the outer ears and nose were normal in appearance [Normal Oropharynx] : the oropharynx was normal [No JVD] : no jugular venous distention [No Lymphadenopathy] : no lymphadenopathy [Supple] : supple [Thyroid Normal, No Nodules] : the thyroid was normal and there were no nodules present [No Respiratory Distress] : no respiratory distress  [No Accessory Muscle Use] : no accessory muscle use [Clear to Auscultation] : lungs were clear to auscultation bilaterally [Normal Rate] : normal rate  [Regular Rhythm] : with a regular rhythm [Normal S1, S2] : normal S1 and S2 [No Carotid Bruits] : no carotid bruits [No Abdominal Bruit] : a ~M bruit was not heard ~T in the abdomen [No Varicosities] : no varicosities [Pedal Pulses Present] : the pedal pulses are present [No Edema] : there was no peripheral edema [No Palpable Aorta] : no palpable aorta [No Extremity Clubbing/Cyanosis] : no extremity clubbing/cyanosis [Soft] : abdomen soft [Non Tender] : non-tender [Non-distended] : non-distended [No Masses] : no abdominal mass palpated [No HSM] : no HSM [Normal Bowel Sounds] : normal bowel sounds [Normal Posterior Cervical Nodes] : no posterior cervical lymphadenopathy [Normal Anterior Cervical Nodes] : no anterior cervical lymphadenopathy [No CVA Tenderness] : no CVA  tenderness [No Spinal Tenderness] : no spinal tenderness [No Joint Swelling] : no joint swelling [Grossly Normal Strength/Tone] : grossly normal strength/tone [No Rash] : no rash [Both Ears Pierced] : both ears [Tattoo - Single] : a single tattoo was observed [Coordination Grossly Intact] : coordination grossly intact [No Focal Deficits] : no focal deficits [Normal Gait] : normal gait [Deep Tendon Reflexes (DTR)] : deep tendon reflexes were 2+ and symmetric [Speech Grossly Normal] : speech grossly normal [Normal Affect] : the affect was normal [Alert and Oriented x3] : oriented to person, place, and time [Normal Mood] : the mood was normal [Normal Insight/Judgement] : insight and judgment were intact [de-identified] : w/ murmur  [de-identified] : obese [de-identified] : right rib cage pain to palp   no bruising noted  no rashes noted  no CVa tenderness

## 2022-09-14 NOTE — REVIEW OF SYSTEMS
[Dysmenorrhea] : dysmenorrhea [Negative] : Musculoskeletal [Fatigue] : no fatigue [Recent Change In Weight] : ~T no recent weight change [Nasal Discharge] : no nasal discharge [Palpitations] : no palpitations [Lower Ext Edema] : no lower extremity edema [Shortness Of Breath] : no shortness of breath [Cough] : no cough [Dyspnea on Exertion] : no dyspnea on exertion [Abdominal Pain] : no abdominal pain [Constipation] : no constipation [Diarrhea] : diarrhea [Vomiting] : no vomiting [Melena] : no melena [Dysuria] : no dysuria [Muscle Pain] : no muscle pain [Itching] : no itching [Skin Rash] : no skin rash [Headache] : no headache [Memory Loss] : no memory loss [Swollen Glands] : no swollen glands [FreeTextEntry8] : heavy menses

## 2022-09-26 ENCOUNTER — RX RENEWAL (OUTPATIENT)
Age: 52
End: 2022-09-26

## 2022-10-25 ENCOUNTER — RX RENEWAL (OUTPATIENT)
Age: 52
End: 2022-10-25

## 2022-10-31 ENCOUNTER — RX RENEWAL (OUTPATIENT)
Age: 52
End: 2022-10-31

## 2022-11-02 ENCOUNTER — OUTPATIENT (OUTPATIENT)
Dept: OUTPATIENT SERVICES | Facility: HOSPITAL | Age: 52
LOS: 1 days | Discharge: HOME | End: 2022-11-02

## 2022-11-02 DIAGNOSIS — Z98.890 OTHER SPECIFIED POSTPROCEDURAL STATES: Chronic | ICD-10-CM

## 2022-11-02 DIAGNOSIS — D72.829 ELEVATED WHITE BLOOD CELL COUNT, UNSPECIFIED: ICD-10-CM

## 2022-11-16 ENCOUNTER — APPOINTMENT (OUTPATIENT)
Dept: INTERNAL MEDICINE | Facility: CLINIC | Age: 52
End: 2022-11-16

## 2022-11-16 VITALS
BODY MASS INDEX: 39.37 KG/M2 | OXYGEN SATURATION: 99 % | HEART RATE: 97 BPM | RESPIRATION RATE: 16 BRPM | SYSTOLIC BLOOD PRESSURE: 120 MMHG | HEIGHT: 66 IN | DIASTOLIC BLOOD PRESSURE: 64 MMHG | WEIGHT: 245 LBS | TEMPERATURE: 96.6 F

## 2022-11-16 DIAGNOSIS — Z11.59 ENCOUNTER FOR SCREENING FOR OTHER VIRAL DISEASES: ICD-10-CM

## 2022-11-16 PROCEDURE — 99213 OFFICE O/P EST LOW 20 MIN: CPT

## 2022-11-16 RX ORDER — CEFPODOXIME PROXETIL 200 MG/1
200 TABLET, FILM COATED ORAL
Qty: 20 | Refills: 0 | Status: DISCONTINUED | COMMUNITY
Start: 2022-06-03 | End: 2022-11-16

## 2022-11-16 RX ORDER — METRONIDAZOLE 500 MG/1
500 TABLET ORAL
Qty: 30 | Refills: 0 | Status: DISCONTINUED | COMMUNITY
Start: 2022-06-03 | End: 2022-11-16

## 2022-11-17 LAB
INFLUENZA A RESULT: NOT DETECTED
INFLUENZA B RESULT: NOT DETECTED
RESP SYN VIRUS RESULT: NOT DETECTED
SARS-COV-2 RESULT: DETECTED

## 2022-11-18 DIAGNOSIS — H10.9 UNSPECIFIED CONJUNCTIVITIS: ICD-10-CM

## 2022-11-18 NOTE — HEALTH RISK ASSESSMENT
[Former] : Former [Yes] : Yes [2 - 3 times a week (3 pts)] : 2 - 3  times a week (3 points) [3 or 4 (1 pt)] : 3 or 4  (1 point) [Less than monthly (1 pt)] : Less than monthly (1 point) [No] : In the past 12 months have you used drugs other than those required for medical reasons? No [No falls in past year] : Patient reported no falls in the past year [1] : 1) Little interest or pleasure doing things for several days (1) [0] : 2) Feeling down, depressed, or hopeless: Not at all (0) [de-identified] : ENDO-  GI  [YearQuit] : 2003 [Audit-CScore] : 5 [[id number]] : 1

## 2022-11-18 NOTE — ASSESSMENT
[FreeTextEntry1] : Mrs. VELAZQUEZ is a 52 year  female, who present to the office  for evaluation of cough and chest congestion.

## 2022-11-18 NOTE — COUNSELING
[Encouraged to increase physical activity] : Encouraged to increase physical activity [Yes] : Risk of tobacco use and health benefits of smoking cessation discussed: Yes [Good understanding] : Patient has a good understanding of disease, goals and obesity follow-up plan [FreeTextEntry2] : ADA diet 1600

## 2022-11-18 NOTE — REVIEW OF SYSTEMS
[Dysmenorrhea] : dysmenorrhea [Negative] : Heme/Lymph [Fever] : no fever [Chills] : no chills [Fatigue] : fatigue [Recent Change In Weight] : ~T no recent weight change [Nasal Discharge] : nasal discharge [Postnasal Drip] : postnasal drip [Palpitations] : no palpitations [Lower Ext Edema] : no lower extremity edema [Shortness Of Breath] : no shortness of breath [Cough] : cough [Dyspnea on Exertion] : no dyspnea on exertion [Abdominal Pain] : no abdominal pain [Constipation] : no constipation [Diarrhea] : diarrhea [Vomiting] : no vomiting [Melena] : no melena [Dysuria] : no dysuria [Muscle Pain] : no muscle pain [Itching] : no itching [Skin Rash] : no skin rash [Headache] : no headache [Memory Loss] : no memory loss [Swollen Glands] : no swollen glands [FreeTextEntry8] : heavy menses

## 2022-11-18 NOTE — PLAN
[FreeTextEntry1] :  I&D -   viral syndrome-  cough-   advised patient to increase fluid intake.  Check flu and COVID swab.  High concerns about COVID 1 9.   advised patient self quarantine pending COVID swab.\par   If cough is getting worse advised patient to give us a call.  If feeling shortness of breath dizziness lightheadedness advised patient to go to emergency room for evaluation.\par \par  Endocrinology Diabetes - continue Xultophy 21 -unit subcutaneous injections q.d. as directed and Farxiga 10mg p.o.q.d; continue Rosuvastatin Calcium 40mg p.o.q.d;   ASA daily  \par Advised yearly retinal eye exam.\par Continue with Crestor and added Ramipril today - discussed renal protection - Reviewed side effects to medications \par  follow-up with Dr. Guan \par \par Cardiology\par hyperlipidemia - continue Rosuvastatin Calcium 40 mg p.o.q.d. and Ezetimibe 10 mg p.o.q.d. - continue low cholesterol/low fat diet.\par \par I spent 24  Minutes with the patient, half of which we discussed finding on physical exam  and coordinated care.  As well as reviewed my plans and follow ups. \par \par

## 2022-11-18 NOTE — HISTORY OF PRESENT ILLNESS
[Cold Symptoms] : cold symptoms [Moderate] : moderate [___ Days ago] : [unfilled] days ago [Sudden] : suddenly [Congestion] : congestion [Cough] : cough [At Night] : at night [Improving] : improving [Sore Throat] : no sore throat [Wheezing] : no wheezing [Chills] : no chills [Anorexia] : no anorexia [Shortness Of Breath] : no shortness of breath [Earache] : no earache [Fatigue] : not fatigue [Headache] : no headache [Fever] : no fever [FreeTextEntry5] : nasonex  [FreeTextEntry8] : Woke up today and felt better.\par States family started to get sick after attending a wedding -  All have been checked out over the last few weeks.  So far all have tested Negative for the flu and covid.\par \par Denies feeling SOB, CP, Fever, or chills\par Has a past medical history of bronchitis worried about having a recurrence of it.\par \par States her blood sugars have been getting

## 2022-11-18 NOTE — PHYSICAL EXAM
[Well Nourished] : well nourished [Well Developed] : well developed [Well-Appearing] : well-appearing [Normal Sclera/Conjunctiva] : normal sclera/conjunctiva [PERRL] : pupils equal round and reactive to light [EOMI] : extraocular movements intact [Normal Outer Ear/Nose] : the outer ears and nose were normal in appearance [Normal Oropharynx] : the oropharynx was normal [No JVD] : no jugular venous distention [No Lymphadenopathy] : no lymphadenopathy [Supple] : supple [Thyroid Normal, No Nodules] : the thyroid was normal and there were no nodules present [No Respiratory Distress] : no respiratory distress  [No Accessory Muscle Use] : no accessory muscle use [Clear to Auscultation] : lungs were clear to auscultation bilaterally [Normal Rate] : normal rate  [Regular Rhythm] : with a regular rhythm [Normal S1, S2] : normal S1 and S2 [No Carotid Bruits] : no carotid bruits [No Abdominal Bruit] : a ~M bruit was not heard ~T in the abdomen [No Varicosities] : no varicosities [Pedal Pulses Present] : the pedal pulses are present [No Edema] : there was no peripheral edema [No Palpable Aorta] : no palpable aorta [No Extremity Clubbing/Cyanosis] : no extremity clubbing/cyanosis [Soft] : abdomen soft [Non Tender] : non-tender [Non-distended] : non-distended [No Masses] : no abdominal mass palpated [No HSM] : no HSM [Normal Bowel Sounds] : normal bowel sounds [Normal Posterior Cervical Nodes] : no posterior cervical lymphadenopathy [Normal Anterior Cervical Nodes] : no anterior cervical lymphadenopathy [No CVA Tenderness] : no CVA  tenderness [No Spinal Tenderness] : no spinal tenderness [No Joint Swelling] : no joint swelling [Grossly Normal Strength/Tone] : grossly normal strength/tone [No Rash] : no rash [Both Ears Pierced] : both ears [Tattoo - Single] : a single tattoo was observed [Coordination Grossly Intact] : coordination grossly intact [No Focal Deficits] : no focal deficits [Normal Gait] : normal gait [Deep Tendon Reflexes (DTR)] : deep tendon reflexes were 2+ and symmetric [Speech Grossly Normal] : speech grossly normal [Normal Affect] : the affect was normal [Alert and Oriented x3] : oriented to person, place, and time [Normal Mood] : the mood was normal [Normal Insight/Judgement] : insight and judgment were intact [de-identified] : w/ murmur  [de-identified] : obese [de-identified] : right rib cage pain to palp   no bruising noted  no rashes noted  no CVa tenderness

## 2022-11-21 ENCOUNTER — APPOINTMENT (OUTPATIENT)
Dept: ULTRASOUND IMAGING | Facility: CLINIC | Age: 52
End: 2022-11-21

## 2022-11-21 ENCOUNTER — APPOINTMENT (OUTPATIENT)
Dept: MAMMOGRAPHY | Facility: CLINIC | Age: 52
End: 2022-11-21

## 2022-11-28 ENCOUNTER — RX RENEWAL (OUTPATIENT)
Age: 52
End: 2022-11-28

## 2022-12-08 ENCOUNTER — NON-APPOINTMENT (OUTPATIENT)
Age: 52
End: 2022-12-08

## 2022-12-21 NOTE — PATIENT PROFILE ADULT - NSPROMEDSHERBAL_GEN_A_NUR
Cholesterol is ok, continue lipitor daily  Please schedule one month follow up appt for her HTN as discussed during the visit.
Patient notified     Patient will call back to schedule follow up
no

## 2022-12-26 ENCOUNTER — OUTPATIENT (OUTPATIENT)
Dept: OUTPATIENT SERVICES | Facility: HOSPITAL | Age: 52
LOS: 1 days | Discharge: HOME | End: 2022-12-26

## 2022-12-26 DIAGNOSIS — D72.829 ELEVATED WHITE BLOOD CELL COUNT, UNSPECIFIED: ICD-10-CM

## 2022-12-26 DIAGNOSIS — Z98.890 OTHER SPECIFIED POSTPROCEDURAL STATES: Chronic | ICD-10-CM

## 2022-12-28 ENCOUNTER — RX RENEWAL (OUTPATIENT)
Age: 52
End: 2022-12-28

## 2022-12-29 ENCOUNTER — RX RENEWAL (OUTPATIENT)
Age: 52
End: 2022-12-29

## 2022-12-30 ENCOUNTER — RX RENEWAL (OUTPATIENT)
Age: 52
End: 2022-12-30

## 2023-01-03 ENCOUNTER — APPOINTMENT (OUTPATIENT)
Dept: HEMATOLOGY ONCOLOGY | Facility: CLINIC | Age: 53
End: 2023-01-03
Payer: COMMERCIAL

## 2023-01-27 ENCOUNTER — RX RENEWAL (OUTPATIENT)
Age: 53
End: 2023-01-27

## 2023-02-07 NOTE — H&P PST ADULT - NSICDXPROBLEM_GEN_ALL_CORE_FT
Headache Monitoring: I recommended monitoring the headaches for now. There is no evidence of increased intracranial pressure. They were instructed to call if the headaches are worsening. PROBLEM DIAGNOSES  Problem: Ovarian cyst  Assessment and Plan: Pt is scheduled for laparoscopic right salpingo oophorectomy, dilation and curettage, possible exploratory laparotomy, hysterectomy on 10/17/19.   Verbal and written pre op instructions reviewed with patient and pt able to verbalize understanding.   Verbal and written instructions given with chlorhexidine wash, pt able to verbalize understanding via teach back method.   Pt met STOP BANG score for LAURO precaution. OR booking notified via fax.   OR booking notified of pt's DM status via fax.   Pt instructed to hold Farxiga on 10/15, 10/16 and 10/17.  Pt to obtain medical eval as requested by surgeon, will request document. Pt instructed to obtain preop Xultophy instructed from PCP, pt able to verbalize understanding.  OR booking notified of pt's shellfish, IV contrast, and latex allergy status via fax.

## 2023-02-28 ENCOUNTER — RX RENEWAL (OUTPATIENT)
Age: 53
End: 2023-02-28

## 2023-03-14 ENCOUNTER — APPOINTMENT (OUTPATIENT)
Dept: INTERNAL MEDICINE | Facility: CLINIC | Age: 53
End: 2023-03-14

## 2023-03-17 ENCOUNTER — OUTPATIENT (OUTPATIENT)
Dept: OUTPATIENT SERVICES | Facility: HOSPITAL | Age: 53
LOS: 1 days | Discharge: HOME | End: 2023-03-17

## 2023-03-17 DIAGNOSIS — D72.829 ELEVATED WHITE BLOOD CELL COUNT, UNSPECIFIED: ICD-10-CM

## 2023-03-17 DIAGNOSIS — Z98.890 OTHER SPECIFIED POSTPROCEDURAL STATES: Chronic | ICD-10-CM

## 2023-03-21 ENCOUNTER — APPOINTMENT (OUTPATIENT)
Dept: INTERNAL MEDICINE | Facility: CLINIC | Age: 53
End: 2023-03-21

## 2023-03-22 ENCOUNTER — RX RENEWAL (OUTPATIENT)
Age: 53
End: 2023-03-22

## 2023-03-29 ENCOUNTER — RX RENEWAL (OUTPATIENT)
Age: 53
End: 2023-03-29

## 2023-04-18 ENCOUNTER — APPOINTMENT (OUTPATIENT)
Dept: HEMATOLOGY ONCOLOGY | Facility: CLINIC | Age: 53
End: 2023-04-18
Payer: COMMERCIAL

## 2023-04-18 PROCEDURE — 99213 OFFICE O/P EST LOW 20 MIN: CPT | Mod: 95

## 2023-04-18 NOTE — REASON FOR VISIT
[Follow-Up Visit] : a follow-up visit for [Medical Office: ([facility])___] : at the medical office located in  [Home] : at home, [unfilled] , at the time of the visit. [Patient] : the patient [Self] : self [FreeTextEntry2] : leukocytosis

## 2023-04-18 NOTE — HISTORY OF PRESENT ILLNESS
[de-identified] : 7/2021-Patient presented at the request of her PCP for leukocytosis, found on routine lab work by PCP-goes for lab work Q 3 months for Hemoglobin A1C. Also noted intermittent elevation of RBC. Erythropoietin level elevated at 43. CASE 2 mutation studies negative. BCR/ABL not detected. Has a h/o sleep apnea.\par \par \par \par \par  [de-identified] : Mother passed away 2 weeks ago-condolences offered.\par Has sleep apnea-does not use CPAP machine-had difficulty using it.\par No current c/o CP, SOB, N/V/D, change in bowel/bladder habits. No fevers, abnormal bruising. No c/o H/A, pruritis.\par Still menstruating regularly, though longer duration of bleeding with current cycle. Plans GYN MD f/u.

## 2023-04-18 NOTE — ASSESSMENT
[FreeTextEntry1] : Lab work reviewed.\par #1) Chronic, intermittent leukocytosis/thrombocytosis dating back at least to 9/2019 upon review of old lab work available. H/O chronically intermittently elevated hematocrit noted as well. Suspect may have reactive component to elevated Hct. in this patient with h/o sleep apnea, and elevated erythropoietin. Intermittent abnormalities suggestive of benign processes currently. CASE 2 mutation studies, BCR-ABL unremarkable 7/2021.\par \par #2) Suspect h/o decreased iron associated with menstrual blood loss. PO iron supplement with f/u lab work to be done. If refractory iron deficiency, can consider parenteral iron, and f/u GI evaluation as well. \par \par #3) h/o elevated PTT-negative LA screen. Corrected with 50:50 mixing study with no factor 8, 9, 11, 12 deficiency. ?Prekallikrein, HMG kininogen related. VW Ag/activity WNL 10/2021. \par No FH of a bleeding disorder, and patient has denied any personal h/o abnormal dar-procedural/operative bleeding or spontaneous mucosal bleeding. Follow clinically.\par \par #3) PO vitamin B 12 for h/o a vitamin B 12 level <400.\par \par If hematologic scenario worsens/changes, can decide if prudent to pursue further evaluation/BMB to r/o underlying, evolving BM disorder/MPD.\par \par Patient was given the opportunity to ask questions, and her questions have been answered to her apparent satisfaction at this time. She is agreeable to recommended f/u.\par

## 2023-04-18 NOTE — PHYSICAL EXAM
[Normal] : affect appropriate [de-identified] : non-toxic appearing [de-identified] : breathing appeared unlabored [de-identified] : coloration appeared normal

## 2023-04-26 ENCOUNTER — RX RENEWAL (OUTPATIENT)
Age: 53
End: 2023-04-26

## 2023-05-01 ENCOUNTER — APPOINTMENT (OUTPATIENT)
Dept: INTERNAL MEDICINE | Facility: CLINIC | Age: 53
End: 2023-05-01
Payer: COMMERCIAL

## 2023-05-01 ENCOUNTER — LABORATORY RESULT (OUTPATIENT)
Age: 53
End: 2023-05-01

## 2023-05-01 VITALS
WEIGHT: 234.5 LBS | OXYGEN SATURATION: 99 % | HEIGHT: 66 IN | RESPIRATION RATE: 16 BRPM | HEART RATE: 94 BPM | BODY MASS INDEX: 37.69 KG/M2 | SYSTOLIC BLOOD PRESSURE: 124 MMHG | TEMPERATURE: 97.2 F | DIASTOLIC BLOOD PRESSURE: 78 MMHG

## 2023-05-01 DIAGNOSIS — R92.2 INCONCLUSIVE MAMMOGRAM: ICD-10-CM

## 2023-05-01 DIAGNOSIS — R79.89 OTHER SPECIFIED ABNORMAL FINDINGS OF BLOOD CHEMISTRY: ICD-10-CM

## 2023-05-01 DIAGNOSIS — N63.0 UNSPECIFIED LUMP IN UNSPECIFIED BREAST: ICD-10-CM

## 2023-05-01 PROCEDURE — 81003 URINALYSIS AUTO W/O SCOPE: CPT | Mod: QW

## 2023-05-01 PROCEDURE — 36415 COLL VENOUS BLD VENIPUNCTURE: CPT

## 2023-05-01 PROCEDURE — 99214 OFFICE O/P EST MOD 30 MIN: CPT | Mod: 25

## 2023-05-01 RX ORDER — ONDANSETRON 4 MG/1
4 TABLET, ORALLY DISINTEGRATING ORAL
Qty: 30 | Refills: 0 | Status: DISCONTINUED | COMMUNITY
Start: 2022-06-03 | End: 2023-05-01

## 2023-05-01 RX ORDER — OFLOXACIN 3 MG/ML
0.3 SOLUTION/ DROPS OPHTHALMIC
Qty: 1 | Refills: 0 | Status: DISCONTINUED | COMMUNITY
Start: 2022-11-18 | End: 2023-05-01

## 2023-05-01 RX ORDER — PNV NO.153/FA/OM3/DHA/EPA/FISH 400-35-25
125 MCG TABLET,CHEWABLE ORAL
Qty: 90 | Refills: 0 | Status: DISCONTINUED | COMMUNITY
Start: 2021-06-29 | End: 2023-05-01

## 2023-05-01 NOTE — COUNSELING
[Encouraged to increase physical activity] : Encouraged to increase physical activity [Good understanding] : Patient has a good understanding of disease, goals and obesity follow-up plan [Benefits of weight loss discussed] : Benefits of weight loss discussed [Decrease Portions] : decrease portions [FreeTextEntry2] : ADA diet 1600 aníbal diet

## 2023-05-01 NOTE — HEALTH RISK ASSESSMENT
[Yes] : Yes [2 - 3 times a week (3 pts)] : 2 - 3  times a week (3 points) [3 or 4 (1 pt)] : 3 or 4  (1 point) [Less than monthly (1 pt)] : Less than monthly (1 point) [No] : In the past 12 months have you used drugs other than those required for medical reasons? No [No falls in past year] : Patient reported no falls in the past year [1] : 1) Little interest or pleasure doing things for several days (1) [0] : 2) Feeling down, depressed, or hopeless: Not at all (0) [Former] : Former [de-identified] : ENDO-  GI  [Audit-CScore] : 5 [[id number]] : 1

## 2023-05-01 NOTE — PLAN
[FreeTextEntry1] : Gynecology   history of abnormal mammogram.     Advised patient to follow-up with breast surgeon.  Repeat mammogram and breast sonogram.   prescription was given today for mammogram and breast sonogram.\par \par Heavy menstrual cycles advised patient follow-up with her gynecologist.  Check iron level, CBC, ferritin level\par \par Heme - Leukocytosis - follow up with heme -   Check CBC.\par Iron deficiency I  Monitor iron levels.\par \par Endo  - Diabetes - continue Xultophy 21 -unit subcutaneous injections q.d. as directed and Farxiga 10mg p.o.q.d; continue Rosuvastatin Calcium 40mg p.o.q.d;   ASA daily  \par Advised yearly retinal eye exam.\par Continue with Crestor and added Ramipril today - discussed renal protection - Reviewed side effects to medications \par Check an A1c.  As well as a comprehensive metabolic urine analysis with microalbumin.\par   Advised importance of following with ophthalmology for retinal eye exam.\par \par Adult BMI screening and followup:  The patient's BMI is about 37 . Counseled patient regarding BMI, healthy eating, portion control and exercise importance of diet to maintain a healthy weight. \par Counseled patient on importance of exercise to maintain a healthy weight \par Cardiology\par hyperlipidemia - continue Rosuvastatin Calcium 40 mg p.o.q.d. and Ezetimibe 10 mg p.o.q.d. - continue low cholesterol/low fat diet. Check   Fasting lipid panel, liver function test\par \par Cardio - Hypertension -  Patient was educated about hypertension and the importance of controlling the pressure through lifestyle modification which include low sodium  diet and  aerobic  exercise.  Also discussed the use of prescription medication which included their benefits and their side effects. We discussed the use of ASA 81 mg daily.   Having a BMI less than or equal to 25.  Continue with Altace 5 mg daily -  advised patient to continue with weight loss, continue with her walking exercise program.\par \par Patient  education  - COVID-19   Counseling and education provided to the patient.  Advised sign and symptoms of the virus.  Advised contact precautions.  Educated patient on proper hand washing and to participate in social distancing. Advised to wait 3 weeks and get the covid booster shot for the omicron variant\par Patient is in full awareness of the plan and agrees to it.  All pt question was answered.  \par \par I spent 25 Minutes with the patient, half of which we discussed finding on physical exam  and coordinated care.  As well as reviewed my plans and follow ups.\par Dragon speech recognition software was used to create portions of this document.  An attempt at proofreading has been made to minimize errors please call if any questions arise.\par \par I spent 32   Minutes with the patient, half of which we discussed finding on physical exam  and coordinated care.  As well as reviewed my plans and follow ups. Dragon speech recognition software was used to create portions of this document.  An attempt at proofreading has been made to minimize errors please call if any questions arise.\par \par Shingrix - discussed, patient to determine if vaccine is covered under medical benefits of current insurance plan and follow up for 1st dose if interested; otherwise, instructed to follow up at the pharmacy for vaccine \par \par Advised That she is past due on her tetanus shot.  Recommendation is every 10 years. \par \par Had pneumonia and flu shot 09/13/22

## 2023-05-01 NOTE — ASSESSMENT
[FreeTextEntry1] : Mrs. VELAZQUEZ is a 53 year  female, who present to the office  for fasting labs and a general check up

## 2023-05-01 NOTE — PHYSICAL EXAM
[Well Nourished] : well nourished [Well Developed] : well developed [Well-Appearing] : well-appearing [Normal Sclera/Conjunctiva] : normal sclera/conjunctiva [PERRL] : pupils equal round and reactive to light [EOMI] : extraocular movements intact [Normal Outer Ear/Nose] : the outer ears and nose were normal in appearance [Normal Oropharynx] : the oropharynx was normal [No JVD] : no jugular venous distention [No Lymphadenopathy] : no lymphadenopathy [Supple] : supple [Thyroid Normal, No Nodules] : the thyroid was normal and there were no nodules present [No Respiratory Distress] : no respiratory distress  [No Accessory Muscle Use] : no accessory muscle use [Clear to Auscultation] : lungs were clear to auscultation bilaterally [Normal Rate] : normal rate  [Regular Rhythm] : with a regular rhythm [Normal S1, S2] : normal S1 and S2 [No Carotid Bruits] : no carotid bruits [No Abdominal Bruit] : a ~M bruit was not heard ~T in the abdomen [No Varicosities] : no varicosities [Pedal Pulses Present] : the pedal pulses are present [No Edema] : there was no peripheral edema [No Palpable Aorta] : no palpable aorta [No Extremity Clubbing/Cyanosis] : no extremity clubbing/cyanosis [Soft] : abdomen soft [Non Tender] : non-tender [Non-distended] : non-distended [No Masses] : no abdominal mass palpated [No HSM] : no HSM [Normal Bowel Sounds] : normal bowel sounds [No CVA Tenderness] : no CVA  tenderness [No Spinal Tenderness] : no spinal tenderness [No Joint Swelling] : no joint swelling [Grossly Normal Strength/Tone] : grossly normal strength/tone [No Rash] : no rash [Both Ears Pierced] : both ears [Tattoo - Single] : a single tattoo was observed [Coordination Grossly Intact] : coordination grossly intact [No Focal Deficits] : no focal deficits [Normal Gait] : normal gait [Deep Tendon Reflexes (DTR)] : deep tendon reflexes were 2+ and symmetric [Speech Grossly Normal] : speech grossly normal [Normal Affect] : the affect was normal [Alert and Oriented x3] : oriented to person, place, and time [Normal Mood] : the mood was normal [Normal Insight/Judgement] : insight and judgment were intact [No Acute Distress] : no acute distress [Normal TMs] : both tympanic membranes were normal [Normal Posterior Cervical Nodes] : no posterior cervical lymphadenopathy [Normal Anterior Cervical Nodes] : no anterior cervical lymphadenopathy [de-identified] : w/ murmur  [de-identified] :  as per gynecology/ breast surgeon [de-identified] : obese [FreeTextEntry1] :  as per gynecology [de-identified] : right rib cage pain to palp   no bruising noted  no rashes noted  no CVa tenderness

## 2023-05-01 NOTE — REVIEW OF SYSTEMS
[Dysmenorrhea] : dysmenorrhea [Negative] : Heme/Lymph [Recent Change In Weight] : ~T recent weight change [Fever] : no fever [Fatigue] : no fatigue [Discharge] : no discharge [Pain] : no pain [Vision Problems] : no vision problems [Itching] : no itching [Earache] : no earache [Hearing Loss] : no hearing loss [Nasal Discharge] : no nasal discharge [Sore Throat] : no sore throat [Chest Pain] : no chest pain [Palpitations] : no palpitations [Lower Ext Edema] : no lower extremity edema [Orthopnea] : no orthopnea [Paroxysmal Nocturnal Dyspnea] : no paroxysmal nocturnal dyspnea [Shortness Of Breath] : no shortness of breath [Cough] : no cough [Dyspnea on Exertion] : no dyspnea on exertion [Nausea] : no nausea [Constipation] : no constipation [Diarrhea] : diarrhea [Heartburn] : no heartburn [Melena] : no melena [Dysuria] : no dysuria [Joint Pain] : no joint pain [Muscle Pain] : no muscle pain [Itching] : no itching [Skin Rash] : no skin rash [Headache] : no headache [Dizziness] : no dizziness [Fainting] : no fainting [Memory Loss] : no memory loss [Easy Bruising] : no easy bruising [Swollen Glands] : no swollen glands [FreeTextEntry8] : heavy menses

## 2023-05-01 NOTE — HISTORY OF PRESENT ILLNESS
[FreeTextEntry1] : Fasting labs.  [de-identified] : Mrs. VELAZQUEZ is a 52 year  female, who present to the office for fasting labs and a general check up. \par  Patient states recently she is trying to walk more, lost a little bit of weight.    states she has to follow back up with hematology regarding low iron levels.  Last menstrual cycle was heavy and long.  Denies follow-up with gynecology.    Patient also requesting renewal/prescription for mammogram and breast sonogram.  Has a follow-up with breast surgeon Dr. Walls.  \par Patient denies going for recent eye exam.  Has noticed some change in her vision.

## 2023-05-09 DIAGNOSIS — N39.0 URINARY TRACT INFECTION, SITE NOT SPECIFIED: ICD-10-CM

## 2023-05-09 LAB
25(OH)D3 SERPL-MCNC: 19.7 NG/ML
ALBUMIN SERPL ELPH-MCNC: 4 G/DL
ALP BLD-CCNC: 127 U/L
ALT SERPL-CCNC: 13 U/L
ANION GAP SERPL CALC-SCNC: 11 MMOL/L
AST SERPL-CCNC: 10 U/L
BACTERIA UR CULT: ABNORMAL
BASOPHILS # BLD AUTO: 0.04 K/UL
BASOPHILS NFR BLD AUTO: 0.4 %
BILIRUB SERPL-MCNC: 0.2 MG/DL
BUN SERPL-MCNC: 11 MG/DL
CALCIUM SERPL-MCNC: 9.2 MG/DL
CHLORIDE SERPL-SCNC: 101 MMOL/L
CHOLEST SERPL-MCNC: 190 MG/DL
CO2 SERPL-SCNC: 26 MMOL/L
CREAT SERPL-MCNC: 0.59 MG/DL
CREAT SPEC-SCNC: 48 MG/DL
EGFR: 107 ML/MIN/1.73M2
EOSINOPHIL # BLD AUTO: 0.09 K/UL
EOSINOPHIL NFR BLD AUTO: 1 %
ESTIMATED AVERAGE GLUCOSE: 206 MG/DL
FERRITIN SERPL-MCNC: 13 NG/ML
GLUCOSE SERPL-MCNC: 158 MG/DL
HBA1C MFR BLD HPLC: 8.8 %
HCT VFR BLD CALC: 36.2 %
HDLC SERPL-MCNC: 45 MG/DL
HGB BLD-MCNC: 9.7 G/DL
IMM GRANULOCYTES NFR BLD AUTO: 0.5 %
IRON SATN MFR SERPL: 4 %
IRON SERPL-MCNC: 16 UG/DL
LDLC SERPL CALC-MCNC: 126 MG/DL
LYMPHOCYTES # BLD AUTO: 2.26 K/UL
LYMPHOCYTES NFR BLD AUTO: 23.9 %
MAN DIFF?: NORMAL
MCHC RBC-ENTMCNC: 20.1 PG
MCHC RBC-ENTMCNC: 26.8 GM/DL
MCV RBC AUTO: 74.9 FL
MICROALBUMIN 24H UR DL<=1MG/L-MCNC: <1.2 MG/DL
MICROALBUMIN/CREAT 24H UR-RTO: NORMAL MG/G
MONOCYTES # BLD AUTO: 0.57 K/UL
MONOCYTES NFR BLD AUTO: 6 %
NEUTROPHILS # BLD AUTO: 6.46 K/UL
NEUTROPHILS NFR BLD AUTO: 68.2 %
NONHDLC SERPL-MCNC: 145 MG/DL
PLATELET # BLD AUTO: 452 K/UL
POTASSIUM SERPL-SCNC: 4.6 MMOL/L
PROT SERPL-MCNC: 7.3 G/DL
RBC # BLD: 4.83 M/UL
RBC # FLD: 22.9 %
SODIUM SERPL-SCNC: 137 MMOL/L
TIBC SERPL-MCNC: 440 UG/DL
TRIGL SERPL-MCNC: 93 MG/DL
TSH SERPL-ACNC: 3 UIU/ML
UIBC SERPL-MCNC: 424 UG/DL
URINE CYTOLOGY: NORMAL
WBC # FLD AUTO: 9.47 K/UL

## 2023-05-12 ENCOUNTER — LABORATORY RESULT (OUTPATIENT)
Age: 53
End: 2023-05-12

## 2023-05-12 LAB
FOLATE SERPL-MCNC: 14.7 NG/ML
VIT B12 SERPL-MCNC: 522 PG/ML

## 2023-05-13 LAB
BASOPHILS # BLD AUTO: 0.04 K/UL
BASOPHILS NFR BLD AUTO: 0.3 %
EOSINOPHIL # BLD AUTO: 0.1 K/UL
EOSINOPHIL NFR BLD AUTO: 0.9 %
HCT VFR BLD CALC: 36.8 %
HGB BLD-MCNC: 10.1 G/DL
IMM GRANULOCYTES NFR BLD AUTO: 0.3 %
LYMPHOCYTES # BLD AUTO: 2.6 K/UL
LYMPHOCYTES NFR BLD AUTO: 22.5 %
MAN DIFF?: NORMAL
MCHC RBC-ENTMCNC: 20.5 PG
MCHC RBC-ENTMCNC: 27.4 GM/DL
MCV RBC AUTO: 74.8 FL
MONOCYTES # BLD AUTO: 0.74 K/UL
MONOCYTES NFR BLD AUTO: 6.4 %
NEUTROPHILS # BLD AUTO: 8.02 K/UL
NEUTROPHILS NFR BLD AUTO: 69.6 %
PLATELET # BLD AUTO: 462 K/UL
RBC # BLD: 4.92 M/UL
RBC # BLD: 4.92 M/UL
RBC # FLD: 23.7 %
RETICS # AUTO: 3 %
RETICS AGGREG/RBC NFR: 149.6 K/UL
WBC # FLD AUTO: 11.54 K/UL

## 2023-05-16 ENCOUNTER — APPOINTMENT (OUTPATIENT)
Dept: HEMATOLOGY ONCOLOGY | Facility: CLINIC | Age: 53
End: 2023-05-16
Payer: COMMERCIAL

## 2023-05-16 PROCEDURE — 99213 OFFICE O/P EST LOW 20 MIN: CPT | Mod: 95

## 2023-05-16 NOTE — PHYSICAL EXAM
[Normal] : affect appropriate [de-identified] : non-toxic appearing [de-identified] : breathing appeared unlabored [de-identified] : coloration appeared normal

## 2023-05-16 NOTE — HISTORY OF PRESENT ILLNESS
[de-identified] : 7/2021-Patient presented at the request of her PCP for leukocytosis, found on routine lab work by PCP-goes for lab work Q 3 months for Hemoglobin A1C. Also noted intermittent elevation of RBC. Erythropoietin level elevated at 43. CASE 2 mutation studies negative. BCR/ABL not detected. Has a h/o sleep apnea.\par \par \par \par \par  [de-identified] : Menstrual bleeding x 4 weeks, stopped x 1 week, and now bleeding again for 2 weeks.\par Back on PO iron consistently x ~ 3 weeks.\par Has sleep apnea-does not use CPAP machine-had difficulty using it.\par No current c/o CP, SOB, N/V/D, change in bowel/bladder habits. No fevers, abnormal bruising. No c/o H/A, pruritis.\par

## 2023-05-16 NOTE — ASSESSMENT
[FreeTextEntry1] : Lab work reviewed.\par #1) Chronic, intermittent leukocytosis/thrombocytosis dating back at least to 9/2019 upon review of old lab work available. H/O chronically intermittently elevated hematocrit noted as well. Suspect may have reactive component to elevated Hct. in this patient with h/o sleep apnea, and elevated erythropoietin. Intermittent abnormalities suggestive of benign processes currently. CASE 2 mutation studies, BCR-ABL unremarkable 7/2021.\par Continue to monitor CBC with differential.\par \par #2) anemia/h/o decreased iron suspect associated with menstrual blood loss/abnormal vaginal bleeding. Continue with PO iron supplement with interval f/u lab work to be done. If refractory iron deficiency, can reconsider parenteral iron, and f/u GI evaluation as well (last colonoscopy 9/2022). \par GYN f/u as planned.\par \par #3) h/o elevated PTT-negative LA screen. Corrected with 50:50 mixing study with no factor 8, 9, 11, 12 deficiency. ?Prekallikrein, HMG kininogen related. VW Ag/activity WNL 10/2021. \par No FH of a bleeding disorder, and patient has denied any personal h/o abnormal dar-procedural/operative bleeding. Follow-up coag studies ordered.\par \par #3) PO vitamin B 12 for h/o a vitamin B 12 level <400.\par \par If hematologic scenario worsens/changes, can decide if prudent to pursue further evaluation/BMB to r/o underlying, evolving BM disorder/MPD.\par \par Patient was given the opportunity to ask questions, and her questions have been answered to her apparent satisfaction at this time. She is agreeable to recommended f/u.\par

## 2023-05-16 NOTE — REASON FOR VISIT
[Follow-Up Visit] : a follow-up visit for [Home] : at home, [unfilled] , at the time of the visit. [Medical Office: ([facility])___] : at the medical office located in  [Patient] : the patient [Self] : self [FreeTextEntry2] : leukocytosis

## 2023-05-22 ENCOUNTER — RX RENEWAL (OUTPATIENT)
Age: 53
End: 2023-05-22

## 2023-05-23 ENCOUNTER — APPOINTMENT (OUTPATIENT)
Dept: INTERNAL MEDICINE | Facility: CLINIC | Age: 53
End: 2023-05-23
Payer: COMMERCIAL

## 2023-05-23 VITALS
DIASTOLIC BLOOD PRESSURE: 76 MMHG | HEART RATE: 101 BPM | HEIGHT: 66 IN | WEIGHT: 234 LBS | OXYGEN SATURATION: 99 % | RESPIRATION RATE: 16 BRPM | BODY MASS INDEX: 37.61 KG/M2 | SYSTOLIC BLOOD PRESSURE: 118 MMHG | TEMPERATURE: 98.4 F

## 2023-05-23 DIAGNOSIS — R82.81 PYURIA: ICD-10-CM

## 2023-05-23 PROCEDURE — 99214 OFFICE O/P EST MOD 30 MIN: CPT

## 2023-05-23 RX ORDER — NITROFURANTOIN (MONOHYDRATE/MACROCRYSTALS) 25; 75 MG/1; MG/1
100 CAPSULE ORAL TWICE DAILY
Qty: 14 | Refills: 0 | Status: COMPLETED | COMMUNITY
Start: 2023-05-09 | End: 2023-05-23

## 2023-05-23 NOTE — COUNSELING
[Benefits of weight loss discussed] : Benefits of weight loss discussed [Encouraged to increase physical activity] : Encouraged to increase physical activity [Decrease Portions] : decrease portions [Good understanding] : Patient has a good understanding of disease, goals and obesity follow-up plan [FreeTextEntry2] : ADA diet 1600 an[address]bal diet

## 2023-05-23 NOTE — REVIEW OF SYSTEMS
[Dysmenorrhea] : dysmenorrhea [Negative] : Heme/Lymph [Fever] : no fever [Fatigue] : no fatigue [Recent Change In Weight] : ~T no recent weight change [Discharge] : no discharge [Pain] : no pain [Vision Problems] : no vision problems [Itching] : no itching [Earache] : no earache [Hearing Loss] : no hearing loss [Nasal Discharge] : no nasal discharge [Sore Throat] : no sore throat [Chest Pain] : no chest pain [Palpitations] : no palpitations [Lower Ext Edema] : no lower extremity edema [Orthopnea] : no orthopnea [Paroxysmal Nocturnal Dyspnea] : no paroxysmal nocturnal dyspnea [Shortness Of Breath] : no shortness of breath [Cough] : no cough [Dyspnea on Exertion] : no dyspnea on exertion [Nausea] : no nausea [Constipation] : no constipation [Diarrhea] : diarrhea [Heartburn] : no heartburn [Melena] : no melena [Dysuria] : no dysuria [Joint Pain] : no joint pain [Muscle Pain] : no muscle pain [Itching] : no itching [Skin Rash] : no skin rash [Headache] : no headache [Dizziness] : no dizziness [Fainting] : no fainting [Memory Loss] : no memory loss [Easy Bruising] : no easy bruising [Swollen Glands] : no swollen glands [FreeTextEntry8] : heavy menses

## 2023-05-23 NOTE — HISTORY OF PRESENT ILLNESS
[FreeTextEntry1] : Fasting labs.  [de-identified] : Mrs. VELAZQUEZ is a 53 year  female, with a past medical history as noted below,who present to the office  today for Repeat urinalysis.  Patient states she is finished with the antibiotics for pyuria feeling much better.    Did follow-up with hematologist was advised to continue current iron supplementation.    States she made an appoint with her gynecologist discussed uterine ablation.\par  patient denies chest pain, shortness of breath, nausea or vomiting.    Denies any hyper or hypoglycemic events.  States her blood sugars are doing better since she has been watching her diet

## 2023-05-23 NOTE — HEALTH RISK ASSESSMENT
[Yes] : Yes [2 - 3 times a week (3 pts)] : 2 - 3  times a week (3 points) [3 or 4 (1 pt)] : 3 or 4  (1 point) [Less than monthly (1 pt)] : Less than monthly (1 point) [No] : In the past 12 months have you used drugs other than those required for medical reasons? No [No falls in past year] : Patient reported no falls in the past year [1] : 1) Little interest or pleasure doing things for several days (1) [0] : 2) Feeling down, depressed, or hopeless: Not at all (0) [Former] : Former [de-identified] : ENDO-  GI  [Audit-CScore] : 5 [ITI1Ycodf] : 1

## 2023-05-23 NOTE — PHYSICAL EXAM
[No Acute Distress] : no acute distress [Well Nourished] : well nourished [Well Developed] : well developed [Well-Appearing] : well-appearing [Normal Sclera/Conjunctiva] : normal sclera/conjunctiva [PERRL] : pupils equal round and reactive to light [EOMI] : extraocular movements intact [Normal Outer Ear/Nose] : the outer ears and nose were normal in appearance [Normal Oropharynx] : the oropharynx was normal [Normal TMs] : both tympanic membranes were normal [No JVD] : no jugular venous distention [No Lymphadenopathy] : no lymphadenopathy [Supple] : supple [Thyroid Normal, No Nodules] : the thyroid was normal and there were no nodules present [No Respiratory Distress] : no respiratory distress  [No Accessory Muscle Use] : no accessory muscle use [Clear to Auscultation] : lungs were clear to auscultation bilaterally [Normal Rate] : normal rate  [Regular Rhythm] : with a regular rhythm [Normal S1, S2] : normal S1 and S2 [No Carotid Bruits] : no carotid bruits [No Abdominal Bruit] : a ~M bruit was not heard ~T in the abdomen [No Varicosities] : no varicosities [Pedal Pulses Present] : the pedal pulses are present [No Edema] : there was no peripheral edema [No Palpable Aorta] : no palpable aorta [No Extremity Clubbing/Cyanosis] : no extremity clubbing/cyanosis [Soft] : abdomen soft [Non Tender] : non-tender [Non-distended] : non-distended [No Masses] : no abdominal mass palpated [No HSM] : no HSM [Normal Bowel Sounds] : normal bowel sounds [Normal Posterior Cervical Nodes] : no posterior cervical lymphadenopathy [Normal Anterior Cervical Nodes] : no anterior cervical lymphadenopathy [No CVA Tenderness] : no CVA  tenderness [No Spinal Tenderness] : no spinal tenderness [No Joint Swelling] : no joint swelling [Grossly Normal Strength/Tone] : grossly normal strength/tone [No Rash] : no rash [Both Ears Pierced] : both ears [Tattoo - Single] : a single tattoo was observed [Coordination Grossly Intact] : coordination grossly intact [No Focal Deficits] : no focal deficits [Normal Gait] : normal gait [Deep Tendon Reflexes (DTR)] : deep tendon reflexes were 2+ and symmetric [Speech Grossly Normal] : speech grossly normal [Normal Affect] : the affect was normal [Alert and Oriented x3] : oriented to person, place, and time [Normal Mood] : the mood was normal [Normal Insight/Judgement] : insight and judgment were intact [de-identified] : w/ murmur  [de-identified] :  as per gynecology/ breast surgeon [de-identified] : obese [FreeTextEntry1] :  as per gynecology [de-identified] :  as per gynecology

## 2023-05-23 NOTE — PLAN
[FreeTextEntry1] : Gynecology   history of abnormal mammogram.     Advised patient to follow-up with breast surgeon. \par \par Heavy menstrual cycles advised patient follow-up with her gynecologist.  To discuss uterine ablation.  Secondary to chronic anemia\par \par Heme - Leukocytosis -  iron deficiency anemia secondary to [place] menorrhagia.  Advised to follow-up with hematology/oncology.   continue with iron supplementation.  Follow-up with gynecology discuss uterine ablation\par \par Endo  - Diabetes -  increase Xultophy 36  -unit subcutaneous injections q.d. as directed and Farxiga 10mg p.o.q.d; continue Rosuvastatin Calcium 40mg p.o.q.d;   ASA daily.\par  ordered freestyle sensor to see if we can monitor her blood sugars and make adjustments to prevent a hypoglycemic event she is possibly having  \par Advised yearly retinal eye exam.\par Continue with Crestor and added Ramipril today - discussed renal protection - Reviewed side effects to medications \par Advised to follow-up with endocrinology to consider changing Farxiga secondary to recurrent infection\par  return to office 3 months for fasting labs.\par \par Adult BMI screening and followup:  The patient's BMI is about 37 . Counseled patient regarding BMI, healthy eating, portion control and exercise importance of diet to maintain a healthy weight. \par Counseled patient on importance of exercise to maintain a healthy weight \par Cardiology\par hyperlipidemia - continue Rosuvastatin Calcium 40 mg p.o.q.d. and Ezetimibe 10 mg p.o.q.d. - continue low cholesterol/low fat diet. Check     Monitor fasting lipid panel.\par \par Cardio - Hypertension -  Patient was educated about hypertension and the importance of controlling the pressure through lifestyle modification which include low sodium  diet and  aerobic  exercise.  Also discussed the use of prescription medication which included their benefits and their side effects. We discussed the use of ASA 81 mg daily.   Having a BMI less than or equal to 25.  Continue with Altace 5 mg daily -  advised patient to continue with weight loss, continue with her walking exercise program.\par \par Patient  education  - COVID-19   Counseling and education provided to the patient.  Advised sign and symptoms of the virus.  Advised contact precautions.  Educated patient on proper hand washing and to participate in social distancing. Advised to wait 3 weeks and get the covid booster shot for the omicron variant\par Patient is in full awareness of the plan and agrees to it.  All pt question was answered.  \par \par I spent 30  Minutes with the patient, half of which we discussed finding on physical exam  and coordinated care.  As well as reviewed my plans and follow ups.\par Dragon speech recognition software was used to create portions of this document.  An attempt at proofreading has been made to minimize errors please call if any questions arise.\par \par I spent 32   Minutes with the patient, half of which we discussed finding on physical exam  and coordinated care.  As well as reviewed my plans and follow ups. Dragon speech recognition software was used to create portions of this document.  An attempt at proofreading has been made to minimize errors please call if any questions arise.\par \par Shingrix - discussed, patient to determine if vaccine is covered under medical benefits of current insurance plan and follow up for 1st dose if interested; otherwise, instructed to follow up at the pharmacy for vaccine \par \par Advised That she is past due on her tetanus shot.  Recommendation is every 10 years. \par \par Had pneumonia and flu shot 09/13/22

## 2023-05-23 NOTE — ASSESSMENT
[FreeTextEntry1] : Mrs. VELAZQUEZ is a 53 year  female, who present to the office  for  repeat urine analysis and follow-up on blood glucose

## 2023-05-27 ENCOUNTER — TRANSCRIPTION ENCOUNTER (OUTPATIENT)
Age: 53
End: 2023-05-27

## 2023-06-06 LAB — BACTERIA UR CULT: ABNORMAL

## 2023-06-07 ENCOUNTER — NON-APPOINTMENT (OUTPATIENT)
Age: 53
End: 2023-06-07

## 2023-06-07 ENCOUNTER — RX RENEWAL (OUTPATIENT)
Age: 53
End: 2023-06-07

## 2023-06-07 ENCOUNTER — APPOINTMENT (OUTPATIENT)
Dept: INTERNAL MEDICINE | Facility: CLINIC | Age: 53
End: 2023-06-07
Payer: COMMERCIAL

## 2023-06-07 VITALS
TEMPERATURE: 98.1 F | WEIGHT: 234 LBS | SYSTOLIC BLOOD PRESSURE: 116 MMHG | OXYGEN SATURATION: 98 % | HEART RATE: 99 BPM | BODY MASS INDEX: 37.61 KG/M2 | HEIGHT: 66 IN | RESPIRATION RATE: 17 BRPM | DIASTOLIC BLOOD PRESSURE: 78 MMHG

## 2023-06-07 DIAGNOSIS — E11.65 TYPE 2 DIABETES MELLITUS WITH HYPERGLYCEMIA: ICD-10-CM

## 2023-06-07 DIAGNOSIS — R30.0 DYSURIA: ICD-10-CM

## 2023-06-07 LAB
BILIRUB UR QL STRIP: NEGATIVE
CLARITY UR: CLEAR
COLLECTION METHOD: NORMAL
GLUCOSE UR-MCNC: ABNORMAL
HCG UR QL: 0.2 EU/DL
HGB UR QL STRIP.AUTO: ABNORMAL
KETONES UR-MCNC: NEGATIVE
LEUKOCYTE ESTERASE UR QL STRIP: NEGATIVE
NITRITE UR QL STRIP: NEGATIVE
PH UR STRIP: 5.5
PROT UR STRIP-MCNC: NEGATIVE
SP GR UR STRIP: 1.01

## 2023-06-07 PROCEDURE — 81003 URINALYSIS AUTO W/O SCOPE: CPT | Mod: QW

## 2023-06-07 PROCEDURE — 99213 OFFICE O/P EST LOW 20 MIN: CPT | Mod: 25

## 2023-06-07 RX ORDER — DAPAGLIFLOZIN 10 MG/1
10 TABLET, FILM COATED ORAL
Qty: 30 | Refills: 0 | Status: DISCONTINUED | COMMUNITY
Start: 2018-10-16 | End: 2023-06-07

## 2023-06-07 NOTE — REVIEW OF SYSTEMS
[Fever] : no fever [Fatigue] : no fatigue [Recent Change In Weight] : ~T no recent weight change [Discharge] : no discharge [Pain] : no pain [Vision Problems] : no vision problems [Itching] : no itching [Earache] : no earache [Hearing Loss] : no hearing loss [Nasal Discharge] : no nasal discharge [Sore Throat] : no sore throat [Chest Pain] : no chest pain [Palpitations] : no palpitations [Lower Ext Edema] : no lower extremity edema [Orthopnea] : no orthopnea [Paroxysmal Nocturnal Dyspnea] : no paroxysmal nocturnal dyspnea [Shortness Of Breath] : no shortness of breath [Cough] : no cough [Dyspnea on Exertion] : no dyspnea on exertion [Nausea] : no nausea [Constipation] : no constipation [Diarrhea] : diarrhea [Heartburn] : no heartburn [Melena] : no melena [Dysuria] : no dysuria [Dysmenorrhea] : dysmenorrhea [Joint Pain] : no joint pain [Muscle Pain] : no muscle pain [Skin Rash] : no skin rash [Headache] : no headache [Dizziness] : no dizziness [Fainting] : no fainting [Memory Loss] : no memory loss [Easy Bruising] : no easy bruising [Swollen Glands] : no swollen glands [Negative] : Heme/Lymph [FreeTextEntry8] : heavy menses

## 2023-06-07 NOTE — DATA REVIEWED
[FreeTextEntry1] : Hemoglobin A1c from May 2023 was 8.8\par  reviewed recent urine analysis which was negative except glucosuria\par Reviewed urine culture May 23 patient finished Cipro

## 2023-06-07 NOTE — HISTORY OF PRESENT ILLNESS
[FreeTextEntry1] : Recurrent UTIs and vaginal candidiasis [de-identified] : Mrs. VELAZQUEZ is a 53 year  female, with a past medical history as noted below,who present to the office  today for  evaluation of recurrent urinary tract infections and vaginal candidiasis.  Patient states  she finished ciprofloxacin for urinary tract infection.  Few days later started with pain with urination and discomfort with urination.  Started over-the-counter Monistat which seem to be helping.\par  denies any fever chills or night sweats

## 2023-06-07 NOTE — PHYSICAL EXAM
[No Acute Distress] : no acute distress [Well Nourished] : well nourished [Well Developed] : well developed [Well-Appearing] : well-appearing [Normal Sclera/Conjunctiva] : normal sclera/conjunctiva [PERRL] : pupils equal round and reactive to light [EOMI] : extraocular movements intact [Normal Outer Ear/Nose] : the outer ears and nose were normal in appearance [Normal Oropharynx] : the oropharynx was normal [Normal TMs] : both tympanic membranes were normal [No JVD] : no jugular venous distention [No Lymphadenopathy] : no lymphadenopathy [Supple] : supple [Thyroid Normal, No Nodules] : the thyroid was normal and there were no nodules present [No Respiratory Distress] : no respiratory distress  [No Accessory Muscle Use] : no accessory muscle use [Clear to Auscultation] : lungs were clear to auscultation bilaterally [Normal Rate] : normal rate  [Regular Rhythm] : with a regular rhythm [Normal S1, S2] : normal S1 and S2 [No Carotid Bruits] : no carotid bruits [No Abdominal Bruit] : a ~M bruit was not heard ~T in the abdomen [No Varicosities] : no varicosities [Pedal Pulses Present] : the pedal pulses are present [No Edema] : there was no peripheral edema [No Palpable Aorta] : no palpable aorta [No Extremity Clubbing/Cyanosis] : no extremity clubbing/cyanosis [Soft] : abdomen soft [Non Tender] : non-tender [Non-distended] : non-distended [No Masses] : no abdominal mass palpated [No HSM] : no HSM [Normal Bowel Sounds] : normal bowel sounds [Normal Posterior Cervical Nodes] : no posterior cervical lymphadenopathy [Normal Anterior Cervical Nodes] : no anterior cervical lymphadenopathy [No CVA Tenderness] : no CVA  tenderness [No Spinal Tenderness] : no spinal tenderness [No Joint Swelling] : no joint swelling [Grossly Normal Strength/Tone] : grossly normal strength/tone [No Rash] : no rash [Both Ears Pierced] : both ears [Tattoo - Single] : a single tattoo was observed [Coordination Grossly Intact] : coordination grossly intact [No Focal Deficits] : no focal deficits [Normal Gait] : normal gait [Deep Tendon Reflexes (DTR)] : deep tendon reflexes were 2+ and symmetric [Speech Grossly Normal] : speech grossly normal [Normal Affect] : the affect was normal [Alert and Oriented x3] : oriented to person, place, and time [Normal Mood] : the mood was normal [Normal Insight/Judgement] : insight and judgment were intact [de-identified] : w/ murmur  [de-identified] :  as per gynecology/ breast surgeon [de-identified] : obese [FreeTextEntry1] :  as per gynecology [de-identified] :  as per gynecology

## 2023-06-07 NOTE — HEALTH RISK ASSESSMENT
[Yes] : Yes [2 - 3 times a week (3 pts)] : 2 - 3  times a week (3 points) [3 or 4 (1 pt)] : 3 or 4  (1 point) [Less than monthly (1 pt)] : Less than monthly (1 point) [No] : In the past 12 months have you used drugs other than those required for medical reasons? No [No falls in past year] : Patient reported no falls in the past year [1] : 1) Little interest or pleasure doing things for several days (1) [0] : 2) Feeling down, depressed, or hopeless: Not at all (0) [de-identified] : ENDO-  GI  [Audit-CScore] : 5 [MUG4Zcvjs] : 1 [Former] : Former

## 2023-06-07 NOTE — ASSESSMENT
[FreeTextEntry1] : Mrs. VELAZQUEZ is a 53 year  female, who present to the office  for  repeat urine analysis,  and evaluation of recurrent yeast candidiasis as well as recurrent urinary tract infections

## 2023-06-07 NOTE — PLAN
[FreeTextEntry1] : Gynecology   -  recurrent yeast infection.  Advised to take Diflucan.  Follow-up with gynecology.  DC Farxiga.\par  advised to follow-up gynecology secondary uterine ablation.\par \par Endo  - Diabetes -  increase Xultophy 36  -unit subcutaneous injections q.d. as directed   Discontinue Farxiga secondary recurrent urinary tract infections/yeast infections. continue Rosuvastatin Calcium 40mg p.o.q.d;   ASA daily.\par   Advised to place  the freestyle brandon sensor on   to make adjustments to her insulin injections.\par  follow-up with endocrinology.  \par Advised yearly retinal eye exam.\par Continue with Crestor and added Ramipril today - discussed renal protection - Reviewed side effects to   Medication\par \par Adult BMI screening and followup:  The patient's BMI is about 37 . Counseled patient regarding BMI, healthy eating, portion control and exercise importance of diet to maintain a healthy weight. \par Counseled patient on importance of exercise to maintain a healthy weight \par Cardiology\par hyperlipidemia - continue Rosuvastatin Calcium 40 mg p.o.q.d. and Ezetimibe 10 mg p.o.q.d. - continue low cholesterol/low fat diet. Check    \par \par Cardio - Hypertension -  Patient was educated about hypertension and the importance of controlling the pressure through lifestyle modification which include low sodium  diet and  aerobic  exercise.  Also discussed the use of prescription medication which included their benefits and their side effects. We discussed the use of ASA 81 mg daily.   Having a BMI less than or equal to 25.  Continue with Altace 5 mg daily -  advised patient to continue with weight loss, continue with her walking exercise program.\par \par Patient  education  - COVID-19   Counseling and education provided to the patient.  Advised sign and symptoms of the virus.  Advised contact precautions.  Educated patient on proper hand washing and to participate in social distancing. Advised to wait 3 weeks and get the covid booster shot for the omicron variant\par Patient is in full awareness of the plan and agrees to it.  All pt question was answered.  \par \par I spent 25 Minutes with the patient, half of which we discussed finding on physical exam  and coordinated care.  As well as reviewed my plans and follow ups.\par Dragon speech recognition software was used to create portions of this document.  An attempt at proofreading has been made to minimize errors please call if any questions arise.

## 2023-06-08 ENCOUNTER — OUTPATIENT (OUTPATIENT)
Dept: OUTPATIENT SERVICES | Facility: HOSPITAL | Age: 53
LOS: 1 days | Discharge: HOME | End: 2023-06-08

## 2023-06-08 DIAGNOSIS — D72.829 ELEVATED WHITE BLOOD CELL COUNT, UNSPECIFIED: ICD-10-CM

## 2023-06-08 DIAGNOSIS — Z98.890 OTHER SPECIFIED POSTPROCEDURAL STATES: Chronic | ICD-10-CM

## 2023-06-09 ENCOUNTER — NON-APPOINTMENT (OUTPATIENT)
Age: 53
End: 2023-06-09

## 2023-06-09 LAB — BACTERIA UR CULT: NORMAL

## 2023-06-15 ENCOUNTER — OUTPATIENT (OUTPATIENT)
Dept: OUTPATIENT SERVICES | Facility: HOSPITAL | Age: 53
LOS: 1 days | End: 2023-06-15
Payer: COMMERCIAL

## 2023-06-15 ENCOUNTER — RESULT REVIEW (OUTPATIENT)
Age: 53
End: 2023-06-15

## 2023-06-15 ENCOUNTER — APPOINTMENT (OUTPATIENT)
Dept: MAMMOGRAPHY | Facility: CLINIC | Age: 53
End: 2023-06-15
Payer: COMMERCIAL

## 2023-06-15 ENCOUNTER — APPOINTMENT (OUTPATIENT)
Dept: ULTRASOUND IMAGING | Facility: CLINIC | Age: 53
End: 2023-06-15
Payer: COMMERCIAL

## 2023-06-15 DIAGNOSIS — Z98.890 OTHER SPECIFIED POSTPROCEDURAL STATES: Chronic | ICD-10-CM

## 2023-06-15 DIAGNOSIS — Z12.39 ENCOUNTER FOR OTHER SCREENING FOR MALIGNANT NEOPLASM OF BREAST: ICD-10-CM

## 2023-06-15 DIAGNOSIS — Z00.00 ENCOUNTER FOR GENERAL ADULT MEDICAL EXAMINATION WITHOUT ABNORMAL FINDINGS: ICD-10-CM

## 2023-06-15 DIAGNOSIS — Z00.8 ENCOUNTER FOR OTHER GENERAL EXAMINATION: ICD-10-CM

## 2023-06-15 DIAGNOSIS — R92.8 OTHER ABNORMAL AND INCONCLUSIVE FINDINGS ON DIAGNOSTIC IMAGING OF BREAST: ICD-10-CM

## 2023-06-15 PROCEDURE — 76641 ULTRASOUND BREAST COMPLETE: CPT | Mod: 26,RT

## 2023-06-15 PROCEDURE — 77067 SCR MAMMO BI INCL CAD: CPT | Mod: 26

## 2023-06-15 PROCEDURE — 76641 ULTRASOUND BREAST COMPLETE: CPT

## 2023-06-15 PROCEDURE — 77063 BREAST TOMOSYNTHESIS BI: CPT

## 2023-06-15 PROCEDURE — 77063 BREAST TOMOSYNTHESIS BI: CPT | Mod: 26

## 2023-06-15 PROCEDURE — 77067 SCR MAMMO BI INCL CAD: CPT

## 2023-06-16 ENCOUNTER — APPOINTMENT (OUTPATIENT)
Dept: OBGYN | Facility: CLINIC | Age: 53
End: 2023-06-16
Payer: COMMERCIAL

## 2023-06-16 VITALS
DIASTOLIC BLOOD PRESSURE: 78 MMHG | HEART RATE: 90 BPM | SYSTOLIC BLOOD PRESSURE: 121 MMHG | WEIGHT: 239 LBS | BODY MASS INDEX: 38.41 KG/M2 | HEIGHT: 66 IN

## 2023-06-16 DIAGNOSIS — N92.0 EXCESSIVE AND FREQUENT MENSTRUATION WITH REGULAR CYCLE: ICD-10-CM

## 2023-06-16 PROCEDURE — 99213 OFFICE O/P EST LOW 20 MIN: CPT

## 2023-06-16 RX ORDER — NORETHINDRONE 0.35 MG/1
0.35 TABLET ORAL
Qty: 3 | Refills: 3 | Status: ACTIVE | COMMUNITY
Start: 2023-06-16 | End: 1900-01-01

## 2023-06-16 NOTE — PLAN
[FreeTextEntry1] : 52y/o presents with heavy menstrual bleeding in the setting of perimenopause\par \par #Perimenopause\par -Pt with heavy menstrual bleeding over the past 2 years, now with symptomatic anemia, seeing hematology\par -Discussed medical vs surgical management. Pt considering ablation, but would like a trial of medical treatment first. Rx norethindrone sent, to be take TID during this menstrual period then decreased to daily\par \par RTO 3 months\par bib POON

## 2023-06-19 ENCOUNTER — RX RENEWAL (OUTPATIENT)
Age: 53
End: 2023-06-19

## 2023-06-22 ENCOUNTER — OUTPATIENT (OUTPATIENT)
Dept: OUTPATIENT SERVICES | Facility: HOSPITAL | Age: 53
LOS: 1 days | End: 2023-06-22
Payer: COMMERCIAL

## 2023-06-22 ENCOUNTER — RESULT REVIEW (OUTPATIENT)
Age: 53
End: 2023-06-22

## 2023-06-22 ENCOUNTER — APPOINTMENT (OUTPATIENT)
Dept: MAMMOGRAPHY | Facility: CLINIC | Age: 53
End: 2023-06-22
Payer: COMMERCIAL

## 2023-06-22 DIAGNOSIS — Z98.890 OTHER SPECIFIED POSTPROCEDURAL STATES: Chronic | ICD-10-CM

## 2023-06-22 DIAGNOSIS — Z00.8 ENCOUNTER FOR OTHER GENERAL EXAMINATION: ICD-10-CM

## 2023-06-22 PROCEDURE — 77065 DX MAMMO INCL CAD UNI: CPT | Mod: 26,LT

## 2023-06-22 PROCEDURE — 77065 DX MAMMO INCL CAD UNI: CPT

## 2023-07-05 ENCOUNTER — APPOINTMENT (OUTPATIENT)
Dept: HEMATOLOGY ONCOLOGY | Facility: CLINIC | Age: 53
End: 2023-07-05
Payer: COMMERCIAL

## 2023-07-12 ENCOUNTER — APPOINTMENT (OUTPATIENT)
Dept: MRI IMAGING | Facility: CLINIC | Age: 53
End: 2023-07-12

## 2023-07-24 ENCOUNTER — LABORATORY RESULT (OUTPATIENT)
Age: 53
End: 2023-07-24

## 2023-07-25 LAB
APTT BLD: 34.9 SEC
FACT VIII ACT/NOR PPP: 172 %
FERRITIN SERPL-MCNC: 16 NG/ML
FOLATE SERPL-MCNC: 18.7 NG/ML
INR PPP: 1.09 RATIO
IRON SATN MFR SERPL: 4 %
IRON SERPL-MCNC: 17 UG/DL
PT BLD: 12.6 SEC
RBC # BLD: 4.42 M/UL
RETICS # AUTO: 3.2 %
RETICS AGGREG/RBC NFR: 139.2 K/UL
TIBC SERPL-MCNC: 454 UG/DL
UIBC SERPL-MCNC: 437 UG/DL
VIT B12 SERPL-MCNC: 476 PG/ML
VWF AG PPP IA-ACNC: 151 %
VWF:RCO ACT/NOR PPP PL AGG: 127 %

## 2023-07-26 ENCOUNTER — APPOINTMENT (OUTPATIENT)
Dept: HEMATOLOGY ONCOLOGY | Facility: CLINIC | Age: 53
End: 2023-07-26
Payer: COMMERCIAL

## 2023-07-26 PROCEDURE — 99213 OFFICE O/P EST LOW 20 MIN: CPT | Mod: 95

## 2023-07-26 NOTE — PHYSICAL EXAM
[de-identified] : non-toxic appearing [de-identified] : breathing appeared unlabored [de-identified] : coloration appeared normal

## 2023-07-26 NOTE — ASSESSMENT
[FreeTextEntry1] : Lab work, 6/16/23 GYN note reviewed.\par #1) Chronic, intermittent leukocytosis/thrombocytosis dating back at least to 9/2019 upon review of old lab work available. H/O chronically intermittently elevated hematocrit noted as well. Suspect may have reactive component to elevated Hct. in this patient with h/o sleep apnea, and elevated erythropoietin. Intermittent abnormalities suggestive of benign processes currently. CASE 2 mutation studies, BCR-ABL unremarkable 7/2021.\par Continue to monitor CBC with differential.\par \par #2) anemia/h/o decreased iron suspect associated with menstrual blood loss/abnormal vaginal bleeding-exacerbated now. Reviewed iron treatment options-can increase PO iron supplement with short interval f/u lab work to be done, vs. proceeding with parenteral iron-potential side effects of IV Venofer explained including but not limited to allergic reaction, skin hyperpigmentation.  Patient stated she wishes to increase her p.o. iron supplement for now, and will take it more consistently.  If refractory iron deficiency, she is willing to consider the parenteral iron. May need t/c f/u GI evaluation as well (last colonoscopy 9/2022). \par GYN f/u as planned.\par \par #3) h/o elevated PTT-negative LA screen. Corrected with 50:50 mixing study with no factor 8, 9, 11, 12 deficiency. VW Ag/activity WNL 10/2021. \par No FH of a bleeding disorder, and patient has denied any personal h/o abnormal dar-procedural/operative bleeding. Follow-up PTT WNL 7/24/2023. Follow clinically.\par \par #3) PO vitamin B 12 for h/o a vitamin B 12 level <400.\par \par If hematologic scenario worsens/changes, can decide if prudent to pursue further evaluation/BMB to r/o underlying, evolving BM disorder/MPD.\par \par Patient was given the opportunity to ask questions, and her questions have been answered to her apparent satisfaction at this time. She is agreeable to recommended f/u.\par

## 2023-07-26 NOTE — HISTORY OF PRESENT ILLNESS
[de-identified] : 7/2021-Patient presented at the request of her PCP for leukocytosis, found on routine lab work by PCP-goes for lab work Q 3 months for Hemoglobin A1C. Also noted intermittent elevation of RBC. Erythropoietin level elevated at 43. CASE 2 mutation studies negative. BCR/ABL not detected. Has a h/o sleep apnea.\par \par \par \par \par  [de-identified] : Is seeing breast surgeon Dr. Cotton for recent mammogram/breast MRI findings. Has targeted US planned.\par Saw GYN MD-recommended possible ablation vs. hormone therapy.\par Menstrual bleeding is overall decreased.\par Has sleep apnea-does not use CPAP machine-had difficulty using it.\par No current c/o CP, SOB, N/V/D, change in bowel/bladder habits. No fevers, abnormal bruising. No c/o H/A, pruritis.\par

## 2023-07-26 NOTE — RESULTS/DATA
[FreeTextEntry1] : 6/2023–mammogram–probably benign calcifications at site of surgical scar in the retroareolar left breast for which a 6-month follow-up diagnostic left breast mammogram is recommended.  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  \par 7/18/2023–breast MRI–indeterminate left retroareolar mass measuring 1 cm.  Targeted ultrasound can be obtained for further evaluation.  If there is no sonographic correlate, MRI guided biopsy could be attempted.

## 2023-07-27 LAB — FIBRINOGEN AG PPP IA-MCNC: 523 MG/DL

## 2023-08-07 ENCOUNTER — RX RENEWAL (OUTPATIENT)
Age: 53
End: 2023-08-07

## 2023-08-14 ENCOUNTER — NON-APPOINTMENT (OUTPATIENT)
Age: 53
End: 2023-08-14

## 2023-08-14 ENCOUNTER — LABORATORY RESULT (OUTPATIENT)
Age: 53
End: 2023-08-14

## 2023-08-15 LAB
FERRITIN SERPL-MCNC: 27 NG/ML
FOLATE SERPL-MCNC: 13.3 NG/ML
HAPTOGLOB SERPL-MCNC: 213 MG/DL
HBA1C MFR BLD HPLC: 7.1
IRON SATN MFR SERPL: 17 %
IRON SERPL-MCNC: 73 UG/DL
RBC # BLD: 3.51 M/UL
RETICS # AUTO: 6.4 %
RETICS AGGREG/RBC NFR: 223.2 K/UL
TIBC SERPL-MCNC: 432 UG/DL
UIBC SERPL-MCNC: 360 UG/DL
VIT B12 SERPL-MCNC: 425 PG/ML

## 2023-08-23 ENCOUNTER — APPOINTMENT (OUTPATIENT)
Dept: OBGYN | Facility: CLINIC | Age: 53
End: 2023-08-23
Payer: COMMERCIAL

## 2023-08-23 VITALS
DIASTOLIC BLOOD PRESSURE: 76 MMHG | HEIGHT: 66 IN | BODY MASS INDEX: 37.93 KG/M2 | HEART RATE: 92 BPM | WEIGHT: 236 LBS | SYSTOLIC BLOOD PRESSURE: 122 MMHG

## 2023-08-23 DIAGNOSIS — N60.99 UNSPECIFIED BENIGN MAMMARY DYSPLASIA OF UNSPECIFIED BREAST: ICD-10-CM

## 2023-08-23 DIAGNOSIS — N92.6 IRREGULAR MENSTRUATION, UNSPECIFIED: ICD-10-CM

## 2023-08-23 PROCEDURE — 99213 OFFICE O/P EST LOW 20 MIN: CPT | Mod: 25

## 2023-08-23 PROCEDURE — 58100 BIOPSY OF UTERUS LINING: CPT

## 2023-08-23 RX ORDER — TRANEXAMIC ACID 650 MG/1
650 TABLET ORAL EVERY 8 HOURS
Qty: 30 | Refills: 4 | Status: ACTIVE | COMMUNITY
Start: 2023-08-23 | End: 1900-01-01

## 2023-08-23 NOTE — PHYSICAL EXAM
[Chaperone Present] : A chaperone was present in the examining room during all aspects of the physical examination [Appropriately responsive] : appropriately responsive [Alert] : alert [No Acute Distress] : no acute distress [No Lymphadenopathy] : no lymphadenopathy [Soft] : soft [Non-tender] : non-tender [Non-distended] : non-distended [No HSM] : No HSM [No Lesions] : no lesions [No Mass] : no mass [Oriented x3] : oriented x3 [Labia Majora] : normal [Labia Minora] : normal [Moderate] : There was moderate vaginal bleeding [Normal] : normal [Uterine Adnexae] : non-palpable

## 2023-08-23 NOTE — PLAN
[FreeTextEntry1] : Differential diagnosis, work up ,management and evaluation of above mentioned concerns extensively reviewed Extensive review of above mentioned concerns. All questions and concerns addressed, patient expressed understanding. Encouraged to contact the office with any questions or concerns.

## 2023-08-28 ENCOUNTER — OUTPATIENT (OUTPATIENT)
Dept: OUTPATIENT SERVICES | Facility: HOSPITAL | Age: 53
LOS: 1 days | Discharge: HOME | End: 2023-08-28

## 2023-08-28 ENCOUNTER — LABORATORY RESULT (OUTPATIENT)
Age: 53
End: 2023-08-28

## 2023-08-28 DIAGNOSIS — Z98.890 OTHER SPECIFIED POSTPROCEDURAL STATES: Chronic | ICD-10-CM

## 2023-08-28 DIAGNOSIS — D72.829 ELEVATED WHITE BLOOD CELL COUNT, UNSPECIFIED: ICD-10-CM

## 2023-08-29 ENCOUNTER — APPOINTMENT (OUTPATIENT)
Dept: HEMATOLOGY ONCOLOGY | Facility: CLINIC | Age: 53
End: 2023-08-29
Payer: COMMERCIAL

## 2023-08-29 LAB
FERRITIN SERPL-MCNC: 30 NG/ML
FOLATE SERPL-MCNC: 14.7 NG/ML
HAPTOGLOB SERPL-MCNC: 214 MG/DL
IRON SATN MFR SERPL: 68 %
IRON SERPL-MCNC: 274 UG/DL
RBC # BLD: 3.57 M/UL
RETICS # AUTO: 6.5 %
RETICS AGGREG/RBC NFR: 230.6 K/UL
TIBC SERPL-MCNC: 405 UG/DL
UIBC SERPL-MCNC: 131 UG/DL
VIT B12 SERPL-MCNC: 361 PG/ML

## 2023-08-29 PROCEDURE — 99213 OFFICE O/P EST LOW 20 MIN: CPT | Mod: 95

## 2023-08-29 NOTE — ASSESSMENT
[FreeTextEntry1] : Lab work, 8/23/23 GYN note reviewed. #1) Chronic, intermittent leukocytosis/thrombocytosis dating back at least to 9/2019 upon review of old lab work available. H/O chronically intermittently elevated hematocrit noted as well. Suspect may have reactive component to elevated Hct. in this patient with h/o sleep apnea, and elevated erythropoietin. Intermittent abnormalities suggestive of benign processes currently. CASE 2 mutation studies, BCR-ABL unremarkable 7/2021. Continue to monitor CBC with differential.  #2) anemia/h/o decreased iron suspect associated with menstrual blood loss/abnormal vaginal bleeding-exacerbated now. Reviewed iron treatment options-most recent hemoglobin available (8/28/2023) increased compared to 8/14/2023.  Patient feels she is tolerating p.o. iron right now, and wishes to continue it. Interval follow-up lab work to be done. GYN f/u as planned.  #3) h/o elevated PTT-negative LA screen. Corrected with 50:50 mixing study with no factor 8, 9, 11, 12 deficiency. VW Ag/activity WNL 10/2021.  No FH of a bleeding disorder, and patient has denied any personal h/o abnormal dar-procedural/operative bleeding. Follow-up PTT WNL 7/24/2023. Follow clinically.  #3) PO vitamin B 12 for h/o a vitamin B 12 level <400.  If hematologic scenario worsens/changes, can decide if prudent to pursue further evaluation/BMB to r/o underlying, evolving BM disorder/MPD.  Patient was given the opportunity to ask questions, and her questions have been answered to her apparent satisfaction at this time. She is agreeable to recommended f/u.  -->RTO 6 weeks.

## 2023-08-29 NOTE — HISTORY OF PRESENT ILLNESS
[de-identified] : 7/2021-Patient presented at the request of her PCP for leukocytosis, found on routine lab work by PCP-goes for lab work Q 3 months for Hemoglobin A1C. Also noted intermittent elevation of RBC. Erythropoietin level elevated at 43. CASE 2 mutation studies negative. BCR/ABL not detected. Has a h/o sleep apnea.\par  \par  \par  \par  \par   [de-identified] : Taking the PO iron TID-feels she is tolerating well.  Menstrual bleeding recently heavy x 25-30 days. Had endometrial biopsy with path. pnd. Thinking about hysterectomy at this point. Has sleep apnea-does not use CPAP machine-had difficulty using it. No current c/o CP, SOB, N/V/D, change in bowel/bladder habits. No fevers, abnormal bruising. No c/o H/A, pruritis.

## 2023-08-29 NOTE — REASON FOR VISIT
[Follow-Up Visit] : a follow-up visit for [Home] : at home, [unfilled] , at the time of the visit. [Medical Office: ([facility])___] : at the medical office located in  [Patient] : the patient [Self] : self [FreeTextEntry2] : leukocytosis

## 2023-08-29 NOTE — PHYSICAL EXAM
[Normal] : affect appropriate [de-identified] : non-toxic appearing [de-identified] : breathing appeared unlabored [de-identified] : coloration appeared normal

## 2023-09-05 ENCOUNTER — TRANSCRIPTION ENCOUNTER (OUTPATIENT)
Age: 53
End: 2023-09-05

## 2023-09-05 ENCOUNTER — APPOINTMENT (OUTPATIENT)
Dept: OBGYN | Facility: CLINIC | Age: 53
End: 2023-09-05

## 2023-09-07 ENCOUNTER — NON-APPOINTMENT (OUTPATIENT)
Age: 53
End: 2023-09-07

## 2023-09-07 LAB — CORE LAB BIOPSY: NORMAL

## 2023-10-03 ENCOUNTER — APPOINTMENT (OUTPATIENT)
Dept: ULTRASOUND IMAGING | Facility: CLINIC | Age: 53
End: 2023-10-03
Payer: COMMERCIAL

## 2023-10-03 ENCOUNTER — OUTPATIENT (OUTPATIENT)
Dept: OUTPATIENT SERVICES | Facility: HOSPITAL | Age: 53
LOS: 1 days | End: 2023-10-03
Payer: COMMERCIAL

## 2023-10-03 DIAGNOSIS — Z98.890 OTHER SPECIFIED POSTPROCEDURAL STATES: Chronic | ICD-10-CM

## 2023-10-03 DIAGNOSIS — N93.9 ABNORMAL UTERINE AND VAGINAL BLEEDING, UNSPECIFIED: ICD-10-CM

## 2023-10-03 PROCEDURE — 76830 TRANSVAGINAL US NON-OB: CPT | Mod: 26

## 2023-10-03 PROCEDURE — 76856 US EXAM PELVIC COMPLETE: CPT | Mod: 26

## 2023-10-03 PROCEDURE — 76856 US EXAM PELVIC COMPLETE: CPT

## 2023-10-03 PROCEDURE — 76830 TRANSVAGINAL US NON-OB: CPT

## 2023-10-09 LAB
FERRITIN SERPL-MCNC: 26 NG/ML
FOLATE SERPL-MCNC: 17.1 NG/ML
IRON SATN MFR SERPL: 41 %
IRON SERPL-MCNC: 167 UG/DL
RBC # BLD: 4.53 M/UL
RETICS # AUTO: 2.3 %
RETICS AGGREG/RBC NFR: 104.2 K/UL
TIBC SERPL-MCNC: 402 UG/DL
UIBC SERPL-MCNC: 236 UG/DL
VIT B12 SERPL-MCNC: 380 PG/ML

## 2023-10-10 ENCOUNTER — APPOINTMENT (OUTPATIENT)
Dept: HEMATOLOGY ONCOLOGY | Facility: CLINIC | Age: 53
End: 2023-10-10
Payer: COMMERCIAL

## 2023-10-10 PROCEDURE — 99213 OFFICE O/P EST LOW 20 MIN: CPT | Mod: 95

## 2023-10-30 ENCOUNTER — APPOINTMENT (OUTPATIENT)
Dept: OBGYN | Facility: CLINIC | Age: 53
End: 2023-10-30
Payer: COMMERCIAL

## 2023-10-30 PROCEDURE — 99213 OFFICE O/P EST LOW 20 MIN: CPT | Mod: 95

## 2023-11-03 ENCOUNTER — RX RENEWAL (OUTPATIENT)
Age: 53
End: 2023-11-03

## 2023-11-03 RX ORDER — ROSUVASTATIN CALCIUM 5 MG/1
5 TABLET, FILM COATED ORAL DAILY
Qty: 30 | Refills: 0 | Status: ACTIVE | COMMUNITY
Start: 2023-05-09 | End: 1900-01-01

## 2023-11-09 ENCOUNTER — RX RENEWAL (OUTPATIENT)
Age: 53
End: 2023-11-09

## 2023-11-14 ENCOUNTER — APPOINTMENT (OUTPATIENT)
Dept: INTERNAL MEDICINE | Facility: CLINIC | Age: 53
End: 2023-11-14
Payer: COMMERCIAL

## 2023-11-14 ENCOUNTER — MED ADMIN CHARGE (OUTPATIENT)
Age: 53
End: 2023-11-14

## 2023-11-14 ENCOUNTER — OUTPATIENT (OUTPATIENT)
Dept: OUTPATIENT SERVICES | Facility: HOSPITAL | Age: 53
LOS: 1 days | End: 2023-11-14
Payer: COMMERCIAL

## 2023-11-14 VITALS
HEIGHT: 64.5 IN | WEIGHT: 240.97 LBS | RESPIRATION RATE: 16 BRPM | HEART RATE: 91 BPM | SYSTOLIC BLOOD PRESSURE: 130 MMHG | OXYGEN SATURATION: 100 % | DIASTOLIC BLOOD PRESSURE: 70 MMHG | TEMPERATURE: 98 F

## 2023-11-14 VITALS
HEIGHT: 66 IN | BODY MASS INDEX: 37.93 KG/M2 | OXYGEN SATURATION: 98 % | SYSTOLIC BLOOD PRESSURE: 135 MMHG | WEIGHT: 236 LBS | DIASTOLIC BLOOD PRESSURE: 80 MMHG | TEMPERATURE: 96.2 F | RESPIRATION RATE: 17 BRPM | HEART RATE: 102 BPM

## 2023-11-14 DIAGNOSIS — N93.9 ABNORMAL UTERINE AND VAGINAL BLEEDING, UNSPECIFIED: ICD-10-CM

## 2023-11-14 DIAGNOSIS — N92.6 IRREGULAR MENSTRUATION, UNSPECIFIED: ICD-10-CM

## 2023-11-14 DIAGNOSIS — E11.9 TYPE 2 DIABETES MELLITUS WITHOUT COMPLICATIONS: ICD-10-CM

## 2023-11-14 DIAGNOSIS — B37.9 CANDIDIASIS, UNSPECIFIED: ICD-10-CM

## 2023-11-14 DIAGNOSIS — Z98.890 OTHER SPECIFIED POSTPROCEDURAL STATES: Chronic | ICD-10-CM

## 2023-11-14 DIAGNOSIS — I10 ESSENTIAL (PRIMARY) HYPERTENSION: ICD-10-CM

## 2023-11-14 DIAGNOSIS — R05.9 COUGH, UNSPECIFIED: ICD-10-CM

## 2023-11-14 DIAGNOSIS — E11.9 TYPE 2 DIABETES MELLITUS W/OUT COMPLICATIONS: ICD-10-CM

## 2023-11-14 DIAGNOSIS — Z23 ENCOUNTER FOR IMMUNIZATION: ICD-10-CM

## 2023-11-14 DIAGNOSIS — R93.89 ABNORMAL FINDINGS ON DIAGNOSTIC IMAGING OF OTHER SPECIFIED BODY STRUCTURES: ICD-10-CM

## 2023-11-14 DIAGNOSIS — G47.33 OBSTRUCTIVE SLEEP APNEA (ADULT) (PEDIATRIC): ICD-10-CM

## 2023-11-14 LAB
A1C WITH ESTIMATED AVERAGE GLUCOSE RESULT: 6.9 % — HIGH (ref 4–5.6)
A1C WITH ESTIMATED AVERAGE GLUCOSE RESULT: 6.9 % — HIGH (ref 4–5.6)
ANION GAP SERPL CALC-SCNC: 11 MMOL/L — SIGNIFICANT CHANGE UP (ref 7–14)
ANION GAP SERPL CALC-SCNC: 11 MMOL/L — SIGNIFICANT CHANGE UP (ref 7–14)
BUN SERPL-MCNC: 10 MG/DL — SIGNIFICANT CHANGE UP (ref 7–23)
BUN SERPL-MCNC: 10 MG/DL — SIGNIFICANT CHANGE UP (ref 7–23)
CALCIUM SERPL-MCNC: 9.1 MG/DL — SIGNIFICANT CHANGE UP (ref 8.4–10.5)
CALCIUM SERPL-MCNC: 9.1 MG/DL — SIGNIFICANT CHANGE UP (ref 8.4–10.5)
CHLORIDE SERPL-SCNC: 102 MMOL/L — SIGNIFICANT CHANGE UP (ref 98–107)
CHLORIDE SERPL-SCNC: 102 MMOL/L — SIGNIFICANT CHANGE UP (ref 98–107)
CO2 SERPL-SCNC: 25 MMOL/L — SIGNIFICANT CHANGE UP (ref 22–31)
CO2 SERPL-SCNC: 25 MMOL/L — SIGNIFICANT CHANGE UP (ref 22–31)
CREAT SERPL-MCNC: 0.58 MG/DL — SIGNIFICANT CHANGE UP (ref 0.5–1.3)
CREAT SERPL-MCNC: 0.58 MG/DL — SIGNIFICANT CHANGE UP (ref 0.5–1.3)
EGFR: 107 ML/MIN/1.73M2 — SIGNIFICANT CHANGE UP
EGFR: 107 ML/MIN/1.73M2 — SIGNIFICANT CHANGE UP
ESTIMATED AVERAGE GLUCOSE: 151 — SIGNIFICANT CHANGE UP
ESTIMATED AVERAGE GLUCOSE: 151 — SIGNIFICANT CHANGE UP
GLUCOSE SERPL-MCNC: 131 MG/DL — HIGH (ref 70–99)
GLUCOSE SERPL-MCNC: 131 MG/DL — HIGH (ref 70–99)
HCG SERPL-ACNC: 1.3 MIU/ML — SIGNIFICANT CHANGE UP
HCG SERPL-ACNC: 1.3 MIU/ML — SIGNIFICANT CHANGE UP
HCT VFR BLD CALC: 27.7 % — LOW (ref 34.5–45)
HCT VFR BLD CALC: 27.7 % — LOW (ref 34.5–45)
HGB BLD-MCNC: 7.9 G/DL — LOW (ref 11.5–15.5)
HGB BLD-MCNC: 7.9 G/DL — LOW (ref 11.5–15.5)
MCHC RBC-ENTMCNC: 26.7 PG — LOW (ref 27–34)
MCHC RBC-ENTMCNC: 26.7 PG — LOW (ref 27–34)
MCHC RBC-ENTMCNC: 28.5 GM/DL — LOW (ref 32–36)
MCHC RBC-ENTMCNC: 28.5 GM/DL — LOW (ref 32–36)
MCV RBC AUTO: 93.6 FL — SIGNIFICANT CHANGE UP (ref 80–100)
MCV RBC AUTO: 93.6 FL — SIGNIFICANT CHANGE UP (ref 80–100)
NRBC # BLD: 0 /100 WBCS — SIGNIFICANT CHANGE UP (ref 0–0)
NRBC # BLD: 0 /100 WBCS — SIGNIFICANT CHANGE UP (ref 0–0)
NRBC # FLD: 0.02 K/UL — HIGH (ref 0–0)
NRBC # FLD: 0.02 K/UL — HIGH (ref 0–0)
PLATELET # BLD AUTO: 400 K/UL — SIGNIFICANT CHANGE UP (ref 150–400)
PLATELET # BLD AUTO: 400 K/UL — SIGNIFICANT CHANGE UP (ref 150–400)
POTASSIUM SERPL-MCNC: 4.4 MMOL/L — SIGNIFICANT CHANGE UP (ref 3.5–5.3)
POTASSIUM SERPL-MCNC: 4.4 MMOL/L — SIGNIFICANT CHANGE UP (ref 3.5–5.3)
POTASSIUM SERPL-SCNC: 4.4 MMOL/L — SIGNIFICANT CHANGE UP (ref 3.5–5.3)
POTASSIUM SERPL-SCNC: 4.4 MMOL/L — SIGNIFICANT CHANGE UP (ref 3.5–5.3)
RBC # BLD: 2.96 M/UL — LOW (ref 3.8–5.2)
RBC # BLD: 2.96 M/UL — LOW (ref 3.8–5.2)
RBC # FLD: 19.4 % — HIGH (ref 10.3–14.5)
RBC # FLD: 19.4 % — HIGH (ref 10.3–14.5)
SODIUM SERPL-SCNC: 138 MMOL/L — SIGNIFICANT CHANGE UP (ref 135–145)
SODIUM SERPL-SCNC: 138 MMOL/L — SIGNIFICANT CHANGE UP (ref 135–145)
WBC # BLD: 10.81 K/UL — HIGH (ref 3.8–10.5)
WBC # BLD: 10.81 K/UL — HIGH (ref 3.8–10.5)
WBC # FLD AUTO: 10.81 K/UL — HIGH (ref 3.8–10.5)
WBC # FLD AUTO: 10.81 K/UL — HIGH (ref 3.8–10.5)

## 2023-11-14 PROCEDURE — 93010 ELECTROCARDIOGRAM REPORT: CPT

## 2023-11-14 PROCEDURE — 90686 IIV4 VACC NO PRSV 0.5 ML IM: CPT

## 2023-11-14 PROCEDURE — G0008: CPT

## 2023-11-14 PROCEDURE — 99214 OFFICE O/P EST MOD 30 MIN: CPT | Mod: 25

## 2023-11-14 RX ORDER — FLASH GLUCOSE SENSOR
KIT MISCELLANEOUS
Qty: 6 | Refills: 2 | Status: COMPLETED | COMMUNITY
Start: 2022-09-13 | End: 2023-11-07

## 2023-11-14 RX ORDER — DEXTROSE 50 % IN WATER 50 %
15 SYRINGE (ML) INTRAVENOUS ONCE
Refills: 0 | Status: DISCONTINUED | OUTPATIENT
Start: 2023-11-28 | End: 2023-12-12

## 2023-11-14 RX ORDER — GLIMEPIRIDE 1 MG
1 TABLET ORAL
Qty: 0 | Refills: 0 | DISCHARGE

## 2023-11-14 RX ORDER — GLUCAGON INJECTION, SOLUTION 0.5 MG/.1ML
1 INJECTION, SOLUTION SUBCUTANEOUS ONCE
Refills: 0 | Status: DISCONTINUED | OUTPATIENT
Start: 2023-11-28 | End: 2023-12-12

## 2023-11-14 RX ORDER — DEXTROSE 50 % IN WATER 50 %
25 SYRINGE (ML) INTRAVENOUS ONCE
Refills: 0 | Status: DISCONTINUED | OUTPATIENT
Start: 2023-11-28 | End: 2023-12-12

## 2023-11-14 RX ORDER — FLUCONAZOLE 150 MG/1
150 TABLET ORAL
Qty: 3 | Refills: 0 | Status: COMPLETED | COMMUNITY
Start: 2023-06-07 | End: 2023-07-12

## 2023-11-14 RX ORDER — ALPRAZOLAM 0.5 MG/1
0.5 TABLET ORAL
Qty: 1 | Refills: 0 | Status: COMPLETED | COMMUNITY
Start: 2021-06-18 | End: 2023-07-27

## 2023-11-14 RX ORDER — SODIUM CHLORIDE 9 MG/ML
1000 INJECTION, SOLUTION INTRAVENOUS
Refills: 0 | Status: DISCONTINUED | OUTPATIENT
Start: 2023-11-28 | End: 2023-12-12

## 2023-11-14 RX ORDER — BLOOD-GLUCOSE SENSOR
EACH MISCELLANEOUS
Qty: 2 | Refills: 1 | Status: COMPLETED | COMMUNITY
Start: 2023-05-23 | End: 2023-11-01

## 2023-11-14 RX ORDER — DULAGLUTIDE 4.5 MG/.5ML
1 INJECTION, SOLUTION SUBCUTANEOUS
Qty: 0 | Refills: 0 | DISCHARGE

## 2023-11-14 RX ORDER — (INSULIN DEGLUDEC AND LIRAGLUTIDE) 100; 3.6 [IU]/ML; MG/ML
100-3.6 INJECTION, SOLUTION SUBCUTANEOUS
Qty: 3 | Refills: 5 | Status: ACTIVE | COMMUNITY
Start: 2020-01-13 | End: 1900-01-01

## 2023-11-14 RX ORDER — IBUPROFEN 200 MG
1 TABLET ORAL
Qty: 0 | Refills: 0 | DISCHARGE

## 2023-11-14 RX ORDER — SITAGLIPTIN 100 MG/1
100 TABLET, FILM COATED ORAL DAILY
Qty: 90 | Refills: 0 | Status: COMPLETED | COMMUNITY
Start: 2023-06-07 | End: 2023-07-04

## 2023-11-14 RX ORDER — FLUCONAZOLE 150 MG/1
150 TABLET ORAL
Qty: 2 | Refills: 1 | Status: COMPLETED | COMMUNITY
Start: 2022-02-09 | End: 2023-08-08

## 2023-11-14 RX ORDER — DAPAGLIFLOZIN 10 MG/1
1 TABLET, FILM COATED ORAL
Qty: 0 | Refills: 0 | DISCHARGE

## 2023-11-14 RX ORDER — DEXTROSE 50 % IN WATER 50 %
12.5 SYRINGE (ML) INTRAVENOUS ONCE
Refills: 0 | Status: DISCONTINUED | OUTPATIENT
Start: 2023-11-28 | End: 2023-12-12

## 2023-11-14 RX ORDER — (INSULIN DEGLUDEC AND LIRAGLUTIDE) 100; 3.6 [IU]/ML; MG/ML
12 INJECTION, SOLUTION SUBCUTANEOUS
Qty: 0 | Refills: 0 | DISCHARGE

## 2023-11-14 NOTE — H&P PST ADULT - PROBLEM SELECTOR PLAN 1
Patient tentatively scheduled for dilation curettage hysteroscopy symphion for 11/28/23. Pre-op instructions provided. Pt given verbal and written instructions with teach back on pepcid. Pt verbalized understanding with return demonstration.     CBC BMP A1C HCG EKG done    Copy of medical evaluation and echo 2019 in chart.

## 2023-11-14 NOTE — H&P PST ADULT - NSICDXPASTSURGICALHX_GEN_ALL_CORE_FT
PAST SURGICAL HISTORY:  H/O benign breast biopsy     H/O umbilical hernia repair     S/P anal fissurectomy 10 yrs ago    S/P  section twice, ,     S/P lumpectomy, left breast

## 2023-11-14 NOTE — H&P PST ADULT - PROBLEM SELECTOR PLAN 3
Pt is on Xultophy which is a combo and includes a GLP-1RA.  Pt advised to hold the evening prior to surgery dose.

## 2023-11-14 NOTE — H&P PST ADULT - BMI (KG/M2)
Ambulatory pulse ox-     At rest- 97% P 72  Ambulating- 95% P 80     Myra Escalera RN  01/21/23 0724
40.7

## 2023-11-14 NOTE — H&P PST ADULT - HISTORY OF PRESENT ILLNESS
54 y/o Female PMH HTN HLD obesity LAURO (does not tolerate CPAP) T2DM anemia presents to presurgical testing with diagnosis of abnormal uterine and vaginal bleeding. Pt is scheduled for a  dilation curettage hysteroscopy symphion.

## 2023-11-14 NOTE — H&P PST ADULT - NS ATTEND AMEND GEN_ALL_CORE FT
Pt with AUB and aware pelvic exam under anesthesia with learners,D/C hysteroscopy,poss SYMPHION--aware R/B/A,side effects incldg but not limited to infection,bleeding,uterine perf. Ample time for questions provided

## 2023-11-14 NOTE — H&P PST ADULT - NSICDXPASTMEDICALHX_GEN_ALL_CORE_FT
PAST MEDICAL HISTORY:  Abnormal uterine and vaginal bleeding     Anemia     Basal cell cancer removed upper back    Diabetes type 2    Gestational diabetes     HLD (hyperlipidemia)     HTN (hypertension)     Obesity     LAURO (obstructive sleep apnea)     Ovarian cyst

## 2023-11-27 ENCOUNTER — TRANSCRIPTION ENCOUNTER (OUTPATIENT)
Age: 53
End: 2023-11-27

## 2023-11-27 NOTE — ASU PATIENT PROFILE, ADULT - FALL HARM RISK - UNIVERSAL INTERVENTIONS
Bed in lowest position, wheels locked, appropriate side rails in place/Call bell, personal items and telephone in reach/Instruct patient to call for assistance before getting out of bed or chair/Non-slip footwear when patient is out of bed/[place] to call system/Physically safe environment - no spills, clutter or unnecessary equipment/Purposeful Proactive Rounding/Room/bathroom lighting operational, light cord in reach

## 2023-11-28 ENCOUNTER — APPOINTMENT (OUTPATIENT)
Dept: OBGYN | Facility: HOSPITAL | Age: 53
End: 2023-11-28

## 2023-11-28 ENCOUNTER — RESULT REVIEW (OUTPATIENT)
Age: 53
End: 2023-11-28

## 2023-11-28 ENCOUNTER — OUTPATIENT (OUTPATIENT)
Dept: OUTPATIENT SERVICES | Facility: HOSPITAL | Age: 53
LOS: 1 days | Discharge: HOME | End: 2023-11-28
Payer: COMMERCIAL

## 2023-11-28 ENCOUNTER — TRANSCRIPTION ENCOUNTER (OUTPATIENT)
Age: 53
End: 2023-11-28

## 2023-11-28 VITALS
HEART RATE: 80 BPM | TEMPERATURE: 98 F | WEIGHT: 240.97 LBS | SYSTOLIC BLOOD PRESSURE: 131 MMHG | RESPIRATION RATE: 16 BRPM | HEIGHT: 64.5 IN | DIASTOLIC BLOOD PRESSURE: 64 MMHG | OXYGEN SATURATION: 100 %

## 2023-11-28 VITALS
HEART RATE: 74 BPM | SYSTOLIC BLOOD PRESSURE: 110 MMHG | RESPIRATION RATE: 16 BRPM | OXYGEN SATURATION: 98 % | DIASTOLIC BLOOD PRESSURE: 58 MMHG

## 2023-11-28 DIAGNOSIS — Z98.890 OTHER SPECIFIED POSTPROCEDURAL STATES: Chronic | ICD-10-CM

## 2023-11-28 DIAGNOSIS — N93.9 ABNORMAL UTERINE AND VAGINAL BLEEDING, UNSPECIFIED: ICD-10-CM

## 2023-11-28 LAB
GLUCOSE BLDC GLUCOMTR-MCNC: 162 MG/DL — HIGH (ref 70–99)
GLUCOSE BLDC GLUCOMTR-MCNC: 162 MG/DL — HIGH (ref 70–99)
HCG UR QL: NEGATIVE — SIGNIFICANT CHANGE UP
HCG UR QL: NEGATIVE — SIGNIFICANT CHANGE UP

## 2023-11-28 PROCEDURE — 88305 TISSUE EXAM BY PATHOLOGIST: CPT | Mod: 26

## 2023-11-28 PROCEDURE — 58558 HYSTEROSCOPY BIOPSY: CPT | Mod: GC

## 2023-11-28 RX ORDER — ONDANSETRON 8 MG/1
4 TABLET, FILM COATED ORAL ONCE
Refills: 0 | Status: DISCONTINUED | OUTPATIENT
Start: 2023-11-28 | End: 2023-12-12

## 2023-11-28 RX ORDER — (INSULIN DEGLUDEC AND LIRAGLUTIDE) 100; 3.6 [IU]/ML; MG/ML
42 INJECTION, SOLUTION SUBCUTANEOUS
Refills: 0 | DISCHARGE

## 2023-11-28 RX ORDER — IBUPROFEN 200 MG
1 TABLET ORAL
Qty: 0 | Refills: 0 | DISCHARGE

## 2023-11-28 RX ORDER — FENTANYL CITRATE 50 UG/ML
25 INJECTION INTRAVENOUS
Refills: 0 | Status: DISCONTINUED | OUTPATIENT
Start: 2023-11-28 | End: 2023-11-28

## 2023-11-28 RX ORDER — ROSUVASTATIN CALCIUM 5 MG/1
1 TABLET ORAL
Refills: 0 | DISCHARGE

## 2023-11-28 RX ORDER — RAMIPRIL 5 MG
1 CAPSULE ORAL
Refills: 0 | DISCHARGE

## 2023-11-28 RX ORDER — ACETAMINOPHEN 500 MG
3 TABLET ORAL
Qty: 0 | Refills: 0 | DISCHARGE

## 2023-11-28 RX ORDER — FERROUS SULFATE 325(65) MG
0 TABLET ORAL
Refills: 0 | DISCHARGE

## 2023-11-28 NOTE — BRIEF OPERATIVE NOTE - OPERATION/FINDINGS
EUA: grossly normal appearing external genitalia, cervix, vagina.  Hysteroscopy: bilateral ostia visualized, wnl. Polyp/fibroid visualized on ** wall of uterus. Otherwise grossl normal appearing uterine cavity and endometrium. EUA: grossly normal appearing external genitalia, cervix, vagina. Anteverted, small, mobile uterus.   Hysteroscopy: bilateral ostia visualized, wnl. Polypoid tissue noted on anterior and posterior walls of uterus, removed with dilation and curettage. Otherwise grossl normal appearing uterine cavity and endometrium.

## 2023-11-28 NOTE — ASU DISCHARGE PLAN (ADULT/PEDIATRIC) - CARE PROVIDER_API CALL
Susan Gregory  Obstetrics and Gynecology  [address] [place] [address], [address]  [place] [address]  Phone: [phone]  Fax: [phone]  Follow Up Time: 2 months

## 2023-11-28 NOTE — ASU DISCHARGE PLAN (ADULT/PEDIATRIC) - NS MD DC FALL RISK RISK
Nursing Note by Reza Peterson CMA at 01/03/17 03:53 PM     Author:  Reza Peterson CMA Service:  (none) Author Type:  Certified Medical Assistant     Filed:  01/03/17 03:57 PM Encounter Date:  1/3/2017 Status:  Signed     :  Reza Peterson CMA (Certified Medical Assistant)            Following the last injection, did any of the following last longer than 24 hours?[AB1.1T] None[AB1.1M]     Has the patient receiving the injection today used the following in the past week:[AB1.1T] None[AB1.1M]     Within the past week, has the patient receiving the injection today experienced the following:[AB1.1T] None[AB1.1M]             Revision History        User Key Date/Time User Provider Type Action    > AB1.1 01/03/17 03:57 PM Reza Peterson CMA Certified Medical Assistant Sign    M - Manual, T - Template             For information on Fall & Injury Prevention, visit: [email or web address] OR  [email or web address] OR  https://www.cdc.gov/steadi/patient.html

## 2023-12-05 ENCOUNTER — RX CHANGE (OUTPATIENT)
Age: 53
End: 2023-12-05

## 2023-12-05 ENCOUNTER — TRANSCRIPTION ENCOUNTER (OUTPATIENT)
Age: 53
End: 2023-12-05

## 2023-12-05 LAB
SURGICAL PATHOLOGY STUDY: SIGNIFICANT CHANGE UP
SURGICAL PATHOLOGY STUDY: SIGNIFICANT CHANGE UP

## 2023-12-26 ENCOUNTER — OUTPATIENT (OUTPATIENT)
Dept: OUTPATIENT SERVICES | Facility: HOSPITAL | Age: 53
LOS: 1 days | Discharge: HOME | End: 2023-12-26

## 2023-12-26 DIAGNOSIS — D72.829 ELEVATED WHITE BLOOD CELL COUNT, UNSPECIFIED: ICD-10-CM

## 2023-12-26 DIAGNOSIS — Z98.890 OTHER SPECIFIED POSTPROCEDURAL STATES: Chronic | ICD-10-CM

## 2023-12-26 PROBLEM — G47.33 OBSTRUCTIVE SLEEP APNEA (ADULT) (PEDIATRIC): Chronic | Status: ACTIVE | Noted: 2023-11-14

## 2023-12-26 PROBLEM — E78.5 HYPERLIPIDEMIA, UNSPECIFIED: Chronic | Status: ACTIVE | Noted: 2023-11-14

## 2023-12-26 PROBLEM — I10 ESSENTIAL (PRIMARY) HYPERTENSION: Chronic | Status: ACTIVE | Noted: 2023-11-14

## 2023-12-26 PROBLEM — N93.9 ABNORMAL UTERINE AND VAGINAL BLEEDING, UNSPECIFIED: Chronic | Status: ACTIVE | Noted: 2023-11-14

## 2023-12-26 PROBLEM — E66.9 OBESITY, UNSPECIFIED: Chronic | Status: ACTIVE | Noted: 2023-11-14

## 2024-01-09 ENCOUNTER — APPOINTMENT (OUTPATIENT)
Dept: HEMATOLOGY ONCOLOGY | Facility: CLINIC | Age: 54
End: 2024-01-09
Payer: COMMERCIAL

## 2024-01-23 LAB
BASOPHILS # BLD AUTO: 0.06 K/UL
BASOPHILS NFR BLD AUTO: 0.5 %
EOSINOPHIL # BLD AUTO: 0.17 K/UL
EOSINOPHIL NFR BLD AUTO: 1.4 %
HCT VFR BLD CALC: 30.5 %
HGB BLD-MCNC: 9.1 G/DL
IMM GRANULOCYTES NFR BLD AUTO: 1.3 %
LYMPHOCYTES # BLD AUTO: 2.5 K/UL
LYMPHOCYTES NFR BLD AUTO: 20.9 %
MAN DIFF?: NORMAL
MCHC RBC-ENTMCNC: 27.8 PG
MCHC RBC-ENTMCNC: 29.8 GM/DL
MCV RBC AUTO: 93.3 FL
MONOCYTES # BLD AUTO: 0.65 K/UL
MONOCYTES NFR BLD AUTO: 5.4 %
NEUTROPHILS # BLD AUTO: 8.41 K/UL
NEUTROPHILS NFR BLD AUTO: 70.5 %
PLATELET # BLD AUTO: 433 K/UL
RBC # BLD: 3.27 M/UL
RBC # FLD: 16.3 %
WBC # FLD AUTO: 11.94 K/UL

## 2024-01-24 ENCOUNTER — APPOINTMENT (OUTPATIENT)
Dept: HEMATOLOGY ONCOLOGY | Facility: CLINIC | Age: 54
End: 2024-01-24
Payer: COMMERCIAL

## 2024-01-24 DIAGNOSIS — R71.8 OTHER ABNORMALITY OF RED BLOOD CELLS: ICD-10-CM

## 2024-01-24 LAB
FERRITIN SERPL-MCNC: 32 NG/ML
FOLATE SERPL-MCNC: 19.9 NG/ML
IRON SATN MFR SERPL: 62 %
IRON SERPL-MCNC: 231 UG/DL
TIBC SERPL-MCNC: 372 UG/DL
UIBC SERPL-MCNC: 142 UG/DL
VIT B12 SERPL-MCNC: 472 PG/ML

## 2024-01-24 PROCEDURE — 99213 OFFICE O/P EST LOW 20 MIN: CPT | Mod: 95

## 2024-01-24 NOTE — HISTORY OF PRESENT ILLNESS
[de-identified] : 7/2021-Patient presented at the request of her PCP for leukocytosis, found on routine lab work by PCP-goes for lab work Q 3 months for Hemoglobin A1C. Also noted intermittent elevation of RBC. Erythropoietin level elevated at 43. CASE 2 mutation studies negative. BCR/ABL not detected. Has a h/o sleep apnea.\par  \par  \par  \par  \par   [de-identified] : 11/28/23 had D&C/biopsy-told negative--has GYN f/u in Feb. Thinking about hysterectomy, though patient hoping to avoid. +Vaginal bleeding for last 2 weeks, with clots.  . States GYN eval. has been unremarkable. Taking the PO iron TID-feels she is tolerating well.  Has sleep apnea-does not use CPAP machine-had difficulty using it. No current c/o CP, SOB, N/V/D, change in bowel/bladder habits. No fevers, abnormal bruising. No c/o H/A, pruritis.

## 2024-01-24 NOTE — ASSESSMENT
[FreeTextEntry1] : Lab work, 10/30/23 GYN note reviewed. #1) Chronic, intermittent leukocytosis/thrombocytosis dating back at least to 9/2019 upon review of old lab work available. H/O chronically intermittently elevated hematocrit noted as well. Suspect may have reactive component to elevated Hct. in this patient with h/o sleep apnea, and elevated erythropoietin. Intermittent abnormalities suggestive of benign processes currently (last WBC and platelet count available, WNL). CASE 2 mutation studies, BCR-ABL unremarkable 7/2021. --Continue to monitor CBC with differential. F/U lab work ordered.  #2) anemia/h/o decreased iron suspect associated with menstrual blood loss/abnormal vaginal bleeding. Have reviewed iron treatment options. Patient feels she is tolerating p.o. iron right now, and wishes to continue it. Follow-up lab work to be done. --continue GYN f/u.  #3) h/o elevated PTT-negative LA screen. Corrected with 50:50 mixing study with no factor 8, 9, 11, 12 deficiency. VW Ag/activity WNL 10/2021. No FH of a bleeding disorder, and patient has denied any personal h/o abnormal dar-procedural/operative bleeding. Follow-up PTT WNL 7/24/2023. Follow clinically.  #3) PO vitamin B 12 for h/o a vitamin B 12 level <400. Added MMA to next lab work-pending.  If hematologic scenario worsens/changes, can decide if prudent to pursue further evaluation/BMB to r/o underlying, evolving BM disorder/MPD.  Patient was given the opportunity to ask questions, and her questions have been answered to her apparent satisfaction at this time. She is agreeable to recommended f/u.  -->Ferrous sulfate 325 mg PO TID with vitamin C. Vitamin B 12-1 mg PO daily. RTO 6 weeks.

## 2024-01-24 NOTE — PHYSICAL EXAM
[de-identified] : non-toxic appearing [de-identified] : breathing appeared unlabored [de-identified] : coloration appeared normal

## 2024-01-25 LAB — PCA AB SER QL IF: NORMAL

## 2024-01-26 LAB — IF BLOCK AB SER QL: 1.1 AU/ML

## 2024-01-29 LAB — METHYLMALONATE SERPL-SCNC: 199 NMOL/L

## 2024-02-04 ENCOUNTER — RX RENEWAL (OUTPATIENT)
Age: 54
End: 2024-02-04

## 2024-02-04 RX ORDER — RAMIPRIL 5 MG/1
5 CAPSULE ORAL
Qty: 90 | Refills: 0 | Status: ACTIVE | COMMUNITY
Start: 2021-10-04 | End: 1900-01-01

## 2024-02-26 ENCOUNTER — OUTPATIENT (OUTPATIENT)
Dept: OUTPATIENT SERVICES | Facility: HOSPITAL | Age: 54
LOS: 1 days | Discharge: HOME | End: 2024-02-26

## 2024-02-26 DIAGNOSIS — Z98.890 OTHER SPECIFIED POSTPROCEDURAL STATES: Chronic | ICD-10-CM

## 2024-02-26 DIAGNOSIS — D72.829 ELEVATED WHITE BLOOD CELL COUNT, UNSPECIFIED: ICD-10-CM

## 2024-02-28 ENCOUNTER — TRANSCRIPTION ENCOUNTER (OUTPATIENT)
Age: 54
End: 2024-02-28

## 2024-02-28 ENCOUNTER — APPOINTMENT (OUTPATIENT)
Dept: OBGYN | Facility: CLINIC | Age: 54
End: 2024-02-28

## 2024-03-05 ENCOUNTER — APPOINTMENT (OUTPATIENT)
Dept: OBGYN | Facility: CLINIC | Age: 54
End: 2024-03-05
Payer: COMMERCIAL

## 2024-03-05 DIAGNOSIS — N95.1 MENOPAUSAL AND FEMALE CLIMACTERIC STATES: ICD-10-CM

## 2024-03-05 DIAGNOSIS — N93.9 ABNORMAL UTERINE AND VAGINAL BLEEDING, UNSPECIFIED: ICD-10-CM

## 2024-03-05 PROCEDURE — 99212 OFFICE O/P EST SF 10 MIN: CPT

## 2024-03-05 NOTE — HISTORY OF PRESENT ILLNESS
[FreeTextEntry1] : AUB--resolved since polypectomy--rev path perimenopausal disorder--stable at this time

## 2024-03-05 NOTE — DISCUSSION/SUMMARY
[FreeTextEntry1] : cont surveillance of menstrual cycle/bleeding pattern--improved as of this time Extensive review of above mentioned concerns. All questions and concerns addressed, patient expressed understanding. Encouraged to contact the office with any questions or concerns.

## 2024-03-05 NOTE — REASON FOR VISIT
[Home] : at home, [unfilled] , at the time of the visit. [Medical Office: ([facility])___] : at the medical office located in  [Patient] : the patient

## 2024-03-07 ENCOUNTER — APPOINTMENT (OUTPATIENT)
Dept: HEMATOLOGY ONCOLOGY | Facility: CLINIC | Age: 54
End: 2024-03-07
Payer: COMMERCIAL

## 2024-03-07 LAB
BASOPHILS # BLD AUTO: 0.03 K/UL
BASOPHILS NFR BLD AUTO: 0.3 %
EOSINOPHIL # BLD AUTO: 0.11 K/UL
EOSINOPHIL NFR BLD AUTO: 1.1 %
FERRITIN SERPL-MCNC: 26 NG/ML
FOLATE SERPL-MCNC: 18.4 NG/ML
HCT VFR BLD CALC: 39.8 %
HGB BLD-MCNC: 12.1 G/DL
IMM GRANULOCYTES NFR BLD AUTO: 0.2 %
IRON SATN MFR SERPL: 6 %
IRON SERPL-MCNC: 24 UG/DL
LYMPHOCYTES # BLD AUTO: 1.98 K/UL
LYMPHOCYTES NFR BLD AUTO: 19 %
MAN DIFF?: NORMAL
MCHC RBC-ENTMCNC: 27.3 PG
MCHC RBC-ENTMCNC: 30.4 GM/DL
MCV RBC AUTO: 89.6 FL
MONOCYTES # BLD AUTO: 0.56 K/UL
MONOCYTES NFR BLD AUTO: 5.4 %
NEUTROPHILS # BLD AUTO: 7.72 K/UL
NEUTROPHILS NFR BLD AUTO: 74 %
PLATELET # BLD AUTO: 405 K/UL
RBC # BLD: 4.44 M/UL
RBC # FLD: 15.9 %
TIBC SERPL-MCNC: 404 UG/DL
UIBC SERPL-MCNC: 380 UG/DL
VIT B12 SERPL-MCNC: 375 PG/ML
WBC # FLD AUTO: 10.42 K/UL

## 2024-03-07 PROCEDURE — 99213 OFFICE O/P EST LOW 20 MIN: CPT | Mod: 95

## 2024-03-07 NOTE — ASSESSMENT
[FreeTextEntry1] : Lab work, 3/5/24 GYN note reviewed. #1) Chronic, intermittent leukocytosis/thrombocytosis dating back at least to 9/2019 upon review of old lab work available. H/O chronically intermittently elevated hematocrit noted as well. Suspect may have reactive component to elevated Hct. in this patient with h/o sleep apnea, and elevated erythropoietin. Intermittent abnormalities suggestive of chronic benign processes currently. CASE 2 mutation studies, BCR-ABL unremarkable 7/2021. --Continue to monitor CBC with differential.   #2) anemia/h/o decreased iron suspect associated with menstrual blood loss/abnormal vaginal bleeding. Have reviewed iron treatment options. Patient feels she is tolerating p.o. iron right now, and wishes to continue it. --Interval follow-up lab work to be done. --continue GYN f/u.  #3) h/o elevated PTT-negative LA screen. Corrected with 50:50 mixing study with no factor 8, 9, 11, 12 deficiency. VW Ag/activity WNL 10/2021. No FH of a bleeding disorder, and patient has denied any personal h/o abnormal dar-procedural/operative bleeding. Follow-up PTT WNL 7/24/2023. Follow clinically.  #3) PO vitamin B 12 for h/o a vitamin B 12 level <400.   If hematologic scenario worsens/changes, can decide if prudent to pursue further evaluation/BMB to r/o underlying, evolving BM disorder/MPD.  Patient was given the opportunity to ask questions, and her questions have been answered to her apparent satisfaction at this time. She is agreeable to recommended f/u.  -->Ferrous sulfate 325 mg PO TID with vitamin C. Vitamin B 12-1 mg PO daily. RTO 8 weeks.

## 2024-03-07 NOTE — HISTORY OF PRESENT ILLNESS
[de-identified] : 7/2021-Patient presented at the request of her PCP for leukocytosis, found on routine lab work by PCP-goes for lab work Q 3 months for Hemoglobin A1C. Also noted intermittent elevation of RBC. Erythropoietin level elevated at 43. CASE 2 mutation studies negative. BCR/ABL not detected. Has a h/o sleep apnea.\par  \par  \par  \par  \par   [de-identified] : Overall, feels well. 11/28/23 Had D&C/biopsy-since then vaginal bleeding much less. Taking the PO iron TID-feels she is tolerating well.  Has sleep apnea-does not use CPAP machine-had difficulty using it. No current c/o CP, SOB, N/V/D, change in bowel/bladder habits. No fevers, abnormal bruising. No c/o H/A, pruritis.

## 2024-03-07 NOTE — PHYSICAL EXAM
[Normal] : affect appropriate [de-identified] : non-toxic appearing [de-identified] : coloration appeared normal [de-identified] : breathing appeared unlabored

## 2024-03-07 NOTE — REASON FOR VISIT
[Follow-Up Visit] : a follow-up visit for [Medical Office: ([facility])___] : at the medical office located in  [Home] : at home, [unfilled] , at the time of the visit. [Self] : self [Patient] : the patient [FreeTextEntry2] : leukocytosis

## 2024-03-07 NOTE — CONSULT LETTER
[Dear  ___] : Dear  [unfilled], [Courtesy Letter:] : I had the pleasure of seeing your patient, [unfilled], in my office today. [Consult Closing:] : Thank you very much for allowing me to participate in the care of this patient.  If you have any questions, please do not hesitate to contact me. [Please see my note below.] : Please see my note below. [Sincerely,] : Sincerely, [FreeTextEntry3] : Valentine Solomon MD

## 2024-03-12 LAB
HOMOCYSTEINE LEVEL: 8 UMOL/L
METHYLMALONATE SERPL-SCNC: 253 NMOL/L

## 2024-04-04 NOTE — PAST MEDICAL HISTORY
[Menstruating] : hx of menstruating [Definite ___ (Date)] : the last menstrual period was [unfilled] [Approximately ___ (Month)] : the LMP was approximately [unfilled] month(s) ago [Irregular Cycle Intervals] : are  irregular Yes

## 2024-05-01 ENCOUNTER — OUTPATIENT (OUTPATIENT)
Dept: OUTPATIENT SERVICES | Facility: HOSPITAL | Age: 54
LOS: 1 days | Discharge: HOME | End: 2024-05-01

## 2024-05-01 DIAGNOSIS — D72.829 ELEVATED WHITE BLOOD CELL COUNT, UNSPECIFIED: ICD-10-CM

## 2024-05-01 DIAGNOSIS — Z98.890 OTHER SPECIFIED POSTPROCEDURAL STATES: Chronic | ICD-10-CM

## 2024-05-07 ENCOUNTER — LABORATORY RESULT (OUTPATIENT)
Age: 54
End: 2024-05-07

## 2024-05-08 LAB
BASOPHILS # BLD AUTO: 0 K/UL
BASOPHILS NFR BLD AUTO: 0 %
EOSINOPHIL # BLD AUTO: 0 K/UL
EOSINOPHIL NFR BLD AUTO: 0 %
FERRITIN SERPL-MCNC: 13 NG/ML
FOLATE SERPL-MCNC: 11.2 NG/ML
HCT VFR BLD CALC: 36.6 %
HGB BLD-MCNC: 10 G/DL
IRON SATN MFR SERPL: 7 %
IRON SERPL-MCNC: 30 UG/DL
LYMPHOCYTES # BLD AUTO: 2.6 K/UL
LYMPHOCYTES NFR BLD AUTO: 22.6 %
MAN DIFF?: NORMAL
MCHC RBC-ENTMCNC: 23 PG
MCHC RBC-ENTMCNC: 27.3 GM/DL
MCV RBC AUTO: 84.3 FL
MONOCYTES # BLD AUTO: 0.3 K/UL
MONOCYTES NFR BLD AUTO: 2.6 %
NEUTROPHILS # BLD AUTO: 8.51 K/UL
NEUTROPHILS NFR BLD AUTO: 73.9 %
PLATELET # BLD AUTO: 553 K/UL
RBC # BLD: 4.34 M/UL
RBC # FLD: 16.7 %
TIBC SERPL-MCNC: 454 UG/DL
UIBC SERPL-MCNC: 425 UG/DL
VIT B12 SERPL-MCNC: 449 PG/ML
WBC # FLD AUTO: 11.51 K/UL

## 2024-05-09 ENCOUNTER — APPOINTMENT (OUTPATIENT)
Dept: HEMATOLOGY ONCOLOGY | Facility: CLINIC | Age: 54
End: 2024-05-09
Payer: COMMERCIAL

## 2024-05-09 DIAGNOSIS — Z92.89 PERSONAL HISTORY OF OTHER MEDICAL TREATMENT: ICD-10-CM

## 2024-05-09 PROCEDURE — 99213 OFFICE O/P EST LOW 20 MIN: CPT

## 2024-05-09 NOTE — HISTORY OF PRESENT ILLNESS
[de-identified] : 7/2021-Patient presented at the request of her PCP for leukocytosis, found on routine lab work by PCP-goes for lab work Q 3 months for Hemoglobin A1C. Also noted intermittent elevation of RBC. Erythropoietin level elevated at 43. CASE 2 mutation studies negative. BCR/ABL not detected. Has a h/o sleep apnea.\par  \par  \par  \par  \par   [de-identified] : Dr. Cotton monitoring breasts (h/o ADH). Menses irregular; at times may last 2 weeks.   2 Weeks post last visit, had "worst episode of vaginal bleeding in last 2 years" -lasted ~ 1 week. Since then, only intermittent spotting. Taking the PO iron TID-feels she is tolerating well.  Has sleep apnea-does not use CPAP machine-had difficulty using it. No current c/o CP, SOB, N/V/D, change in bowel/bladder habits. No fevers, abnormal bruising. No c/o H/A, pruritis.

## 2024-05-09 NOTE — REASON FOR VISIT
[Follow-Up Visit] : a follow-up visit for [Home] : at home, [unfilled] , at the time of the visit. [Medical Office: ([facility])___] : at the medical office located in  [Patient] : the patient [Self] : self [FreeTextEntry2] : leukocytosis

## 2024-05-09 NOTE — ASSESSMENT
[FreeTextEntry1] : Lab work reviewed. #1) Chronic, intermittent leukocytosis/thrombocytosis dating back at least to 9/2019 upon review of old lab work available. H/O chronically intermittently elevated hematocrit noted as well. Suspect may have reactive component to elevated Hct. in this patient with h/o sleep apnea, and elevated erythropoietin. Intermittent abnormalities suggestive of chronic benign processes currently. CASE 2 mutation studies, BCR-ABL unremarkable 7/2021. --Continue to monitor CBC with differential.   #2) anemia/h/o decreased iron suspect associated with menstrual blood loss/abnormal vaginal bleeding. Have reviewed iron treatment options. Patient has felt she is tolerating p.o. iron right now, and has wished to continue it. However, recurrent anemia noted and ferritin trending downward. To reconsider course of IV iron-patient stated she will reconsider if no improvement in 4 weeks. --short interval follow-up lab work to be done. --continue GYN f/u. --f/u GI (Dr. Link)  #3) h/o elevated PTT-negative LA screen. Corrected with 50:50 mixing study with no factor 8, 9, 11, 12 deficiency. VW Ag/activity WNL 10/2021. No FH of a bleeding disorder, and patient has denied any personal h/o abnormal dar-procedural/operative bleeding. Follow-up PTT WNL 7/24/2023. Follow clinically.  #3) PO vitamin B 12 for h/o a vitamin B 12 level <400.   If hematologic abnormalities persist post improvement in iron deficiency, to further consider evaluation/BMB to r/o underlying, evolving BM disorder/MPD.  Patient was given the opportunity to ask questions, and her questions have been answered to her apparent satisfaction at this time. She is agreeable to recommended f/u.  -->Ferrous sulfate 325 mg PO TID with vitamin C. Vitamin B 12-1 mg PO daily. RTO 4-5 weeks.

## 2024-05-09 NOTE — PHYSICAL EXAM
[Normal] : affect appropriate [de-identified] : non-toxic appearing [de-identified] : breathing appeared unlabored [de-identified] : coloration appeared normal

## 2024-05-14 NOTE — BRIEF OPERATIVE NOTE - NSICDXBRIEFPOSTOP_GEN_ALL_CORE_FT
INTERVAL Hx:  Records and clinical information were reviewed. Having some likely reflux this AM--ate multiple rich and sweet foods at breakfast. Also mentioned having some AH's which can be distressing, but it's not clear if she's truly hallucinating or if she just wants to extend her stay here. Seems to be less angry about her roommate at the sober living home, and she states she knows it's the right place for her to go back to (really, the only place). No urges to assault or injure herself or the roommate, and she states she knows she needs to keep her distance from her if/when she starts to feel angry. Cognitive limitations contribute to her symptom expression as well, and she generally has very poor judgment at baseline. No SE's with the increased Zoloft ore decreased Seroquel. Slept 6.75 hours last night.      MEDS:  Current Facility-Administered Medications   Medication Dose Route Frequency    QUEtiapine (SEROQUEL XR) extended release tablet 200 mg  200 mg Oral Nightly    sertraline (ZOLOFT) tablet 100 mg  100 mg Oral Daily    traZODone (DESYREL) tablet 200 mg  200 mg Oral Nightly PRN    nicotine (NICODERM CQ) 21 MG/24HR 1 patch  1 patch TransDERmal Daily    methadone (DOLOPHINE) tablet 100 mg  100 mg Oral Daily    hydroCHLOROthiazide capsule 12.5 mg  12.5 mg Oral Daily    acetaminophen (TYLENOL) tablet 650 mg  650 mg Oral Q4H PRN    polyethylene glycol (GLYCOLAX) packet 17 g  17 g Oral Daily PRN    aluminum & magnesium hydroxide-simethicone (MAALOX) 200-200-20 MG/5ML suspension 30 mL  30 mL Oral Q6H PRN    hydrOXYzine HCl (ATARAX) tablet 50 mg  50 mg Oral TID PRN    haloperidol (HALDOL) tablet 5 mg  5 mg Oral Q4H PRN    Or    haloperidol lactate (HALDOL) injection 5 mg  5 mg IntraMUSCular Q4H PRN    diphenhydrAMINE (BENADRYL) injection 50 mg  50 mg IntraMUSCular Q4H PRN       VITALS: Patient Vitals for the past 12 hrs:   Temp Pulse Resp BP SpO2   05/14/24 0802 98.6 °F (37 °C) 99 16 (!) 153/118 96 % 
INTERVAL Hx:  Records and clinical information were reviewed. States she's feeling \"OK\", but that she still harbors some anger about her roommate at the sober living home. No urges to assault or injure her, and she states she knows she needs to keep her distance from her if/when she starts to feel angry. Says her mood has been been moderately better although not great. However, she's cognitively limited with very little insight and generally very poor judgment at baseline. No SI and no true HI at all. Discussed Tx and D/C plans and the need to prepare to try and get along with the roommate once she returns there. Has some mild twitching--will decrease the Seroquel XR. Will also increase the Zoloft to 100 mg to help with the irritability. Slept 10 hours last night.      MEDS:  Current Facility-Administered Medications   Medication Dose Route Frequency    QUEtiapine (SEROQUEL XR) extended release tablet 200 mg  200 mg Oral Nightly    [START ON 5/14/2024] sertraline (ZOLOFT) tablet 100 mg  100 mg Oral Daily    sertraline (ZOLOFT) tablet 50 mg  50 mg Oral NOW    traZODone (DESYREL) tablet 200 mg  200 mg Oral Nightly PRN    nicotine (NICODERM CQ) 21 MG/24HR 1 patch  1 patch TransDERmal Daily    methadone (DOLOPHINE) tablet 100 mg  100 mg Oral Daily    hydroCHLOROthiazide capsule 12.5 mg  12.5 mg Oral Daily    acetaminophen (TYLENOL) tablet 650 mg  650 mg Oral Q4H PRN    polyethylene glycol (GLYCOLAX) packet 17 g  17 g Oral Daily PRN    aluminum & magnesium hydroxide-simethicone (MAALOX) 200-200-20 MG/5ML suspension 30 mL  30 mL Oral Q6H PRN    hydrOXYzine HCl (ATARAX) tablet 50 mg  50 mg Oral TID PRN    haloperidol (HALDOL) tablet 5 mg  5 mg Oral Q4H PRN    Or    haloperidol lactate (HALDOL) injection 5 mg  5 mg IntraMUSCular Q4H PRN    diphenhydrAMINE (BENADRYL) injection 50 mg  50 mg IntraMUSCular Q4H PRN       VITALS: Patient Vitals for the past 12 hrs:   Temp Pulse Resp BP SpO2   05/13/24 0713 98.2 °F (36.8 °C) (!) 
Spiritual Care Assessment/Progress Note  [facility]    Name: Bob Ocasio MRN: [id number]    Age: 32 y.o.     Sex: female   Language: English     Date: 2024            Total Time Calculated: 55 min              Spiritual Assessment begun in [facility]  Service Provided For: Patient  Referral/Consult From: Nurse  Encounter Overview/Reason: Initial Encounter    Spiritual beliefs:      [x] Involved in a sandra tradition/spiritual practice:      [] Supported by a sandra community:      [] Claims no spiritual orientation:      [] Seeking spiritual identity:           [] Adheres to an individual form of spirituality:      [] Not able to assess:                Identified resources for coping and support system:   Support System: Children, Spouse       [x] Prayer                  [x] Devotional reading               [] Music                  [] Guided Imagery     [] Pet visits                                        [] Other: (COMMENT)     Specific area/focus of visit   Encounter:  Initial  Crisis:    Spiritual/Emotional needs: Type: Spiritual Support, Emotional Distress  Ritual, Rites and Sacraments:    Grief, Loss, and Adjustments:    Ethics/Mediation:    Behavioral Health: Type : Initial Encounter  Palliative Care:    Advance Care Planning:           Narrative: The  received a consult for the Pt from the nurse on the Behavioral Unit as part of the routine admission. The Pt was sitting in the Common Area with a nurse combing her hair and another engaging in conversation with another nurse. There were two other Pts in the area watching TV. The Pt began to share part of her history--her mother had  when she was [age] and that she was still grieving the loss. The Pt shared many feelings of her being motherless--alone, lack of security, abandonment, etc. The  actively listened, encouraged the Pt of her strength, and resilience. The  provided the Pt with the Daily 
POST-OP DIAGNOSIS:  Abnormal uterine bleeding 28-Nov-2023 10:53:43  Truman Cleary

## 2024-06-09 PROBLEM — D64.9 ANEMIA, UNSPECIFIED TYPE: Status: ACTIVE | Noted: 2019-03-20

## 2024-06-09 PROBLEM — Z86.2 HISTORY OF THROMBOCYTOSIS: Status: ACTIVE | Noted: 2021-07-25

## 2024-06-09 PROBLEM — D72.829 LEUKOCYTOSIS: Status: ACTIVE | Noted: 2019-09-20

## 2024-06-12 ENCOUNTER — LABORATORY RESULT (OUTPATIENT)
Age: 54
End: 2024-06-12

## 2024-06-13 ENCOUNTER — APPOINTMENT (OUTPATIENT)
Dept: HEMATOLOGY ONCOLOGY | Facility: CLINIC | Age: 54
End: 2024-06-13
Payer: COMMERCIAL

## 2024-06-13 DIAGNOSIS — D64.9 ANEMIA, UNSPECIFIED: ICD-10-CM

## 2024-06-13 DIAGNOSIS — Z86.2 PERSONAL HISTORY OF DISEASES OF THE BLOOD AND BLOOD-FORMING ORGANS AND CERTAIN DISORDERS INVOLVING THE IMMUNE MECHANISM: ICD-10-CM

## 2024-06-13 DIAGNOSIS — D72.829 ELEVATED WHITE BLOOD CELL COUNT, UNSPECIFIED: ICD-10-CM

## 2024-06-13 LAB
BASOPHILS # BLD AUTO: 0.04 K/UL
BASOPHILS NFR BLD AUTO: 0.4 %
EOSINOPHIL # BLD AUTO: 0.08 K/UL
EOSINOPHIL NFR BLD AUTO: 0.9 %
FERRITIN SERPL-MCNC: 28 NG/ML
HCT VFR BLD CALC: 38.4 %
HGB BLD-MCNC: 10.9 G/DL
IMM GRANULOCYTES NFR BLD AUTO: 0.2 %
IRON SATN MFR SERPL: 5 %
IRON SERPL-MCNC: 22 UG/DL
LYMPHOCYTES # BLD AUTO: 1.95 K/UL
LYMPHOCYTES NFR BLD AUTO: 21.3 %
MAN DIFF?: NORMAL
MCHC RBC-ENTMCNC: 23.4 PG
MCHC RBC-ENTMCNC: 28.4 GM/DL
MCV RBC AUTO: 82.6 FL
MONOCYTES # BLD AUTO: 0.61 K/UL
MONOCYTES NFR BLD AUTO: 6.7 %
NEUTROPHILS # BLD AUTO: 6.45 K/UL
NEUTROPHILS NFR BLD AUTO: 70.5 %
PLATELET # BLD AUTO: 432 K/UL
RBC # BLD: 4.65 M/UL
RBC # FLD: 21 %
TIBC SERPL-MCNC: 411 UG/DL
UIBC SERPL-MCNC: 389 UG/DL
WBC # FLD AUTO: 9.15 K/UL

## 2024-06-13 PROCEDURE — 99213 OFFICE O/P EST LOW 20 MIN: CPT

## 2024-06-13 NOTE — ASSESSMENT
[FreeTextEntry1] : Lab work reviewed. #1) Chronic, intermittent leukocytosis/thrombocytosis dating back at least to 9/2019 upon review of old lab work available. H/O chronically intermittently elevated hematocrit noted as well. Suspect may have reactive component to elevated Hct. in this patient with h/o sleep apnea, and elevated erythropoietin. Intermittent abnormalities suggestive of chronic benign processes currently. CASE 2 mutation studies, BCR-ABL unremarkable 7/2021. --clinically stable currently --Continue to monitor CBC with differential.   #2) anemia/h/o decreased iron suspect associated with menstrual blood loss/abnormal vaginal bleeding. Have reviewed iron treatment options. Patient has felt she is tolerating p.o. iron right now, and has wished to continue it. Hemoglobin currently stable. --continue GYN f/u. --f/u GI (Dr. Link)-has appt. scheduled 7/12/2024.  #3) h/o elevated PTT-negative LA screen. Corrected with 50:50 mixing study with no factor 8, 9, 11, 12 deficiency. VW Ag/activity WNL 10/2021. No FH of a bleeding disorder, and patient has denied any personal h/o abnormal dar-procedural/operative bleeding. Follow-up PTT WNL 7/24/2023. Follow clinically.  #3) PO vitamin B 12 for h/o a vitamin B 12 level <400.   If hematologic abnormalities persist post improvement in iron deficiency, to further consider evaluation/BMB to r/o underlying, evolving BM disorder/MPD.  Patient was given the opportunity to ask questions, and her questions have been answered to her apparent satisfaction at this time. She is agreeable to recommended f/u.  -->Ferrous sulfate 325 mg PO TID with vitamin C. Vitamin B 12-1 mg PO daily. RTO ~3 months..

## 2024-06-13 NOTE — HISTORY OF PRESENT ILLNESS
[de-identified] : 7/2021-Patient presented at the request of her PCP for leukocytosis, found on routine lab work by PCP-goes for lab work Q 3 months for Hemoglobin A1C. Also noted intermittent elevation of RBC. Erythropoietin level elevated at 43. CASE 2 mutation studies negative. BCR/ABL not detected. Has a h/o sleep apnea.\par  \par  \par  \par  \par   [de-identified] : Vaginal bleeding x 2 weeks. Thinking about hysterectomy, though patient hoping to defer as long as possible. Dr. Cotton monitoring breasts (h/o ADH)-has MRI tomorrow. Taking the PO iron TID-feels she is tolerating well.  Has sleep apnea-does not use CPAP machine-had difficulty using it. No current c/o CP, SOB, N/V/D, change in bowel/bladder habits. No fevers, abnormal bruising. No c/o H/A, pruritis.

## 2024-06-13 NOTE — PHYSICAL EXAM
[Normal] : affect appropriate [de-identified] : non-toxic appearing [de-identified] : breathing appeared unlabored [de-identified] : coloration appeared normal

## 2024-06-13 NOTE — REASON FOR VISIT
[Follow-Up Visit] : a follow-up visit for [Home] : at home, [unfilled] , at the time of the visit. [Medical Office: ([facility])___] : at the medical office located in  [Patient] : the patient [Self] : self [FreeTextEntry2] : leukocytosis

## 2024-08-07 ENCOUNTER — RESULT CHARGE (OUTPATIENT)
Age: 54
End: 2024-08-07

## 2024-08-08 ENCOUNTER — LABORATORY RESULT (OUTPATIENT)
Age: 54
End: 2024-08-08

## 2024-08-08 ENCOUNTER — APPOINTMENT (OUTPATIENT)
Dept: INTERNAL MEDICINE | Facility: CLINIC | Age: 54
End: 2024-08-08

## 2024-08-08 PROBLEM — D50.9 IRON DEFICIENCY ANEMIA, UNSPECIFIED IRON DEFICIENCY ANEMIA TYPE: Status: ACTIVE | Noted: 2024-08-08

## 2024-08-08 PROCEDURE — 99214 OFFICE O/P EST MOD 30 MIN: CPT

## 2024-08-08 PROCEDURE — 81003 URINALYSIS AUTO W/O SCOPE: CPT | Mod: QW

## 2024-08-08 PROCEDURE — 36415 COLL VENOUS BLD VENIPUNCTURE: CPT

## 2024-08-08 PROCEDURE — G2211 COMPLEX E/M VISIT ADD ON: CPT | Mod: NC

## 2024-08-08 NOTE — PAST MEDICAL HISTORY
[Definite ___ (Date)] : the last menstrual period was [unfilled] [Menstruating] : hx of menstruating [Approximately ___ (Month)] : the LMP was approximately [unfilled] month(s) ago [Irregular Cycle Intervals] : are  irregular

## 2024-08-08 NOTE — PLAN
[FreeTextEntry1] : Gynecology   Menorrhagia-advised to follow-up with gynecology.  Advised to consider hysterectomy if having excessive bleeding and because of anemia.  Endo  - Diabetes -  increase Xultophy 50  -unit subcutaneous injections q.d. as directed.  Check fasting blood work, hemoglobin A1c, urine microalbumin.  Follow-up with endocrinology on Thursday. Will fax blood test over to her endocrinologist. Advised patient to continue with rosuvastatin 40 mg daily as long as aspirin as well as aspirin daily.  Check lipid panel would like a goal of an LDL less than 70  Advised yearly retinal eye exam. Continue with Crestor and added Ramipril today - discussed renal protection - Reviewed side effects to   Medication  Adult BMI screening and followup:  The patient's BMI is about 38. Counseled patient regarding BMI, healthy eating, portion control and exercise importance of diet to maintain a healthy weight.  Counseled patient on importance of exercise to maintain a healthy weight.  Cardiology hyperlipidemia - continue Rosuvastatin Calcium 40 mg p.o.q.d. and Ezetimibe 10 mg p.o.q.d. - continue low cholesterol/low fat diet. Check Fasting lipid panel, liver function test.  Cardio - Hypertension -  Patient was educated about hypertension and the importance of controlling the pressure through lifestyle modification which include low sodium diet and aerobic  exercise.  Also discussed the use of prescription medication which included their benefits and their side effects. We discussed the use of ASA 81 mg daily.   Having a BMI less than or equal to 25.  Continue with Altace 5 mg daily -  advised patient to continue with weight loss, continue with her walking exercise program.  Hematology-iron deficiency anemia secondary to management arise.  Advised patient to follow-up with gynecology and hematology continue with iron tabs.  Continue with IV iron diet.  Check CBC and iron levels today. Patient is in full awareness of the plan and agrees to it.  All pt question was answered.    I spent 33 Minutes with the patient, half of which we discussed finding on physical exam  and coordinated care.  As well as reviewed my plans and follow ups. Dragon speech recognition software was used to create portions of this document.  An attempt at proofreading has been made to minimize errors please call if any questions arise.

## 2024-08-08 NOTE — PHYSICAL EXAM
[No Acute Distress] : no acute distress [Well Nourished] : well nourished [Well Developed] : well developed [Well-Appearing] : well-appearing [Normal Sclera/Conjunctiva] : normal sclera/conjunctiva [PERRL] : pupils equal round and reactive to light [EOMI] : extraocular movements intact [Normal Outer Ear/Nose] : the outer ears and nose were normal in appearance [Normal Oropharynx] : the oropharynx was normal [Normal TMs] : both tympanic membranes were normal [No JVD] : no jugular venous distention [No Lymphadenopathy] : no lymphadenopathy [Supple] : supple [Thyroid Normal, No Nodules] : the thyroid was normal and there were no nodules present [No Respiratory Distress] : no respiratory distress  [Clear to Auscultation] : lungs were clear to auscultation bilaterally [No Accessory Muscle Use] : no accessory muscle use [Normal Rate] : normal rate  [Regular Rhythm] : with a regular rhythm [Normal S1, S2] : normal S1 and S2 [No Carotid Bruits] : no carotid bruits [No Abdominal Bruit] : a ~M bruit was not heard ~T in the abdomen [No Varicosities] : no varicosities [Pedal Pulses Present] : the pedal pulses are present [No Edema] : there was no peripheral edema [No Palpable Aorta] : no palpable aorta [No Extremity Clubbing/Cyanosis] : no extremity clubbing/cyanosis [Soft] : abdomen soft [Non Tender] : non-tender [Non-distended] : non-distended [No Masses] : no abdominal mass palpated [No HSM] : no HSM [Normal Bowel Sounds] : normal bowel sounds [Normal Posterior Cervical Nodes] : no posterior cervical lymphadenopathy [Normal Anterior Cervical Nodes] : no anterior cervical lymphadenopathy [No CVA Tenderness] : no CVA  tenderness [No Spinal Tenderness] : no spinal tenderness [No Joint Swelling] : no joint swelling [Grossly Normal Strength/Tone] : grossly normal strength/tone [No Rash] : no rash [Both Ears Pierced] : both ears [Tattoo - Single] : a single tattoo was observed [Coordination Grossly Intact] : coordination grossly intact [No Focal Deficits] : no focal deficits [Normal Gait] : normal gait [Speech Grossly Normal] : speech grossly normal [Deep Tendon Reflexes (DTR)] : deep tendon reflexes were 2+ and symmetric [Normal Affect] : the affect was normal [Alert and Oriented x3] : oriented to person, place, and time [Normal Mood] : the mood was normal [Normal Insight/Judgement] : insight and judgment were intact [de-identified] : w/ murmur  [de-identified] : obese [de-identified] :  as per gynecology/ breast surgeon [FreeTextEntry1] :  as per gynecology [de-identified] :  as per gynecology

## 2024-08-08 NOTE — REVIEW OF SYSTEMS
[Dysmenorrhea] : dysmenorrhea [Negative] : Heme/Lymph [Fever] : no fever [Fatigue] : no fatigue [Recent Change In Weight] : ~T no recent weight change [Discharge] : no discharge [Pain] : no pain [Vision Problems] : no vision problems [Itching] : no itching [Earache] : no earache [Hearing Loss] : no hearing loss [Nasal Discharge] : no nasal discharge [Sore Throat] : no sore throat [Chest Pain] : no chest pain [Palpitations] : no palpitations [Lower Ext Edema] : no lower extremity edema [Orthopnea] : no orthopnea [Paroxysmal Nocturnal Dyspnea] : no paroxysmal nocturnal dyspnea [Shortness Of Breath] : no shortness of breath [Cough] : no cough [Dyspnea on Exertion] : no dyspnea on exertion [Nausea] : no nausea [Constipation] : no constipation [Diarrhea] : diarrhea [Heartburn] : no heartburn [Melena] : no melena [Dysuria] : no dysuria [Joint Pain] : no joint pain [Muscle Pain] : no muscle pain [Skin Rash] : no skin rash [Headache] : no headache [Dizziness] : no dizziness [Fainting] : no fainting [Memory Loss] : no memory loss [Easy Bruising] : no easy bruising [Swollen Glands] : no swollen glands [FreeTextEntry8] : heavy menses

## 2024-08-08 NOTE — HISTORY OF PRESENT ILLNESS
[FreeTextEntry1] : Fasting labs, medication renewal and a check up [de-identified] : Ms. VELAZQUEZ is a 54 year- female, with a past medical history as noted below, who present to the office today for fasting labs and medication renewal. Going next week for a endo consult about elevated blood glucose levels. States she has been taking her medication on a routine basis.  Blood glucose level was 220 this morning.  Has been dieting and exercising. Patient also states she is status post D&C still having excessive bleeding.  Gynecologist was talk about hysterectomy.  Patient states she does not want to have a hysterectomy at this time.  Follows up with hematology regarding low hemoglobin hematocrit.  Currently on Feosol 325 3 times a day.

## 2024-08-08 NOTE — ASSESSMENT
[FreeTextEntry1] : Ms. VELAZQUEZ is a 54 year female, with a past medical history as noted above, who present to the office today for fasting labs, medication renewal and a general check up

## 2024-08-08 NOTE — HEALTH RISK ASSESSMENT
[Yes] : Yes [2 - 3 times a week (3 pts)] : 2 - 3  times a week (3 points) [Less than monthly (1 pt)] : Less than monthly (1 point) [Former] : Former [1 or 2 (0 pts)] : 1 or 2 (0 points) [No] : In the past 12 months have you used drugs other than those required for medical reasons? No [No falls in past year] : Patient reported no falls in the past year [Little interest or pleasure doing things] : 1) Little interest or pleasure doing things [0] : 1) Little interest or pleasure doing things: Not at all (0) [Feeling down, depressed, or hopeless] : 2) Feeling down, depressed, or hopeless [1] : 2) Feeling down, depressed, or hopeless for several days (1) [PHQ-2 Negative - No further assessment needed] : PHQ-2 Negative - No further assessment needed [de-identified] : ENDO-  GI  [Audit-CScore] : 4 [de-identified] : walking dog  [de-identified] : low glycemic  [[id number]] : 1

## 2024-08-13 NOTE — ED ADULT NURSE NOTE - PMH
Attempted to contact pt via language line (Santi #[id number]) to discuss syphilis results but pt did not answer. The  did leave a voicemail asking pt to return our call.     I would like to repeat her labs in 6 months. Her titer is at 4dils which may be where she stays. If it increases any further, we may need to retreat her
Basal cell cancer  removed upper back  Diabetes    Gestational diabetes

## 2024-08-16 RX ORDER — INSULIN ASPART 100 [IU]/ML
100 INJECTION, SOLUTION INTRAVENOUS; SUBCUTANEOUS
Refills: 0 | Status: ACTIVE | COMMUNITY
Start: 2024-08-16

## 2024-08-16 RX ORDER — INSULIN DEGLUDEC INJECTION 100 U/ML
100 INJECTION, SOLUTION SUBCUTANEOUS DAILY
Refills: 0 | Status: ACTIVE | COMMUNITY
Start: 2024-08-16

## 2024-09-07 ENCOUNTER — OUTPATIENT (OUTPATIENT)
Dept: OUTPATIENT SERVICES | Facility: HOSPITAL | Age: 54
LOS: 1 days | Discharge: HOME | End: 2024-09-07

## 2024-09-07 DIAGNOSIS — Z98.890 OTHER SPECIFIED POSTPROCEDURAL STATES: Chronic | ICD-10-CM

## 2024-09-07 DIAGNOSIS — D72.829 ELEVATED WHITE BLOOD CELL COUNT, UNSPECIFIED: ICD-10-CM

## 2024-09-16 ENCOUNTER — APPOINTMENT (OUTPATIENT)
Dept: HEMATOLOGY ONCOLOGY | Facility: CLINIC | Age: 54
End: 2024-09-16
Payer: COMMERCIAL

## 2024-09-16 DIAGNOSIS — D64.9 ANEMIA, UNSPECIFIED: ICD-10-CM

## 2024-09-16 DIAGNOSIS — D72.829 ELEVATED WHITE BLOOD CELL COUNT, UNSPECIFIED: ICD-10-CM

## 2024-09-16 DIAGNOSIS — Z86.2 PERSONAL HISTORY OF DISEASES OF THE BLOOD AND BLOOD-FORMING ORGANS AND CERTAIN DISORDERS INVOLVING THE IMMUNE MECHANISM: ICD-10-CM

## 2024-09-16 PROCEDURE — 99214 OFFICE O/P EST MOD 30 MIN: CPT

## 2024-09-16 NOTE — REASON FOR VISIT
[Follow-Up Visit] : a follow-up visit for [Home] : at home, [unfilled] , at the time of the visit. [Medical Office: ([facility])___] : at the medical office located in  [Patient] : the patient [Self] : self [FreeTextEntry2] : leukocytosis

## 2024-09-16 NOTE — REASON FOR VISIT
[Follow-Up Visit] : a follow-up visit for [Home] : at home, [unfilled] , at the time of the visit. [Medical Office: ([facility])___] : at the medical office located in  [Patient] : the patient [Self] : self [FreeTextEntry2] : leukocytosis

## 2024-09-16 NOTE — ASSESSMENT
[FreeTextEntry1] : Lab work reviewed. #1) Chronic, intermittent leukocytosis/thrombocytosis dating back at least to 9/2019 upon review of old lab work available. H/O chronically intermittently elevated hematocrit noted as well. Suspect may have reactive component to elevated Hct. in this patient with h/o sleep apnea, and elevated erythropoietin. Intermittent abnormalities suggestive of chronic benign processes currently. CASE 2 mutation studies, BCR-ABL unremarkable 7/2021. --clinically stable currently --Continue to monitor CBC with differential.   #2) anemia/h/o decreased iron clinically appears associated with menstrual blood loss/abnormal vaginal bleeding. Have reviewed iron treatment options. Patient has felt she is tolerating p.o. iron, and has wished to continue it. Hemoglobinn, however, is decreased on most recent lab work available-discussed pro's/con's of parenteral iron in this situation. Potential side effects of IV venofer reviewed including but not limited to allergic reaction, nausea, headache. Patient gave verbal consent for Venofer. --continue GYN f/u. --f/u GI (Dr. Link) as planned  #3) h/o elevated PTT-negative LA screen. Corrected with 50:50 mixing study with no factor 8, 9, 11, 12 deficiency. VW Ag/activity WNL 10/2021. No FH of a bleeding disorder, and patient has denied any personal h/o abnormal dar-procedural/operative bleeding. Follow-up PTT WNL 7/24/2023. Follow clinically.  #3) PO vitamin B 12 for h/o a vitamin B 12 level <400.   If hematologic abnormalities persist post improvement in iron deficiency, to further consider evaluation/BMB to r/o underlying, evolving BM disorder/MPD.  Patient was given the opportunity to ask questions, and her questions have been answered to her apparent satisfaction at this time. She is agreeable to recommended f/u.  --> Schedule Venofer 200 mg IVSS weekly x 4.  Vitamin B 12-1 mg PO daily. RTO ~5 weeks.

## 2024-09-16 NOTE — HISTORY OF PRESENT ILLNESS
[de-identified] : 7/2021-Patient presented at the request of her PCP for leukocytosis, found on routine lab work by PCP-goes for lab work Q 3 months for Hemoglobin A1C. Also noted intermittent elevation of RBC. Erythropoietin level elevated at 43. CASE 2 mutation studies negative. BCR/ABL not detected. Has a h/o sleep apnea.\par  \par  \par  \par  \par   [de-identified] : S/P D&C 11/2023. Has had persistent irregular excessive bleeding.  Saw GI Dr. Link for abdominal pains-is scheduled for a colonoscopy in 2 weeks-has not been taking iron PO "for a while." Now taking it again. Taking po vitamin D per PCP. Dr. Cotton monitoring breasts (h/o ADH). Has sleep apnea-does not use CPAP machine-had difficulty using it. No current c/o CP, SOB. No fevers, abnormal bruising. No c/o H/A, pruritis.

## 2024-09-16 NOTE — PHYSICAL EXAM
[Normal] : affect appropriate [de-identified] : non-toxic appearing [de-identified] : breathing appeared unlabored [de-identified] : coloration appeared normal

## 2024-09-16 NOTE — REASON FOR VISIT
[Follow-Up Visit] : a follow-up visit for [Home] : at home, [unfilled] , at the time of the visit. [Medical Office: ([facility])___] : at the medical office located in  [Patient] : the patient [Self] : self [FreeTextEntry2] : leukocytosis

## 2024-09-16 NOTE — PHYSICAL EXAM
[Normal] : affect appropriate [de-identified] : non-toxic appearing [de-identified] : breathing appeared unlabored [de-identified] : coloration appeared normal

## 2024-09-16 NOTE — PHYSICAL EXAM
[Normal] : affect appropriate [de-identified] : non-toxic appearing [de-identified] : breathing appeared unlabored [de-identified] : coloration appeared normal

## 2024-09-16 NOTE — HISTORY OF PRESENT ILLNESS
[de-identified] : 7/2021-Patient presented at the request of her PCP for leukocytosis, found on routine lab work by PCP-goes for lab work Q 3 months for Hemoglobin A1C. Also noted intermittent elevation of RBC. Erythropoietin level elevated at 43. CASE 2 mutation studies negative. BCR/ABL not detected. Has a h/o sleep apnea.\par  \par  \par  \par  \par   [de-identified] : S/P D&C 11/2023. Has had persistent irregular excessive bleeding.  Saw GI Dr. Link for abdominal pains-is scheduled for a colonoscopy in 2 weeks-has not been taking iron PO "for a while." Now taking it again. Taking po vitamin D per PCP. Dr. Cotton monitoring breasts (h/o ADH). Has sleep apnea-does not use CPAP machine-had difficulty using it. No current c/o CP, SOB. No fevers, abnormal bruising. No c/o H/A, pruritis.

## 2024-09-16 NOTE — HISTORY OF PRESENT ILLNESS
[de-identified] : 7/2021-Patient presented at the request of her PCP for leukocytosis, found on routine lab work by PCP-goes for lab work Q 3 months for Hemoglobin A1C. Also noted intermittent elevation of RBC. Erythropoietin level elevated at 43. CASE 2 mutation studies negative. BCR/ABL not detected. Has a h/o sleep apnea.\par  \par  \par  \par  \par   [de-identified] : S/P D&C 11/2023. Has had persistent irregular excessive bleeding.  Saw GI Dr. Link for abdominal pains-is scheduled for a colonoscopy in 2 weeks-has not been taking iron PO "for a while." Now taking it again. Taking po vitamin D per PCP. Dr. Cotton monitoring breasts (h/o ADH). Has sleep apnea-does not use CPAP machine-had difficulty using it. No current c/o CP, SOB. No fevers, abnormal bruising. No c/o H/A, pruritis.

## 2024-09-25 ENCOUNTER — APPOINTMENT (OUTPATIENT)
Dept: INFUSION THERAPY | Facility: HOSPITAL | Age: 54
End: 2024-09-25

## 2024-09-25 ENCOUNTER — APPOINTMENT (OUTPATIENT)
Dept: HEMATOLOGY ONCOLOGY | Facility: CLINIC | Age: 54
End: 2024-09-25
Payer: COMMERCIAL

## 2024-09-25 VITALS
TEMPERATURE: 98.1 F | HEART RATE: 90 BPM | DIASTOLIC BLOOD PRESSURE: 83 MMHG | OXYGEN SATURATION: 98 % | WEIGHT: 240 LBS | HEIGHT: 66 IN | BODY MASS INDEX: 38.57 KG/M2 | RESPIRATION RATE: 18 BRPM | SYSTOLIC BLOOD PRESSURE: 135 MMHG

## 2024-09-25 DIAGNOSIS — D50.9 IRON DEFICIENCY ANEMIA, UNSPECIFIED: ICD-10-CM

## 2024-09-25 DIAGNOSIS — E53.8 DEFICIENCY OF OTHER SPECIFIED B GROUP VITAMINS: ICD-10-CM

## 2024-09-25 PROCEDURE — 99214 OFFICE O/P EST MOD 30 MIN: CPT

## 2024-09-26 DIAGNOSIS — Z51.11 ENCOUNTER FOR ANTINEOPLASTIC CHEMOTHERAPY: ICD-10-CM

## 2024-09-26 PROBLEM — E53.8 LOW SERUM VITAMIN B12: Status: ACTIVE | Noted: 2024-09-26

## 2024-09-26 RX ORDER — PNV NO.95/FERROUS FUM/FOLIC AC 28MG-0.8MG
100 TABLET ORAL
Refills: 0 | Status: ACTIVE | COMMUNITY

## 2024-09-26 NOTE — PHYSICAL EXAM
[Normal] : affect appropriate [de-identified] : non-toxic appearing [de-identified] : breathing appeared unlabored [de-identified] : coloration appeared normal

## 2024-09-26 NOTE — PHYSICAL EXAM
[Normal] : affect appropriate [de-identified] : non-toxic appearing [de-identified] : breathing appeared unlabored [de-identified] : coloration appeared normal

## 2024-09-26 NOTE — ASSESSMENT
[FreeTextEntry1] : Lab work done 9/13/24, reviewed. Hbg 9.0g/dL, Hct 31.4%, plt 509K, iron 18, % iron sat 4%, ferritin 22, N84=515, folate 10.6 #1) Chronic, intermittent leukocytosis/thrombocytosis dating back at least to 9/2019 upon review of old lab work available. H/O chronically intermittently elevated hematocrit noted as well. Suspect may have reactive component to elevated Hct. in this patient with h/o sleep apnea, and elevated erythropoietin. Intermittent abnormalities suggestive of chronic benign processes currently. CASE 2 mutation studies, BCR-ABL unremarkable 7/2021. --clinically stable currently --Continue to monitor CBC with differential.   #2) anemia/h/o decreased iron clinically appears associated with menstrual blood loss/abnormal vaginal bleeding. Have reviewed iron treatment options. Patient has felt she is tolerating p.o. iron, and has wished to continue it. Hemoglobinn, however, is decreased on most recent lab work available-discussed pro's/con's of parenteral iron in this situation. Potential side effects of IV venofer reviewed including but not limited to allergic reaction, nausea, headache. Patient gave written consent for Venofer. - First dose of venofer today, 9/25/24. Plan for four infusions then repeat labs. --continue GYN f/u. --f/u GI (Dr. Link) as planned-appointment scheduled for next week  #3) h/o elevated PTT-negative LA screen. Corrected with 50:50 mixing study with no factor 8, 9, 11, 12 deficiency. VW Ag/activity WNL 10/2021. No FH of a bleeding disorder, and patient has denied any personal h/o abnormal dar-procedural/operative bleeding. Follow-up PTT WNL 7/24/2023. Follow clinically.  #3) PO vitamin B 12 for h/o a vitamin B 12 level <400.   If hematologic abnormalities persist post improvement in iron deficiency, to further consider evaluation/BMB to r/o underlying, evolving BM disorder/MPD.  Patient was given the opportunity to ask questions, and her questions have been answered to her apparent satisfaction at this time. She is agreeable to recommended f/u.  --> Plan for Venofer 200 mg IVSS weekly x 4.  Vitamin B 12-1 mg PO daily. RTO ~5 weeks.

## 2024-09-26 NOTE — ASSESSMENT
[FreeTextEntry1] : Lab work done 9/13/24, reviewed. Hbg 9.0g/dL, Hct 31.4%, plt 509K, iron 18, % iron sat 4%, ferritin 22, Z39=153, folate 10.6 #1) Chronic, intermittent leukocytosis/thrombocytosis dating back at least to 9/2019 upon review of old lab work available. H/O chronically intermittently elevated hematocrit noted as well. Suspect may have reactive component to elevated Hct. in this patient with h/o sleep apnea, and elevated erythropoietin. Intermittent abnormalities suggestive of chronic benign processes currently. CASE 2 mutation studies, BCR-ABL unremarkable 7/2021. --clinically stable currently --Continue to monitor CBC with differential.   #2) anemia/h/o decreased iron clinically appears associated with menstrual blood loss/abnormal vaginal bleeding. Have reviewed iron treatment options. Patient has felt she is tolerating p.o. iron, and has wished to continue it. Hemoglobinn, however, is decreased on most recent lab work available-discussed pro's/con's of parenteral iron in this situation. Potential side effects of IV venofer reviewed including but not limited to allergic reaction, nausea, headache. Patient gave written consent for Venofer. - First dose of venofer today, 9/25/24. Plan for four infusions then repeat labs. --continue GYN f/u. --f/u GI (Dr. Link) as planned-appointment scheduled for next week  #3) h/o elevated PTT-negative LA screen. Corrected with 50:50 mixing study with no factor 8, 9, 11, 12 deficiency. VW Ag/activity WNL 10/2021. No FH of a bleeding disorder, and patient has denied any personal h/o abnormal dar-procedural/operative bleeding. Follow-up PTT WNL 7/24/2023. Follow clinically.  #3) PO vitamin B 12 for h/o a vitamin B 12 level <400.   If hematologic abnormalities persist post improvement in iron deficiency, to further consider evaluation/BMB to r/o underlying, evolving BM disorder/MPD.  Patient was given the opportunity to ask questions, and her questions have been answered to her apparent satisfaction at this time. She is agreeable to recommended f/u.  --> Plan for Venofer 200 mg IVSS weekly x 4.  Vitamin B 12-1 mg PO daily. RTO ~5 weeks.

## 2024-09-26 NOTE — PHYSICAL EXAM
[Normal] : affect appropriate [de-identified] : non-toxic appearing [de-identified] : breathing appeared unlabored [de-identified] : coloration appeared normal

## 2024-09-26 NOTE — HISTORY OF PRESENT ILLNESS
[de-identified] : 7/2021-Patient presented at the request of her PCP for leukocytosis, found on routine lab work by PCP-goes for lab work Q 3 months for Hemoglobin A1C. Also noted intermittent elevation of RBC. Erythropoietin level elevated at 43. CASE 2 mutation studies negative. BCR/ABL not detected. Has a h/o sleep apnea.\par  \par  \par  \par  \par   [de-identified] : Presents to start venofer infusion. First dose is today (1/4 infusions). Taking vitamin B12 PO daily.  Currently having her menses.-heavy Saw GI Dr. Link for abdominal pains-is scheduled for a colonoscopy next week Taking po vitamin D per PCP. Dr. Cotton monitoring breasts (h/o ADH). Has sleep apnea-does not use CPAP machine-had difficulty using it. No current c/o CP, SOB, palpitations, dizziness. No fevers, abnormal bruising. No c/o H/A, pruritis.

## 2024-09-26 NOTE — ASSESSMENT
[FreeTextEntry1] : Lab work done 9/13/24, reviewed. Hbg 9.0g/dL, Hct 31.4%, plt 509K, iron 18, % iron sat 4%, ferritin 22, W17=622, folate 10.6 #1) Chronic, intermittent leukocytosis/thrombocytosis dating back at least to 9/2019 upon review of old lab work available. H/O chronically intermittently elevated hematocrit noted as well. Suspect may have reactive component to elevated Hct. in this patient with h/o sleep apnea, and elevated erythropoietin. Intermittent abnormalities suggestive of chronic benign processes currently. CASE 2 mutation studies, BCR-ABL unremarkable 7/2021. --clinically stable currently --Continue to monitor CBC with differential.   #2) anemia/h/o decreased iron clinically appears associated with menstrual blood loss/abnormal vaginal bleeding. Have reviewed iron treatment options. Patient has felt she is tolerating p.o. iron, and has wished to continue it. Hemoglobinn, however, is decreased on most recent lab work available-discussed pro's/con's of parenteral iron in this situation. Potential side effects of IV venofer reviewed including but not limited to allergic reaction, nausea, headache. Patient gave written consent for Venofer. - First dose of venofer today, 9/25/24. Plan for four infusions then repeat labs. --continue GYN f/u. --f/u GI (Dr. Link) as planned-appointment scheduled for next week  #3) h/o elevated PTT-negative LA screen. Corrected with 50:50 mixing study with no factor 8, 9, 11, 12 deficiency. VW Ag/activity WNL 10/2021. No FH of a bleeding disorder, and patient has denied any personal h/o abnormal dar-procedural/operative bleeding. Follow-up PTT WNL 7/24/2023. Follow clinically.  #3) PO vitamin B 12 for h/o a vitamin B 12 level <400.   If hematologic abnormalities persist post improvement in iron deficiency, to further consider evaluation/BMB to r/o underlying, evolving BM disorder/MPD.  Patient was given the opportunity to ask questions, and her questions have been answered to her apparent satisfaction at this time. She is agreeable to recommended f/u.  --> Plan for Venofer 200 mg IVSS weekly x 4.  Vitamin B 12-1 mg PO daily. RTO ~5 weeks.

## 2024-09-26 NOTE — HISTORY OF PRESENT ILLNESS
[de-identified] : 7/2021-Patient presented at the request of her PCP for leukocytosis, found on routine lab work by PCP-goes for lab work Q 3 months for Hemoglobin A1C. Also noted intermittent elevation of RBC. Erythropoietin level elevated at 43. CASE 2 mutation studies negative. BCR/ABL not detected. Has a h/o sleep apnea.\par  \par  \par  \par  \par   [de-identified] : Presents to start venofer infusion. First dose is today (1/4 infusions). Taking vitamin B12 PO daily.  Currently having her menses.-heavy Saw GI Dr. Link for abdominal pains-is scheduled for a colonoscopy next week Taking po vitamin D per PCP. Dr. Cotton monitoring breasts (h/o ADH). Has sleep apnea-does not use CPAP machine-had difficulty using it. No current c/o CP, SOB, palpitations, dizziness. No fevers, abnormal bruising. No c/o H/A, pruritis.

## 2024-09-26 NOTE — HISTORY OF PRESENT ILLNESS
[de-identified] : 7/2021-Patient presented at the request of her PCP for leukocytosis, found on routine lab work by PCP-goes for lab work Q 3 months for Hemoglobin A1C. Also noted intermittent elevation of RBC. Erythropoietin level elevated at 43. CASE 2 mutation studies negative. BCR/ABL not detected. Has a h/o sleep apnea.\par  \par  \par  \par  \par   [de-identified] : Presents to start venofer infusion. First dose is today (1/4 infusions). Taking vitamin B12 PO daily.  Currently having her menses.-heavy Saw GI Dr. Link for abdominal pains-is scheduled for a colonoscopy next week Taking po vitamin D per PCP. Dr. Cotton monitoring breasts (h/o ADH). Has sleep apnea-does not use CPAP machine-had difficulty using it. No current c/o CP, SOB, palpitations, dizziness. No fevers, abnormal bruising. No c/o H/A, pruritis.

## 2024-09-26 NOTE — HISTORY OF PRESENT ILLNESS
[de-identified] : 7/2021-Patient presented at the request of her PCP for leukocytosis, found on routine lab work by PCP-goes for lab work Q 3 months for Hemoglobin A1C. Also noted intermittent elevation of RBC. Erythropoietin level elevated at 43. CASE 2 mutation studies negative. BCR/ABL not detected. Has a h/o sleep apnea.\par  \par  \par  \par  \par   [de-identified] : Presents to start venofer infusion. First dose is today (1/4 infusions). Taking vitamin B12 PO daily.  Currently having her menses.-heavy Saw GI Dr. Link for abdominal pains-is scheduled for a colonoscopy next week Taking po vitamin D per PCP. Dr. Cotton monitoring breasts (h/o ADH). Has sleep apnea-does not use CPAP machine-had difficulty using it. No current c/o CP, SOB, palpitations, dizziness. No fevers, abnormal bruising. No c/o H/A, pruritis.

## 2024-09-26 NOTE — PHYSICAL EXAM
[Normal] : affect appropriate [de-identified] : non-toxic appearing [de-identified] : breathing appeared unlabored [de-identified] : coloration appeared normal

## 2024-09-26 NOTE — ASSESSMENT
[FreeTextEntry1] : Lab work done 9/13/24, reviewed. Hbg 9.0g/dL, Hct 31.4%, plt 509K, iron 18, % iron sat 4%, ferritin 22, G69=262, folate 10.6 #1) Chronic, intermittent leukocytosis/thrombocytosis dating back at least to 9/2019 upon review of old lab work available. H/O chronically intermittently elevated hematocrit noted as well. Suspect may have reactive component to elevated Hct. in this patient with h/o sleep apnea, and elevated erythropoietin. Intermittent abnormalities suggestive of chronic benign processes currently. CASE 2 mutation studies, BCR-ABL unremarkable 7/2021. --clinically stable currently --Continue to monitor CBC with differential.   #2) anemia/h/o decreased iron clinically appears associated with menstrual blood loss/abnormal vaginal bleeding. Have reviewed iron treatment options. Patient has felt she is tolerating p.o. iron, and has wished to continue it. Hemoglobinn, however, is decreased on most recent lab work available-discussed pro's/con's of parenteral iron in this situation. Potential side effects of IV venofer reviewed including but not limited to allergic reaction, nausea, headache. Patient gave written consent for Venofer. - First dose of venofer today, 9/25/24. Plan for four infusions then repeat labs. --continue GYN f/u. --f/u GI (Dr. Link) as planned-appointment scheduled for next week  #3) h/o elevated PTT-negative LA screen. Corrected with 50:50 mixing study with no factor 8, 9, 11, 12 deficiency. VW Ag/activity WNL 10/2021. No FH of a bleeding disorder, and patient has denied any personal h/o abnormal dar-procedural/operative bleeding. Follow-up PTT WNL 7/24/2023. Follow clinically.  #3) PO vitamin B 12 for h/o a vitamin B 12 level <400.   If hematologic abnormalities persist post improvement in iron deficiency, to further consider evaluation/BMB to r/o underlying, evolving BM disorder/MPD.  Patient was given the opportunity to ask questions, and her questions have been answered to her apparent satisfaction at this time. She is agreeable to recommended f/u.  --> Plan for Venofer 200 mg IVSS weekly x 4.  Vitamin B 12-1 mg PO daily. RTO ~5 weeks.

## 2024-09-28 ENCOUNTER — EMERGENCY (EMERGENCY)
Facility: HOSPITAL | Age: 54
LOS: 1 days | Discharge: HOME | End: 2024-09-28
Attending: EMERGENCY MEDICINE | Admitting: EMERGENCY MEDICINE
Payer: COMMERCIAL

## 2024-09-28 VITALS
WEIGHT: 237 LBS | TEMPERATURE: 98 F | HEART RATE: 98 BPM | DIASTOLIC BLOOD PRESSURE: 62 MMHG | OXYGEN SATURATION: 100 % | HEIGHT: 66 IN | SYSTOLIC BLOOD PRESSURE: 120 MMHG | RESPIRATION RATE: 18 BRPM

## 2024-09-28 DIAGNOSIS — Z98.890 OTHER SPECIFIED POSTPROCEDURAL STATES: Chronic | ICD-10-CM

## 2024-09-28 LAB
ALBUMIN SERPL ELPH-MCNC: 2.9 G/DL — LOW (ref 3.3–5)
ALP SERPL-CCNC: 104 U/L — SIGNIFICANT CHANGE UP (ref 40–120)
ALT FLD-CCNC: 19 U/L — SIGNIFICANT CHANGE UP (ref 12–78)
ANION GAP SERPL CALC-SCNC: 8 MMOL/L — SIGNIFICANT CHANGE UP (ref 5–17)
ANISOCYTOSIS BLD QL: SLIGHT — SIGNIFICANT CHANGE UP
APPEARANCE UR: CLEAR — SIGNIFICANT CHANGE UP
AST SERPL-CCNC: 21 U/L — SIGNIFICANT CHANGE UP (ref 15–37)
BACTERIA # UR AUTO: ABNORMAL /HPF
BASOPHILS # BLD AUTO: 0.03 K/UL — SIGNIFICANT CHANGE UP (ref 0–0.2)
BASOPHILS NFR BLD AUTO: 0.3 % — SIGNIFICANT CHANGE UP (ref 0–2)
BILIRUB SERPL-MCNC: 0.3 MG/DL — SIGNIFICANT CHANGE UP (ref 0.2–1.2)
BILIRUB UR-MCNC: NEGATIVE — SIGNIFICANT CHANGE UP
BLD GP AB SCN SERPL QL: SIGNIFICANT CHANGE UP
BUN SERPL-MCNC: 6 MG/DL — LOW (ref 7–23)
CALCIUM SERPL-MCNC: 8.9 MG/DL — SIGNIFICANT CHANGE UP (ref 8.5–10.1)
CHLORIDE SERPL-SCNC: 104 MMOL/L — SIGNIFICANT CHANGE UP (ref 96–108)
CO2 SERPL-SCNC: 25 MMOL/L — SIGNIFICANT CHANGE UP (ref 22–31)
COLOR SPEC: YELLOW — SIGNIFICANT CHANGE UP
CREAT SERPL-MCNC: 0.66 MG/DL — SIGNIFICANT CHANGE UP (ref 0.5–1.3)
DIFF PNL FLD: ABNORMAL
EGFR: 104 ML/MIN/1.73M2 — SIGNIFICANT CHANGE UP
EOSINOPHIL # BLD AUTO: 0.05 K/UL — SIGNIFICANT CHANGE UP (ref 0–0.5)
EOSINOPHIL NFR BLD AUTO: 0.4 % — SIGNIFICANT CHANGE UP (ref 0–6)
EPI CELLS # UR: PRESENT
GLUCOSE SERPL-MCNC: 129 MG/DL — HIGH (ref 70–99)
GLUCOSE UR QL: NEGATIVE MG/DL — SIGNIFICANT CHANGE UP
HCT VFR BLD CALC: 25.6 % — LOW (ref 34.5–45)
HGB BLD-MCNC: 7.1 G/DL — LOW (ref 11.5–15.5)
HYPOCHROMIA BLD QL: SIGNIFICANT CHANGE UP
IMM GRANULOCYTES NFR BLD AUTO: 0.8 % — SIGNIFICANT CHANGE UP (ref 0–0.9)
KETONES UR-MCNC: 15 MG/DL
LACTATE SERPL-SCNC: 2.9 MMOL/L — HIGH (ref 0.7–2)
LEUKOCYTE ESTERASE UR-ACNC: ABNORMAL
LIDOCAIN IGE QN: 27 U/L — SIGNIFICANT CHANGE UP (ref 13–75)
LYMPHOCYTES # BLD AUTO: 1.43 K/UL — SIGNIFICANT CHANGE UP (ref 1–3.3)
LYMPHOCYTES # BLD AUTO: 12.1 % — LOW (ref 13–44)
MCHC RBC-ENTMCNC: 22.7 PG — LOW (ref 27–34)
MCHC RBC-ENTMCNC: 27.3 GM/DL — LOW (ref 32–36)
MCV RBC AUTO: 82.8 FL — SIGNIFICANT CHANGE UP (ref 80–100)
MONOCYTES # BLD AUTO: 0.69 K/UL — SIGNIFICANT CHANGE UP (ref 0–0.9)
MONOCYTES NFR BLD AUTO: 5.8 % — SIGNIFICANT CHANGE UP (ref 2–14)
NEUTROPHILS # BLD AUTO: 9.52 K/UL — HIGH (ref 1.8–7.4)
NEUTROPHILS NFR BLD AUTO: 80.6 % — HIGH (ref 43–77)
NITRITE UR-MCNC: NEGATIVE — SIGNIFICANT CHANGE UP
NRBC # BLD: 0 /100 WBCS — SIGNIFICANT CHANGE UP (ref 0–0)
OVALOCYTES BLD QL SMEAR: SLIGHT — SIGNIFICANT CHANGE UP
PH UR: 5.5 — SIGNIFICANT CHANGE UP (ref 5–8)
PLAT MORPH BLD: NORMAL — SIGNIFICANT CHANGE UP
PLATELET # BLD AUTO: 400 K/UL — SIGNIFICANT CHANGE UP (ref 150–400)
POLYCHROMASIA BLD QL SMEAR: SLIGHT — SIGNIFICANT CHANGE UP
POTASSIUM SERPL-MCNC: 3.8 MMOL/L — SIGNIFICANT CHANGE UP (ref 3.5–5.3)
POTASSIUM SERPL-SCNC: 3.8 MMOL/L — SIGNIFICANT CHANGE UP (ref 3.5–5.3)
PROT SERPL-MCNC: 7.6 G/DL — SIGNIFICANT CHANGE UP (ref 6–8.3)
PROT UR-MCNC: 30 MG/DL
RBC # BLD: 3.09 M/UL — LOW (ref 3.8–5.2)
RBC # FLD: 19.7 % — HIGH (ref 10.3–14.5)
RBC BLD AUTO: ABNORMAL
RBC CASTS # UR COMP ASSIST: >50 /HPF — HIGH (ref 0–4)
SODIUM SERPL-SCNC: 137 MMOL/L — SIGNIFICANT CHANGE UP (ref 135–145)
SP GR SPEC: 1.02 — SIGNIFICANT CHANGE UP (ref 1–1.03)
UROBILINOGEN FLD QL: 1 MG/DL — SIGNIFICANT CHANGE UP (ref 0.2–1)
WBC # BLD: 11.81 K/UL — HIGH (ref 3.8–10.5)
WBC # FLD AUTO: 11.81 K/UL — HIGH (ref 3.8–10.5)
WBC UR QL: 2 /HPF — SIGNIFICANT CHANGE UP (ref 0–5)

## 2024-09-28 PROCEDURE — 86901 BLOOD TYPING SEROLOGIC RH(D): CPT

## 2024-09-28 PROCEDURE — 99285 EMERGENCY DEPT VISIT HI MDM: CPT

## 2024-09-28 PROCEDURE — 74176 CT ABD & PELVIS W/O CONTRAST: CPT | Mod: 26,MC

## 2024-09-28 PROCEDURE — 36430 TRANSFUSION BLD/BLD COMPNT: CPT

## 2024-09-28 PROCEDURE — 96365 THER/PROPH/DIAG IV INF INIT: CPT

## 2024-09-28 PROCEDURE — 85025 COMPLETE CBC W/AUTO DIFF WBC: CPT

## 2024-09-28 PROCEDURE — 71046 X-RAY EXAM CHEST 2 VIEWS: CPT | Mod: 26

## 2024-09-28 PROCEDURE — 83690 ASSAY OF LIPASE: CPT

## 2024-09-28 PROCEDURE — 71046 X-RAY EXAM CHEST 2 VIEWS: CPT

## 2024-09-28 PROCEDURE — 96367 TX/PROPH/DG ADDL SEQ IV INF: CPT

## 2024-09-28 PROCEDURE — 80053 COMPREHEN METABOLIC PANEL: CPT

## 2024-09-28 PROCEDURE — 99284 EMERGENCY DEPT VISIT MOD MDM: CPT | Mod: 25

## 2024-09-28 PROCEDURE — 86850 RBC ANTIBODY SCREEN: CPT

## 2024-09-28 PROCEDURE — 36415 COLL VENOUS BLD VENIPUNCTURE: CPT

## 2024-09-28 PROCEDURE — 81001 URINALYSIS AUTO W/SCOPE: CPT

## 2024-09-28 PROCEDURE — 96361 HYDRATE IV INFUSION ADD-ON: CPT

## 2024-09-28 PROCEDURE — 83605 ASSAY OF LACTIC ACID: CPT

## 2024-09-28 PROCEDURE — 86900 BLOOD TYPING SEROLOGIC ABO: CPT

## 2024-09-28 PROCEDURE — P9016: CPT

## 2024-09-28 PROCEDURE — 74176 CT ABD & PELVIS W/O CONTRAST: CPT | Mod: MC

## 2024-09-28 PROCEDURE — 86923 COMPATIBILITY TEST ELECTRIC: CPT

## 2024-09-28 RX ORDER — ONDANSETRON 2 MG/ML
1 INJECTION, SOLUTION INTRAMUSCULAR; INTRAVENOUS
Qty: 30 | Refills: 0
Start: 2024-09-28

## 2024-09-28 RX ORDER — ACETAMINOPHEN 325 MG/1
1000 TABLET ORAL ONCE
Refills: 0 | Status: COMPLETED | OUTPATIENT
Start: 2024-09-28 | End: 2024-09-28

## 2024-09-28 RX ORDER — PIPERACILLIN SODIUM AND TAZOBACTAM SODIUM 3; .375 G/15ML; G/15ML
3.38 INJECTION, POWDER, FOR SOLUTION INTRAVENOUS ONCE
Refills: 0 | Status: COMPLETED | OUTPATIENT
Start: 2024-09-28 | End: 2024-09-28

## 2024-09-28 RX ORDER — SODIUM CHLORIDE 9 MG/ML
1000 INJECTION INTRAMUSCULAR; INTRAVENOUS; SUBCUTANEOUS ONCE
Refills: 0 | Status: COMPLETED | OUTPATIENT
Start: 2024-09-28 | End: 2024-09-28

## 2024-09-28 RX ORDER — AMOXICILLIN AND CLAVULANATE POTASSIUM 250; 125 MG/1; MG/1
875 TABLET, FILM COATED ORAL
Qty: 14 | Refills: 0
Start: 2024-09-28

## 2024-09-28 RX ORDER — METRONIDAZOLE 250 MG
1 TABLET ORAL
Qty: 21 | Refills: 0
Start: 2024-09-28

## 2024-09-28 RX ADMIN — PIPERACILLIN SODIUM AND TAZOBACTAM SODIUM 3.38 GRAM(S): 3; .375 INJECTION, POWDER, FOR SOLUTION INTRAVENOUS at 22:04

## 2024-09-28 RX ADMIN — PIPERACILLIN SODIUM AND TAZOBACTAM SODIUM 200 GRAM(S): 3; .375 INJECTION, POWDER, FOR SOLUTION INTRAVENOUS at 21:34

## 2024-09-28 RX ADMIN — ACETAMINOPHEN 400 MILLIGRAM(S): 325 TABLET ORAL at 18:24

## 2024-09-28 RX ADMIN — SODIUM CHLORIDE 1000 MILLILITER(S): 9 INJECTION INTRAMUSCULAR; INTRAVENOUS; SUBCUTANEOUS at 20:30

## 2024-09-28 RX ADMIN — Medication 450 MILLILITER(S): at 18:41

## 2024-09-28 RX ADMIN — ACETAMINOPHEN 1000 MILLIGRAM(S): 325 TABLET ORAL at 19:00

## 2024-09-28 RX ADMIN — SODIUM CHLORIDE 1000 MILLILITER(S): 9 INJECTION INTRAMUSCULAR; INTRAVENOUS; SUBCUTANEOUS at 18:23

## 2024-09-28 RX ADMIN — ACETAMINOPHEN 1000 MILLIGRAM(S): 325 TABLET ORAL at 18:40

## 2024-09-28 NOTE — ED ADULT NURSE NOTE - OBJECTIVE STATEMENT
54 y.o female pt presents to ED with chief complaint of abd pain. Pt states they have been receiving iv transfusions due to menorrhagia for 3 weeks. Per pt pain comes and goes.

## 2024-09-28 NOTE — ED PROVIDER NOTE - CARE PLAN
Principal Discharge DX:	Abdominal pain  Secondary Diagnosis:	Anemia   1 Principal Discharge DX:	Sigmoid diverticulitis  Secondary Diagnosis:	Anemia

## 2024-09-28 NOTE — ED PROVIDER NOTE - CARE PROVIDER_API CALL
Milan Alejandre  Gastroenterology  [address] [place] [address]  Phone: [phone]  Fax: [phone]  Follow Up Time:     Venancio Lagos  Internal Medicine  [address] [facility], [address]  [place] [address]  Phone: [phone]  Fax: [phone]  Follow Up Time:

## 2024-09-28 NOTE — ED PROVIDER NOTE - PROGRESS NOTE DETAILS
Lab results were reviewed at the bedside, wet read of the chest x-ray was reviewed at the bedside.  Patient is aware she is anemic.  Her hemoglobin dropped from 9 which was done at an outpatient to 7.1.  At this point we reviewed the risks benefits and alternatives to a blood transfusion.  Given the fact she is actively bleeding and drop in her hemoglobin and hematocrit.  Awaiting CAT scan imagery.  Which should be occurring in approximately half an hour patient is otherwise comfortable and nontoxic-appearing.  And Opportunity for questions was had regarding blood transfusions.  Her  is a blood donor and was able to describe also his experiences. CT resulted.  I had an extensive discussion with the patient and her  regarding the results.  Copies of the CAT scan were provided.  This includes need for follow-up with GI which patient stated she has plans for colonoscopy on Friday.  I offered admission versus discharge.  Patient declined admission but we will continue to monitor her during her blood transfusion.  If patient changes or becomes unwell will admit for the diverticulitis and anemia with abdominal pain.  Otherwise will discharge home with outpatient follow-up, clear liquid diet and instructions to return if symptoms worsen.  Medication sent to the pharmacy.

## 2024-09-28 NOTE — ED PROVIDER NOTE - GASTROINTESTINAL, MLM
Prior Authorization Retail Medication Request    Medication/Dose: modafinil (PROVIGIL) 200 MG tablet  ICD code (if different than what is on RX):    Previously Tried and Failed:   Rationale:      Insurance Name: [place] ([employer])  Insurance ID: [id number]  BIN: [id number]  PCN: [id number]  GRP: [id number]    Pharmacy Information (if different than what is on RX)  [facility]  [facility] [address] [place] [facility]    Phone: [phone]         Abdomen soft, non-tender, no guarding no rebound no cvat no suprapubic discomfort to palp

## 2024-09-28 NOTE — ED PROVIDER NOTE - OBJECTIVE STATEMENT
Patient is a 54-year-old female with a history of obesity hyperlipidemia HTN, DM, anemia undergoing recent iron infusion 3 days ago.  For recurrent and heavy menstruation.  She also has a history of diverticulosis for which she follows with GI.  Her primary care physician is Dr. Venancio Sinclair, and heme is dr Alfonso she is a social drinker not a smoker.  No significant surgical history reported.  She presents emergency room with sudden onset of severe abdominal pain in the lower abdomen in the suprapubic region that is nonradiating but on and off since 7:00 last night.  No cough fever chills.  No nausea vomiting diarrhea.  Brought in by her  for evaluation and care.

## 2024-09-28 NOTE — ED PROVIDER NOTE - NSFOLLOWUPINSTRUCTIONS_ED_ALL_ED_FT
Augmentin 1 pill twice a day  Flagyl 1 pill 3 times a day  Clear liquid diet advance slowly as tolerated to  Bananas Rice Tea and Toast (with margarine or jam)  then advance to Baked and boiled (eggs, pasta, chicken)  once tolerated you may advance slowly to regular  NO RED FOODS   NO fatty fried foods, no milk or mild products, no tomato, spice, mint, tobacco, alcohol, caffeine  Stay well hydrated  Prompt follow up with your GI doctor is essential FOLLOW UP WITH YOUR HEMATOLOGIST LET THEM KNOW YOU RECEIVED A BLOOD TRANSFUSION  FOLLOW UP WITH YOUR PRIMARY CARE DOCTOR  if given zofran, use this every 8 hours for nausea.  return for worsening symptoms or any problems/concerns Augmentin 1 pill twice a day  Flagyl 1 pill 3 times a day  Clear liquid diet advance slowly as tolerated to  Bananas Rice Tea and Toast (with margarine or jam)  then advance to Baked and boiled (eggs, pasta, chicken)  once tolerated you may advance slowly to regular  NO RED FOODS   NO fatty fried foods, no milk or mild products, no tomato, spice, mint, tobacco, alcohol, caffeine  Stay well hydrated  Prompt follow up with your GI doctor is essential FOLLOW UP WITH YOUR HEMATOLOGIST LET THEM KNOW YOU RECEIVED A BLOOD TRANSFUSION  FOLLOW UP WITH YOUR PRIMARY CARE DOCTOR  if given zofran, use this every 8 hours for nausea.  return for worsening symptoms or any problems/concerns  Diverticulitis  Body outline showing the stomach and intestines, with a close-up of diverticula on the large intestine.  Diverticulitis is when small pouches in your colon get infected or swollen. This causes pain in your belly (abdomen) and watery poop (diarrhea).    The small pouches are called diverticula. They may form if you have a condition called diverticulosis.    What are the causes?  You may get this condition if poop (stool) gets trapped in the pouches in your colon. The poop lets germs (bacteria) grow. This causes an infection.    What increases the risk?  You are more likely to get this condition if you have small pouches in your colon. You are also more likely to get it if:  You are overweight or very overweight (obese).  You do not exercise enough.  You drink alcohol.  You smoke.  You eat a lot of red meat, like beef, pork, or lamb.  You do not eat enough fiber.  You are older than 40 years of age.  What are the signs or symptoms?  Pain in your belly. Pain is often on the left side, but it may be felt in other spots too.  Fever and chills.  Feeling like you may vomit.  Vomiting.  Having cramps.  Feeling full.  Changes in how often you poop.  Blood in your poop.  How is this treated?  Most cases are treated at home. You may be told to:  Take over-the-counter pain medicines.  Only eat and drink clear liquids.  Take antibiotics.  Rest.  Very bad cases may need to be treated at a hospital. Treatment may include:  Not eating or drinking.  Taking pain medicines.  Getting antibiotics through an IV tube.  Getting fluid and food through an IV tube.  Having surgery.  When you are feeling better, you may need to have a test to look at your colon (colonoscopy).    Follow these instructions at home:  Medicines    Take over-the-counter and prescription medicines only as told by your doctor. These include:  Fiber pills.  Probiotics.  Medicines to make your poop soft (stool softeners).  If you were prescribed antibiotics, take them as told by your doctor. Do not stop taking them even if you start to feel better.  Ask your doctor if you should avoid driving or using machines while you are taking your medicine.  Eating and drinking    Pear, berries, artichoke, and beans.  Follow the diet told by your doctor. You may need to only eat and drink liquids.  When you feel better, you may be able to eat more foods. You may also be told to eat a lot of fiber. Fiber helps you poop. Foods with fiber include berries, beans, lentils, and green vegetables.  Try not to eat red meat.  General instructions    Do not smoke or use any products that contain nicotine or tobacco. If you need help quitting, ask your doctor.  Exercise 3 or more times a week. Try to go for 30 minutes each time. Exercise enough to sweat and make your heart beat faster.  Contact a doctor if:  Your pain gets worse.  You are not pooping like normal.  Your symptoms do not get better.  Your symptoms get worse very fast.  You have a fever.  You vomit more than one time.  You have poop that is:  Bloody.  Black.  Tarry.  This information is not intended to replace advice given to you by your health care provider. Make sure you discuss any questions you have with your health care provider.  Blood Transfusion, Adult  A blood transfusion is a procedure in which you receive blood through an IV tube. You may need this procedure because of:  A bleeding disorder.  An illness.  An injury.  A surgery.  The blood may come from someone else (a donor). You may also be able to donate blood for yourself before a surgery. The blood given in a transfusion may be made up of different types of cells. You may get:  Red blood cells. These carry oxygen to the cells in the body.  Platelets. These help your blood to clot.  Plasma. This is the liquid part of your blood. It carries proteins and other substances through the body.  White blood cells. These help you fight infections.  If you have a clotting disorder, you may also get other types of blood products.    Depending on the type of blood product, this procedure may take 1–4 hours to complete.    Tell your doctor about:  Any bleeding problems you have.  Any reactions you have had during a blood transfusion in the past.  Any allergies you have.  All medicines you are taking, including vitamins, herbs, eye drops, creams, and over-the-counter medicines.  Any surgeries you have had.  Any medical conditions you have.  Whether you are pregnant or may be pregnant.  What are the risks?  Talk with your health care provider about risks.  The most common problems include:  A mild allergic reaction. This includes red, swollen areas of skin (hives) and itching.  Fever or chills. This may be the body's response to new blood cells received. This may happen during or up to 4 hours after the transfusion.  More serious problems may include:  A serious allergic reaction. This includes breathing trouble or swelling around the face and lips.  Too much fluid in the lungs. This may cause breathing problems.  Lung injury. This causes breathing trouble and low oxygen in the blood. This can happen within hours of the transfusion or days later.  Too much iron. This can happen after getting many blood transfusions over a period of time.  An infection or virus passed through the blood. This is rare. Donated blood is carefully tested before it is given.  Your body's defense system (immune system) trying to attack the new blood cells. This is rare. Symptoms may include fever, chills, nausea, low blood pressure, and low back or chest pain.  Donated cells attacking healthy tissues. This is rare.  What happens before the procedure?  You will have a blood test to find out your blood type. The test also finds out what type of blood your body will accept and matches it to the donor type.  If you are going to have a planned surgery, you may be able to donate your own blood. This may be done in case you need a transfusion.  You will have your temperature, blood pressure, and pulse checked.  You may receive medicine to help prevent an allergic reaction. This may be done if you have had a reaction to a transfusion before. This medicine may be given to you by mouth or through an IV tube.  What happens during the procedure?  A person receiving a blood transfusion through a vein in the arm.  An IV tube will be put into one of your veins.  The bag of blood will be attached to your IV tube. Then, the blood will enter through your vein.  Your temperature, blood pressure, and pulse will be checked often. This is done to find early signs of a transfusion reaction.  Tell your nurse right away if you have any of these symptoms:  Shortness of breath or trouble breathing.  Chest or back pain.  Fever or chills.  Red, swollen areas of skin or itching.  If you have any signs or symptoms of a reaction, your transfusion will be stopped. You may also be given medicine.  When the transfusion is finished, your IV tube will be taken out.  Pressure may be put on the IV site for a few minutes.  A bandage (dressing) will be put on the IV site.  The procedure may vary among doctors and hospitals.    What happens after the procedure?  You will be monitored until you leave the hospital or clinic. This includes checking your temperature, blood pressure, pulse, breathing rate, and blood oxygen level.  Your blood may be tested to see how you have responded to the transfusion.  You may be warmed with fluids or blankets. This is done to keep the temperature of your body normal.  If you have your procedure in an outpatient setting, you will be told whom to contact to report any reactions.  Where to find more information  Visit the [employer]: [email or web address]  Summary  A blood transfusion is a procedure in which you receive blood through an IV tube.  The blood you are given may be made up of different blood cells. You may receive red blood cells, platelets, plasma, or white blood cells.  Your temperature, blood pressure, and pulse will be checked often.  After the procedure, your blood may be tested to see how you have responded.  This information is not intended to replace advice given to you by your health care provider. Make sure you discuss any questions you have with your health care provider.    Document Revised: 03/17/2023 Document Reviewed: 03/17/2023

## 2024-09-28 NOTE — ED PROVIDER NOTE - CLINICAL SUMMARY MEDICAL DECISION MAKING FREE TEXT BOX
Patient is a 54-year-old female with a history of diverticulosis, recurrent menometrorrhagia, requiring iron infusion for anemia.  Her last hemoglobin was 9.  Presents emergency room with suprapubic abdominal discomfort since 7 PM on and off.  Plan of care includes rule out diverticulitis rule out kidney stone rule out other intra-abdominal pathology.  The abdominal exam is normal.  Rule out UTI.  Patient currently has hematuria by report, will send off urine cultures.  Tylenol for pain IV.  CT imaging with oral contrast only as patient is allergic to IV.  And disposition accordingly.  This chart was made with dictation software and may contain typographical errors.

## 2024-09-28 NOTE — ED ADULT NURSE NOTE - ED STAT RN HANDOFF DETAILS
Report given to BEAN Velez. Pt. currently being infused w/ one unit of PRBC will finish @ 0100, instructed Rosie to finish blood transfusion paperwork.

## 2024-09-28 NOTE — ED ADULT NURSE NOTE - ED STAT RN HANDOFF WHERE
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     
ED

## 2024-09-28 NOTE — ED PROVIDER NOTE - PATIENT PORTAL LINK FT
You can access the FollowMyHealth Patient Portal offered by [facility] by registering at the following website: [email or web address]. By joining [facility]’s FollowMyHealth portal, you will also be able to view your health information using other applications (apps) compatible with our system.

## 2024-09-28 NOTE — ED PROVIDER NOTE - SKIN, MLM
Pt returned your call
Returned call, left message again
Skin normal color for race, warm, dry and intact. No evidence of rash.

## 2024-09-28 NOTE — ED ADULT NURSE NOTE - NSICDXPASTMEDICALHX_GEN_ALL_CORE_FT
PAST MEDICAL HISTORY:  Abnormal uterine and vaginal bleeding     Anemia     Basal cell cancer removed upper back    Diabetes type 2    Gestational diabetes     HLD (hyperlipidemia)     HTN (hypertension)     Obesity     LAURO (obstructive sleep apnea)     Ovarian cyst      Statement Selected

## 2024-09-28 NOTE — ED PROVIDER NOTE - CARE PROVIDERS DIRECT ADDRESSES
,DirectAddress_Unknown,[email or web address]

## 2024-09-29 VITALS
DIASTOLIC BLOOD PRESSURE: 80 MMHG | SYSTOLIC BLOOD PRESSURE: 117 MMHG | RESPIRATION RATE: 17 BRPM | HEART RATE: 84 BPM | TEMPERATURE: 98 F | OXYGEN SATURATION: 100 %

## 2024-09-29 NOTE — ED ADULT NURSE REASSESSMENT NOTE - NS ED NURSE REASSESS COMMENT FT1
Pt received alert. Pt sitting comfortably in stretcher. No c/o pain or discomfort. Hemodynamically stable on monitor. NAD noted. Blood infusing. Safety maintained.

## 2024-10-01 ENCOUNTER — INPATIENT (INPATIENT)
Facility: HOSPITAL | Age: 54
LOS: 1 days | Discharge: HOME | DRG: 744 | End: 2024-10-03
Attending: HOSPITALIST | Admitting: INTERNAL MEDICINE
Payer: COMMERCIAL

## 2024-10-01 VITALS
HEART RATE: 101 BPM | SYSTOLIC BLOOD PRESSURE: 109 MMHG | OXYGEN SATURATION: 100 % | TEMPERATURE: 97 F | WEIGHT: 227.96 LBS | RESPIRATION RATE: 16 BRPM | DIASTOLIC BLOOD PRESSURE: 53 MMHG | HEIGHT: 66 IN

## 2024-10-01 DIAGNOSIS — Z98.890 OTHER SPECIFIED POSTPROCEDURAL STATES: Chronic | ICD-10-CM

## 2024-10-01 DIAGNOSIS — N93.9 ABNORMAL UTERINE AND VAGINAL BLEEDING, UNSPECIFIED: ICD-10-CM

## 2024-10-01 LAB
ALBUMIN SERPL ELPH-MCNC: 2.9 G/DL — LOW (ref 3.3–5)
ALP SERPL-CCNC: 83 U/L — SIGNIFICANT CHANGE UP (ref 40–120)
ALT FLD-CCNC: 22 U/L — SIGNIFICANT CHANGE UP (ref 12–78)
ANION GAP SERPL CALC-SCNC: 11 MMOL/L — SIGNIFICANT CHANGE UP (ref 5–17)
AST SERPL-CCNC: 27 U/L — SIGNIFICANT CHANGE UP (ref 15–37)
BASOPHILS # BLD AUTO: 0.02 K/UL — SIGNIFICANT CHANGE UP (ref 0–0.2)
BASOPHILS NFR BLD AUTO: 0.2 % — SIGNIFICANT CHANGE UP (ref 0–2)
BILIRUB SERPL-MCNC: 0.3 MG/DL — SIGNIFICANT CHANGE UP (ref 0.2–1.2)
BUN SERPL-MCNC: 3 MG/DL — LOW (ref 7–23)
CALCIUM SERPL-MCNC: 8.4 MG/DL — LOW (ref 8.5–10.1)
CHLORIDE SERPL-SCNC: 100 MMOL/L — SIGNIFICANT CHANGE UP (ref 96–108)
CO2 SERPL-SCNC: 24 MMOL/L — SIGNIFICANT CHANGE UP (ref 22–31)
CREAT SERPL-MCNC: 0.71 MG/DL — SIGNIFICANT CHANGE UP (ref 0.5–1.3)
EGFR: 100 ML/MIN/1.73M2 — SIGNIFICANT CHANGE UP
EOSINOPHIL # BLD AUTO: 0.06 K/UL — SIGNIFICANT CHANGE UP (ref 0–0.5)
EOSINOPHIL NFR BLD AUTO: 0.7 % — SIGNIFICANT CHANGE UP (ref 0–6)
GLUCOSE SERPL-MCNC: 139 MG/DL — HIGH (ref 70–99)
HCT VFR BLD CALC: 23.4 % — LOW (ref 34.5–45)
HGB BLD-MCNC: 6.7 G/DL — CRITICAL LOW (ref 11.5–15.5)
IMM GRANULOCYTES NFR BLD AUTO: 0.5 % — SIGNIFICANT CHANGE UP (ref 0–0.9)
LYMPHOCYTES # BLD AUTO: 1.2 K/UL — SIGNIFICANT CHANGE UP (ref 1–3.3)
LYMPHOCYTES # BLD AUTO: 14.6 % — SIGNIFICANT CHANGE UP (ref 13–44)
MCHC RBC-ENTMCNC: 23.5 PG — LOW (ref 27–34)
MCHC RBC-ENTMCNC: 28.6 GM/DL — LOW (ref 32–36)
MCV RBC AUTO: 82.1 FL — SIGNIFICANT CHANGE UP (ref 80–100)
MONOCYTES # BLD AUTO: 0.47 K/UL — SIGNIFICANT CHANGE UP (ref 0–0.9)
MONOCYTES NFR BLD AUTO: 5.7 % — SIGNIFICANT CHANGE UP (ref 2–14)
NEUTROPHILS # BLD AUTO: 6.42 K/UL — SIGNIFICANT CHANGE UP (ref 1.8–7.4)
NEUTROPHILS NFR BLD AUTO: 78.3 % — HIGH (ref 43–77)
NRBC # BLD: 0 /100 WBCS — SIGNIFICANT CHANGE UP (ref 0–0)
PLATELET # BLD AUTO: 373 K/UL — SIGNIFICANT CHANGE UP (ref 150–400)
POTASSIUM SERPL-MCNC: 3.3 MMOL/L — LOW (ref 3.5–5.3)
POTASSIUM SERPL-SCNC: 3.3 MMOL/L — LOW (ref 3.5–5.3)
PROT SERPL-MCNC: 7 G/DL — SIGNIFICANT CHANGE UP (ref 6–8.3)
RBC # BLD: 2.85 M/UL — LOW (ref 3.8–5.2)
RBC # FLD: 18.5 % — HIGH (ref 10.3–14.5)
SODIUM SERPL-SCNC: 135 MMOL/L — SIGNIFICANT CHANGE UP (ref 135–145)
WBC # BLD: 8.21 K/UL — SIGNIFICANT CHANGE UP (ref 3.8–10.5)
WBC # FLD AUTO: 8.21 K/UL — SIGNIFICANT CHANGE UP (ref 3.8–10.5)

## 2024-10-01 PROCEDURE — 99285 EMERGENCY DEPT VISIT HI MDM: CPT

## 2024-10-01 PROCEDURE — 99222 1ST HOSP IP/OBS MODERATE 55: CPT | Mod: GC

## 2024-10-01 RX ORDER — ONDANSETRON HCL/PF 4 MG/2 ML
4 VIAL (ML) INJECTION ONCE
Refills: 0 | Status: COMPLETED | OUTPATIENT
Start: 2024-10-01 | End: 2024-10-01

## 2024-10-01 RX ADMIN — Medication 1 TABLET(S): at 23:24

## 2024-10-01 RX ADMIN — Medication 4 MILLIGRAM(S): at 23:24

## 2024-10-01 RX ADMIN — Medication 500 MILLIGRAM(S): at 23:24

## 2024-10-01 NOTE — H&P ADULT - ASSESSMENT
# severe anemia sec to vaginal bleeding   # acute on chronic anemia requiring iron transfusion     - on admission Hgb of 6.8   - pt received 1 U of PRBCs on 9/28   -  gave 1000 mg IV of Tranexamic acid , no h/o of blood clots   - INR and PTT ordered to f/u   - f/u H/H after 2 hrs of transfusion around 5 am   - hold BP med given BP borderline likely sec to vaginal bleeding   - s/p blood transfusion after transfusion starting LR @ 85 ml/hr for 10 hrs and to re assessed in am   - GYN consulted to f/u further recommendations , per her outpatient GYN plan for this week appointment and discussing possible hysterectomy   - hematology consulted Dr. moore  - to avoid NSAIDs    # acute diverticulitis   - pt currently on Augmentin and flagyl since 9/30 and stated the significant improvement   - c/w Augmentin and flagyl for now to c/w for total 10 days  - patient scheduled already an appointment with her outpatient GI for planned colonoscopy , showed be after current acute attack in 4-6 weeks   - pt no leukocytosis or fever , no tender on exam , no nausea or vomiting currently      # hypokalemia   - K 3.3   - replaced with KCL 40 mEQ  - f/u repeat in am and Mg and replace as needed     #HTN/ HLD  -hold ramipril as above mentioned   - c/w home rosuvastatin     # DM  pt at home on Xultophy 50 U at bedtime  but noticed that her BG in am was low in am in 90s also held as her premeal were also in 140s   - for now will hold tonight lantus   - starting moderate SS insulin   - adjust insulin regimen per BG level   - target during admission 140-180       DVT ppx: SCD  full code  carb consistent diet # severe anemia sec to vaginal bleeding   # acute on chronic anemia requiring iron transfusion     - on admission Hgb of 6.8   - pt received 1 U of PRBCs on 9/28   -  gave 1000 mg IV of Tranexamic acid , no h/o of blood clots   - INR and PTT ordered to f/u   - f/u H/H after 2 hrs of transfusion around 5 am   - hold BP med given BP borderline likely sec to vaginal bleeding   - s/p blood transfusion after transfusion starting LR @ 85 ml/hr for 10 hrs and to re assessed in am   - GYN consulted to f/u further recommendations , per her outpatient GYN plan for this week appointment and discussing possible hysterectomy   - hematology consulted Dr. moore  - to avoid NSAIDs    # acute diverticulitis   - pt currently on Augmentin and flagyl since 9/30 and stated the significant improvement   - c/w Augmentin and flagyl for now to c/w for total 10 days , pt sometimes just take zofran with antibiotics as they give her some nausea , EKG check  ordered PO Zofran PRN  - patient scheduled already an appointment with her outpatient GI for planned colonoscopy , showed be after current acute attack in 4-6 weeks   - pt no leukocytosis or fever , no tender on exam , no nausea or vomiting currently      # hypokalemia   - K 3.3   - replaced with KCL 40 mEQ  - f/u repeat in am and Mg and replace as needed     #HTN/ HLD  -hold ramipril as above mentioned   - c/w home rosuvastatin     # DM  pt at home on Xultophy 50 U at bedtime  but noticed that her BG in am was low in am in 90s also held as her premeal were also in 140s   - for now will hold tonight lantus   - starting moderate SS insulin   - adjust insulin regimen per BG level   - target during admission 140-180       DVT ppx: SCD  full code  carb consistent diet

## 2024-10-01 NOTE — H&P ADULT - NSHPPHYSICALEXAM_GEN_ALL_CORE
GEN:  middle aged female, NAD  HEENT: NC/AT, no icterus   lungs: equal air entry b/l, no wheezing or Rhonchi  heart: s1/s2 present ,no murmur , rubs or gallops  abd: soft , not tender or distended , BS+  ext: trace b/l pedal edema , no cyanosis   neuro: AOx3, no focal deficit

## 2024-10-01 NOTE — ED ADULT TRIAGE NOTE - CHIEF COMPLAINT QUOTE
Patient came in ambulatory in triage reports having vaginal bleeding for the past 2 weeks and has been heavy for the last 2 days. Patient states she has an appointment with her Gynecologist on Monday. H/o anemia and had blood transfusion.

## 2024-10-01 NOTE — ED ADULT NURSE NOTE - OBJECTIVE STATEMENT
Received pt awake and alert, c/o heavy vaginal bleeding, Hx anemia, received first iron infusion this week, feeling off today.

## 2024-10-01 NOTE — ED PROVIDER NOTE - NSICDXPASTSURGICALHX_GEN_ALL_CORE_FT
PAST SURGICAL HISTORY:  H/O benign breast biopsy     H/O umbilical hernia repair     S/P anal fissurectomy 10 yrs ago    S/P  section twice, ,     S/P lumpectomy, left breast      No

## 2024-10-01 NOTE — H&P ADULT - HISTORY OF PRESENT ILLNESS
54 Y.O.F with PMH of iron deficiency anemia , DM , HTN, recurrent vaginal bleeding s/p D & C in november of 2023   patient presented to ED on 9/28 for abdominal pain , at that time patient was found on CT to have acute diverticulitis and was discharged home on Augmentin and flagyl , also during same visit patient was found to have low Hgb of 7.1 and told ED attending that she is currently having the vaginal bleeding   so in ED transfuse 1 U of PRBCs prior to discharge home   patient presented again to ED tonight on 10 /1 as she continue to have the vaginal bleeding currently for 2 weeks , need to change a pad about every 2 hrs , seen clots and was starting to feel tired   denied any SOB or chest pain   no nausea , vomiting or fever   patient stated that her abdominal pain resolved after antibiotics she was given by ED    GYN Dr. Sullivan per ED and stated will come and evaluate pt in am

## 2024-10-01 NOTE — ED ADULT NURSE NOTE - NSFALLUNIVINTERV_ED_ALL_ED
Bed/Stretcher in lowest position, wheels locked, appropriate side rails in place/Call bell, personal items and telephone in reach/Instruct patient to call for assistance before getting out of bed/chair/stretcher/Non-slip footwear applied when patient is off stretcher/[place] to call system/Physically safe environment - no spills, clutter or unnecessary equipment/Purposeful proactive rounding/Room/bathroom lighting operational, light cord in reach

## 2024-10-01 NOTE — ED PROVIDER NOTE - CLINICAL SUMMARY MEDICAL DECISION MAKING FREE TEXT BOX
54-year-old female with a history of hypertension, hyperlipidemia, diabetes, chronic vaginal bleeding presents with recently seen in the ED for abdominal pain, found to have diverticulitis and has been on antibiotics.  The patient states that her abdominal pain is now resolved.  However the patient has been having some chronic vaginal bleeding, has had talks about having a hysterectomy.  The patient was seen 3 days ago, and was found to be anemic as a result, and was given a transfusion after hemoglobin of 7.1.  The patient has been having some increased bleeding over the past few days, and is feeling weak again as if she is anemic.  No acute chest pain.  No shortness of breath.  No vomiting or diarrhea.  No aggravating or alleviating factors otherwise noted.  No other acute complaints.  Exam: Nontoxic, well-appearing.  Normal respiratory effort.  CTA BL, no W/R/R.  Normal cardiac exam.  Abdomen soft, nontender, nondistended.  No HSM.  No CVAT.  Positive mucous membrane pallor.  No C/C/E.  Nonfocal neurologic exam.  No other acute findings on exam.  Acute vaginal bleeding with worsening anemia.  Will transfuse, GYN consultation, admission

## 2024-10-01 NOTE — ED PROVIDER NOTE - OBJECTIVE STATEMENT
54 F hx ALLEN (was on PO iron, just started IV iron), abnormal uterine bleeding, DM, HLD, HTN c/o vaginal bleeding. Pt reports hx heavy vaginal bleeding, will bleed for 2-3 weeks at a time. Follows with gyn Dr. Cage. Hesitant to get hysterectomy due to scar tissue from prior c-sections and mesh from hernia repair. Was seen in this ED 3 days ago due to abdominal pain. Diagnosed with diverticulitis and started on augmentin and flagyl with improvement. Saw her GI Dr. Link yesterday. Pt denies rectal bleeding or dark stools. Received 1 unit of blood prior to discharge. States bleeding got heavier at home and today felt cold and weak, concerned that hemoglobin dropped. Denies f/c, cp, sob, vomiting.    pmd oren  gyn edilma smith

## 2024-10-02 ENCOUNTER — APPOINTMENT (OUTPATIENT)
Dept: INFUSION THERAPY | Facility: HOSPITAL | Age: 54
End: 2024-10-02

## 2024-10-02 DIAGNOSIS — N93.9 ABNORMAL UTERINE AND VAGINAL BLEEDING, UNSPECIFIED: ICD-10-CM

## 2024-10-02 DIAGNOSIS — D50.9 IRON DEFICIENCY ANEMIA, UNSPECIFIED: ICD-10-CM

## 2024-10-02 LAB
A1C WITH ESTIMATED AVERAGE GLUCOSE RESULT: 7 % — HIGH (ref 4–5.6)
ALBUMIN SERPL ELPH-MCNC: 2.9 G/DL — LOW (ref 3.3–5)
ALP SERPL-CCNC: 87 U/L — SIGNIFICANT CHANGE UP (ref 40–120)
ALT FLD-CCNC: 24 U/L — SIGNIFICANT CHANGE UP (ref 12–78)
ANION GAP SERPL CALC-SCNC: 8 MMOL/L — SIGNIFICANT CHANGE UP (ref 5–17)
APTT BLD: 35.8 SEC — HIGH (ref 24.5–35.6)
AST SERPL-CCNC: 37 U/L — SIGNIFICANT CHANGE UP (ref 15–37)
BILIRUB SERPL-MCNC: 0.4 MG/DL — SIGNIFICANT CHANGE UP (ref 0.2–1.2)
BUN SERPL-MCNC: 3 MG/DL — LOW (ref 7–23)
CALCIUM SERPL-MCNC: 9 MG/DL — SIGNIFICANT CHANGE UP (ref 8.5–10.1)
CHLORIDE SERPL-SCNC: 103 MMOL/L — SIGNIFICANT CHANGE UP (ref 96–108)
CO2 SERPL-SCNC: 24 MMOL/L — SIGNIFICANT CHANGE UP (ref 22–31)
CREAT SERPL-MCNC: 0.56 MG/DL — SIGNIFICANT CHANGE UP (ref 0.5–1.3)
EGFR: 108 ML/MIN/1.73M2 — SIGNIFICANT CHANGE UP
ESTIMATED AVERAGE GLUCOSE: 154 MG/DL — HIGH (ref 68–114)
GLUCOSE SERPL-MCNC: 166 MG/DL — HIGH (ref 70–99)
HCG UR QL: NEGATIVE — SIGNIFICANT CHANGE UP
HCT VFR BLD CALC: 27 % — LOW (ref 34.5–45)
HGB BLD-MCNC: 8.3 G/DL — LOW (ref 11.5–15.5)
INR BLD: 1.27 RATIO — HIGH (ref 0.85–1.16)
MCHC RBC-ENTMCNC: 25 PG — LOW (ref 27–34)
MCHC RBC-ENTMCNC: 30.7 GM/DL — LOW (ref 32–36)
MCV RBC AUTO: 81.3 FL — SIGNIFICANT CHANGE UP (ref 80–100)
NRBC # BLD: 0 /100 WBCS — SIGNIFICANT CHANGE UP (ref 0–0)
PLATELET # BLD AUTO: 392 K/UL — SIGNIFICANT CHANGE UP (ref 150–400)
POTASSIUM SERPL-MCNC: 3.9 MMOL/L — SIGNIFICANT CHANGE UP (ref 3.5–5.3)
POTASSIUM SERPL-SCNC: 3.9 MMOL/L — SIGNIFICANT CHANGE UP (ref 3.5–5.3)
PROT SERPL-MCNC: 7.1 G/DL — SIGNIFICANT CHANGE UP (ref 6–8.3)
PROTHROM AB SERPL-ACNC: 15 SEC — HIGH (ref 9.9–13.4)
RBC # BLD: 3.32 M/UL — LOW (ref 3.8–5.2)
RBC # FLD: 17.8 % — HIGH (ref 10.3–14.5)
SODIUM SERPL-SCNC: 135 MMOL/L — SIGNIFICANT CHANGE UP (ref 135–145)
WBC # BLD: 9.84 K/UL — SIGNIFICANT CHANGE UP (ref 3.8–10.5)
WBC # FLD AUTO: 9.84 K/UL — SIGNIFICANT CHANGE UP (ref 3.8–10.5)

## 2024-10-02 PROCEDURE — 88305 TISSUE EXAM BY PATHOLOGIST: CPT | Mod: 26

## 2024-10-02 PROCEDURE — 93010 ELECTROCARDIOGRAM REPORT: CPT

## 2024-10-02 PROCEDURE — 99233 SBSQ HOSP IP/OBS HIGH 50: CPT

## 2024-10-02 DEVICE — IUD MIRENA: Type: IMPLANTABLE DEVICE | Status: FUNCTIONAL

## 2024-10-02 RX ORDER — SODIUM CHLORIDE IRRIG SOLUTION 0.9 %
1000 SOLUTION, IRRIGATION IRRIGATION
Refills: 0 | Status: DISCONTINUED | OUTPATIENT
Start: 2024-10-02 | End: 2024-10-02

## 2024-10-02 RX ORDER — ONDANSETRON HCL/PF 4 MG/2 ML
4 VIAL (ML) INJECTION THREE TIMES A DAY
Refills: 0 | Status: DISCONTINUED | OUTPATIENT
Start: 2024-10-02 | End: 2024-10-02

## 2024-10-02 RX ORDER — GLUCAGON INJECTION, SOLUTION 0.5 MG/.1ML
1 INJECTION, SOLUTION SUBCUTANEOUS ONCE
Refills: 0 | Status: DISCONTINUED | OUTPATIENT
Start: 2024-10-02 | End: 2024-10-02

## 2024-10-02 RX ORDER — GLUCAGON INJECTION, SOLUTION 0.5 MG/.1ML
1 INJECTION, SOLUTION SUBCUTANEOUS ONCE
Refills: 0 | Status: DISCONTINUED | OUTPATIENT
Start: 2024-10-02 | End: 2024-10-03

## 2024-10-02 RX ORDER — ONDANSETRON HCL/PF 4 MG/2 ML
4 VIAL (ML) INJECTION ONCE
Refills: 0 | Status: DISCONTINUED | OUTPATIENT
Start: 2024-10-02 | End: 2024-10-02

## 2024-10-02 RX ORDER — ALCOHOL ANTISEPTIC PADS
25 PADS, MEDICATED (EA) TOPICAL ONCE
Refills: 0 | Status: DISCONTINUED | OUTPATIENT
Start: 2024-10-02 | End: 2024-10-03

## 2024-10-02 RX ORDER — INSULIN LISPRO 100/ML
VIAL (ML) SUBCUTANEOUS
Refills: 0 | Status: DISCONTINUED | OUTPATIENT
Start: 2024-10-02 | End: 2024-10-02

## 2024-10-02 RX ORDER — PANTOPRAZOLE SODIUM 40 MG/1
40 TABLET, DELAYED RELEASE ORAL
Refills: 0 | Status: DISCONTINUED | OUTPATIENT
Start: 2024-10-02 | End: 2024-10-03

## 2024-10-02 RX ORDER — ALCOHOL ANTISEPTIC PADS
12.5 PADS, MEDICATED (EA) TOPICAL ONCE
Refills: 0 | Status: DISCONTINUED | OUTPATIENT
Start: 2024-10-02 | End: 2024-10-03

## 2024-10-02 RX ORDER — PANTOPRAZOLE SODIUM 40 MG/1
40 TABLET, DELAYED RELEASE ORAL
Refills: 0 | Status: DISCONTINUED | OUTPATIENT
Start: 2024-10-02 | End: 2024-10-02

## 2024-10-02 RX ORDER — INSULIN LISPRO 100/ML
VIAL (ML) SUBCUTANEOUS EVERY 6 HOURS
Refills: 0 | Status: DISCONTINUED | OUTPATIENT
Start: 2024-10-02 | End: 2024-10-02

## 2024-10-02 RX ORDER — SODIUM CHLORIDE IRRIG SOLUTION 0.9 %
1000 SOLUTION, IRRIGATION IRRIGATION
Refills: 0 | Status: DISCONTINUED | OUTPATIENT
Start: 2024-10-02 | End: 2024-10-03

## 2024-10-02 RX ORDER — ONDANSETRON HCL/PF 4 MG/2 ML
4 VIAL (ML) INJECTION THREE TIMES A DAY
Refills: 0 | Status: DISCONTINUED | OUTPATIENT
Start: 2024-10-02 | End: 2024-10-03

## 2024-10-02 RX ORDER — ALCOHOL ANTISEPTIC PADS
12.5 PADS, MEDICATED (EA) TOPICAL ONCE
Refills: 0 | Status: DISCONTINUED | OUTPATIENT
Start: 2024-10-02 | End: 2024-10-02

## 2024-10-02 RX ORDER — ROSUVASTATIN CALCIUM 20 MG/1
5 TABLET, COATED ORAL AT BEDTIME
Refills: 0 | Status: DISCONTINUED | OUTPATIENT
Start: 2024-10-02 | End: 2024-10-03

## 2024-10-02 RX ORDER — INSULIN LISPRO 100/ML
VIAL (ML) SUBCUTANEOUS AT BEDTIME
Refills: 0 | Status: DISCONTINUED | OUTPATIENT
Start: 2024-10-02 | End: 2024-10-03

## 2024-10-02 RX ORDER — INSULIN LISPRO 100/ML
VIAL (ML) SUBCUTANEOUS
Refills: 0 | Status: DISCONTINUED | OUTPATIENT
Start: 2024-10-02 | End: 2024-10-03

## 2024-10-02 RX ORDER — ALCOHOL ANTISEPTIC PADS
25 PADS, MEDICATED (EA) TOPICAL ONCE
Refills: 0 | Status: DISCONTINUED | OUTPATIENT
Start: 2024-10-02 | End: 2024-10-02

## 2024-10-02 RX ORDER — ROSUVASTATIN CALCIUM 20 MG/1
5 TABLET, COATED ORAL AT BEDTIME
Refills: 0 | Status: DISCONTINUED | OUTPATIENT
Start: 2024-10-02 | End: 2024-10-02

## 2024-10-02 RX ORDER — ALCOHOL ANTISEPTIC PADS
15 PADS, MEDICATED (EA) TOPICAL ONCE
Refills: 0 | Status: DISCONTINUED | OUTPATIENT
Start: 2024-10-02 | End: 2024-10-03

## 2024-10-02 RX ORDER — OXYCODONE HYDROCHLORIDE 30 MG/1
5 TABLET, FILM COATED, EXTENDED RELEASE ORAL ONCE
Refills: 0 | Status: DISCONTINUED | OUTPATIENT
Start: 2024-10-02 | End: 2024-10-02

## 2024-10-02 RX ORDER — HYDROMORPHONE HYDROCHLORIDE 1 MG/ML
0.5 INJECTION, SOLUTION INTRAMUSCULAR; INTRAVENOUS; SUBCUTANEOUS
Refills: 0 | Status: DISCONTINUED | OUTPATIENT
Start: 2024-10-02 | End: 2024-10-02

## 2024-10-02 RX ORDER — ALCOHOL ANTISEPTIC PADS
15 PADS, MEDICATED (EA) TOPICAL ONCE
Refills: 0 | Status: DISCONTINUED | OUTPATIENT
Start: 2024-10-02 | End: 2024-10-02

## 2024-10-02 RX ORDER — INFLUENZA VIRUS VACCINE 15; 15; 15; 15 UG/.5ML; UG/.5ML; UG/.5ML; UG/.5ML
0.5 SUSPENSION INTRAMUSCULAR ONCE
Refills: 0 | Status: DISCONTINUED | OUTPATIENT
Start: 2024-10-02 | End: 2024-10-03

## 2024-10-02 RX ADMIN — Medication 40 MILLIEQUIVALENT(S): at 01:25

## 2024-10-02 RX ADMIN — Medication 2: at 12:19

## 2024-10-02 RX ADMIN — ROSUVASTATIN CALCIUM 5 MILLIGRAM(S): 20 TABLET, COATED ORAL at 22:01

## 2024-10-02 RX ADMIN — Medication 500 MILLIGRAM(S): at 13:44

## 2024-10-02 RX ADMIN — Medication 500 MILLIGRAM(S): at 22:00

## 2024-10-02 RX ADMIN — Medication 500 MILLIGRAM(S): at 05:05

## 2024-10-02 RX ADMIN — Medication 2: at 22:57

## 2024-10-02 RX ADMIN — Medication 75 MILLILITER(S): at 22:00

## 2024-10-02 RX ADMIN — PANTOPRAZOLE SODIUM 40 MILLIGRAM(S): 40 TABLET, DELAYED RELEASE ORAL at 05:05

## 2024-10-02 RX ADMIN — Medication 85 MILLILITER(S): at 08:15

## 2024-10-02 RX ADMIN — Medication 1 TABLET(S): at 05:05

## 2024-10-02 RX ADMIN — Medication 200 MILLIGRAM(S): at 04:41

## 2024-10-02 RX ADMIN — Medication 75 MILLILITER(S): at 21:39

## 2024-10-02 RX ADMIN — Medication 1 TABLET(S): at 17:44

## 2024-10-02 RX ADMIN — Medication 2: at 08:15

## 2024-10-02 NOTE — BRIEF OPERATIVE NOTE - NSICDXBRIEFPOSTOP_GEN_ALL_CORE_FT
POST-OP DIAGNOSIS:  Abnormal uterine bleeding (AUB) 02-Oct-2024 20:21:07  Divya Sargent  Thickened endometrium 02-Oct-2024 20:21:19  Divya Sargent

## 2024-10-02 NOTE — CONSULT NOTE ADULT - CONSULT REASON
abnormal vaginal bleeding with sever anemia
D50.0  Fe def anemia  K90.89  Impaired intestinal absorption  D63.8   Anemia chronic disease  N93.9   Vaginal bleeding  D62      Anemia due to hemorrhage

## 2024-10-02 NOTE — BRIEF OPERATIVE NOTE - NSICDXBRIEFPREOP_GEN_ALL_CORE_FT
PRE-OP DIAGNOSIS:  Abnormal uterine bleeding (AUB) 02-Oct-2024 20:20:25  Divya Sargent  Thickened endometrium 02-Oct-2024 20:20:38  Divya Sargent  Severe anemia 02-Oct-2024 20:20:52  Divya Sargent

## 2024-10-02 NOTE — PROGRESS NOTE ADULT - ASSESSMENT
# severe anemia sec to vaginal bleeding   # acute on chronic anemia requiring iron transfusion     - on admission Hgb of 6.7   - pt received 1 U of PRBCs on 9/28   -  gave 1000 mg IV of Tranexamic acid , no h/o of blood clots   - INR and PTT ordered to f/u   - s/p 1 unit PRBC transfusion with hbg improved to 8.3   - hold BP med given BP borderline likely sec to vaginal bleeding   - s/p blood transfusion after transfusion starting LR @ 85 ml/hr for 10 hrs and to re assessed in am   - GYN consulted to f/u further recommendations , per her outpatient GYN plan for this week appointment and discussing possible hysterectomy.  Pt. scheduled for D&C and IUD placement this evening with Dr. Sargent.    - Pt. without s/s of ACS, decompensated CHF, unstable arrhythmia, or symptomatic aortic stenosis.  Pt. reports that she can achieve METS>4.  No absolute contraindication from medical perspective to proceed with gynecologic procedure.    - hematology consulted Dr. moore  - to avoid NSAIDs    # acute diverticulitis   - pt currently on Augmentin and flagyl since 9/30 and stated significant improvement   - c/w Augmentin and flagyl for now to c/w for total 10 days , pt sometimes just take zofran with antibiotics as they give her some nausea , EKG check  ordered PO Zofran PRN  - patient scheduled already an appointment with her outpatient GI for planned colonoscopy , should be after current acute attack in 4-6 weeks   - pt no leukocytosis or fever , no tender on exam , no nausea or vomiting currently      # hypokalemia   - K 3.3   - replaced with KCL 40 mEQ  - f/u repeat in am and Mg and replace as needed     #HTN/ HLD  -hold ramipril as above mentioned   - c/w home rosuvastatin     # DM  pt at home on Xultophy 50 U at bedtime  but noticed that her BG in am was low in am in 90s also held as her premeal were also in 140s   - for now will hold tonight lantus   - starting moderate SS insulin   - adjust insulin regimen per BG level   - target during admission 140-180       DVT ppx: SCD  full code  carb consistent diet

## 2024-10-02 NOTE — CONSULT NOTE ADULT - ASSESSMENT
[ASSESSMENT and  PLAN]  D50.0  Fe def anemia  K90.89  Impaired intestinal absorption  D63.8   Anemia chronic disease  N93.9   Vaginal bleeding  D62      Anemia due to hemorrhage    55yo F followed by Gyn and hematology outpt admitted with vaginal bleeding  Started IV Iron last week.   Admitted with anemia.     s/p 1U PRBC last Sat    RECOMMENDATIONS    Fe def  Continue Venofer 100mg x3  Follow with Dr Peterson mejia DC    Vag Bleed  Appreciate Gyn eval.   outpt follow with Gyn post D&C     Transfuse PRBC as clinically indicated.   Transfuse PRBC if Hgb <7.0 or if symptomatic.   Follow CBC    DVT Prophylaxis  OOB as rico  HOLD SQ Lovenox or SQ heparin    Discussed plan of care with patient and or family in detail.   Pt/Family expressed understanding of the treatment plan.   Risks, benefits and alternatives discussed in detail.   Opportunity given for questions and discussion.   Questions or concerns all addressed and answered to their satisfaction, and in lay terms.     Discussed with  xxxxxxx.    Thank you for consulting us.     > xxxxxx minutes spent in direct patient care, examining and counseling patient,  reviewing  the notes, lab data/ imaging , discussion with multidisciplinary team.      [ASSESSMENT and  PLAN]  D50.0  Fe def anemia  K90.89  Impaired intestinal absorption  D63.8   Anemia chronic disease  N93.9   Vaginal bleeding  D62      Anemia due to hemorrhage    55yo F followed by Gyn and hematology outpt admitted with vaginal bleeding  Started IV Iron last week.   Admitted with anemia.     s/p 1U PRBC last Sat    RECOMMENDATIONS    Fe def  Continue Venofer 100mg x3  Follow with Dr Winkler post DC    Vag Bleed  Appreciate Gyn eval.   outpt follow with Gyn post D&C     Transfuse PRBC as clinically indicated.   Transfuse PRBC if Hgb <7.0 or if symptomatic.   Follow CBC    DVT Prophylaxis  OOB as rico  HOLD SQ Lovenox or SQ heparin    Discussed plan of care with patient and or family in detail.   Pt/Family expressed understanding of the treatment plan.   Risks, benefits and alternatives discussed in detail.   Opportunity given for questions and discussion.   Questions or concerns all addressed and answered to their satisfaction, and in lay terms.       Thank you for consulting us.     > 55  minutes spent in direct patient care, examining and counseling patient,  reviewing  the notes, lab data/ imaging , discussion with multidisciplinary team.

## 2024-10-02 NOTE — PRE-OP CHECKLIST - BOWEL PREP
This note was copied from a baby's chart.  Breastfeeding assistance provided. Baby latched well with assistance. Active sucking/swallowing noted. Mother instructed on hand expression- colostrum visible. Discharge teaching completed.      [facility] - Mother & Baby  Lactation Note - Baby    SUMMARY     Feeding Method    breastfeeding    Breastfeeding    breastfeeding, bilateral    LATCH Score    Latch: 2-->grasps breast, tongue down, lips flanged, rhythmic sucking  Audible Swallowin-->spontaneous and intermittent (24 hrs old)  Type of Nipple: 2-->everted (after stimulation)  Comfort (Breast/Nipple): 1-->filling, red/small blisters/bruises, mild/mod discomfort  Hold (Positioning): 1-->minimal assist, teach one side, mother does other, staff holds  Score: 8    Breastfeeding Supplementation    Nipple Used For Feeding: standard flow    Nutrition Interventions    Hypoglycemia Management (Infant): breastfeeding promoted  Breastfeeding Support: assisted with latch, assisted with positioning, feeding on demand promoted, feeding session observed, infant moved to breast, hand expression verified, infant latch-on verified, infant stimulated to wakeful state, infant suck/swallow verified, suck stimulated with breast milk, support offered  Oral Nutrition Promotion (Infant): breastfeeding promoted               n/a

## 2024-10-02 NOTE — CARE COORDINATION ASSESSMENT. - NSCAREPROVIDERS_GEN_ALL_CORE_FT
CARE PROVIDERS:  Accepting Physician: Alexander Chau  Administration: Ventura Anglin  Administration: Fátima Bill  Admitting: Alexander Chau  Attending: Harjinder Tinsley  Case Management: Maryan Grossman  Consultant: Divya Sargent  Covering Team: Alexander Chau  ED ACP: Ysabel Vaughan  ED Attending: Benitez Cline  ED Nurse: Soumya Quarles  Emergency Medicine: Ysabel Vaughan  Nurse: Kaylin Little  Nurse: Sierra Gottlieb  Nurse: German Graf  Ordered: Doctor, Unknown  Override: Jassi Gannon  Override: Kaylin Little  PCA/Nursing Assistant: Yisel Hickey  Primary Team: Harjinder Tinsley  Registered Dietitian: Paula Parada  : Aisha Hook

## 2024-10-02 NOTE — CONSULT NOTE ADULT - SUBJECTIVE AND OBJECTIVE BOX
ANUM VELAZQUEZ is a 54y       Patient is a 54y old  Female who presents with a chief complaint of severe anemia sec to vaginal bleeding       HPI:  54 Y.O.F with PMH of iron deficiency anemia , DM , HTN, recurrent vaginal bleeding s/p D & C in 2023 for endometrial polyps.  Patient presented to ED on  for abdominal pain , at that time patient was found on CT to have acute diverticulitis and was discharged home on Augmentin and flagyl. At that time, patient was found to have low Hgb and  she was having the vaginal bleeding. She was transfuse 1 U of PRBCs prior to discharge home.  Patient presented again to ED on 10 /1 as she continue to have the vaginal bleeding. She has been bleeding for 2 weeks. She needed to change a pad about every 2 hrs . She was seeing alot of clots clots and was starting to feel tired. Ms Velazquez denied any SOB or chest pain, no nausea , vomiting or fever   patient stated that her abdominal pain resolved after antibiotics she was given by ED  Pt was admitted for trasfussion amnd GYN evaluation.      MEDICATIONS  (STANDING):  amoxicillin  875 milliGRAM(s)/clavulanate 1 Tablet(s) Oral two times a day  dextrose 5%. 1000 milliLiter(s) (100 mL/Hr) IV Continuous <Continuous>  dextrose 5%. 1000 milliLiter(s) (50 mL/Hr) IV Continuous <Continuous>  dextrose 50% Injectable 12.5 Gram(s) IV Push once  dextrose 50% Injectable 25 Gram(s) IV Push once  dextrose 50% Injectable 25 Gram(s) IV Push once  glucagon  Injectable 1 milliGRAM(s) IntraMuscular once  influenza   Vaccine 0.5 milliLiter(s) IntraMuscular once  insulin lispro (ADMELOG) corrective regimen sliding scale   SubCutaneous every 6 hours  lactated ringers. 1000 milliLiter(s) (85 mL/Hr) IV Continuous <Continuous>  metroNIDAZOLE    Tablet 500 milliGRAM(s) Oral three times a day  pantoprazole    Tablet 40 milliGRAM(s) Oral before breakfast  rosuvastatin 5 milliGRAM(s) Oral at bedtime    MEDICATIONS  (PRN):  dextrose Oral Gel 15 Gram(s) Oral once PRN Blood Glucose LESS THAN 70 milliGRAM(s)/deciliter  ondansetron    Tablet 4 milliGRAM(s) Oral three times a day PRN Nausea      Allergies    IV Contrast (Hives)  shellfish (Hives)  latex (Rash)    Intolerances        PAST MEDICAL & SURGICAL HISTORY:  Basal cell cancer  removed upper back      Gestational diabetes      Diabetes  type 2      Ovarian cyst      Anemia      HTN (hypertension)      LAURO (obstructive sleep apnea)      Abnormal uterine and vaginal bleeding      Obesity      HLD (hyperlipidemia)      S/P  section  twice, ,       S/P anal fissurectomy  10 yrs ago      H/O umbilical hernia repair  2010      S/P lumpectomy, left breast      H/O benign breast biopsy          OB/GYN HISTORY:                                         G    P                                       Last Pap smear:   [id number]                                            FAMILY HISTORY:  Family history of diabetes mellitus (DM)  Mother    FH: HTN (hypertension)  father    FH: melanoma  father        SOCIAL HISTORY:    REVIEW OF SYSTEMS      General:	    Skin/Breast:  	  Ophthalmologic:  	  ENMT:	    Respiratory and Thorax:  	  Cardiovascular:	    Gastrointestinal:	    Genitourinary:	    Musculoskeletal:	    Neurological:	    Psychiatric:	    Hematology/Lymphatics:	    Endocrine:	    Allergic/Immunologic:	    Vital Signs Last 24 Hrs  T(C): 36.7 (02 Oct 2024 11:44), Max: 37.2 (02 Oct 2024 02:17)  T(F): 98.1 (02 Oct 2024 11:44), Max: 98.9 (02 Oct 2024 02:17)  HR: 82 (02 Oct 2024 11:44) (82 - 101)  BP: 122/76 (02 Oct 2024 11:44) (109/53 - 128/77)  BP(mean): --  RR: 18 (02 Oct 2024 11:44) (16 - 18)  SpO2: 99% (02 Oct 2024 11:44) (98% - 100%)    Parameters below as of 02 Oct 2024 11:44  Patient On (Oxygen Delivery Method): room air        Height (cm): 167.6 (10-01-24 @ 19:41)  Weight (kg): 103.4 (10-01-24 @ 19:41)  BMI (kg/m2): 36.8 (10-01-24 @ 19:41)  BSA (m2): 2.11 (10-01-24 @ 19:41)    PHYSICAL EXAM:      Constitutional:    Eyes:    ENMT:    Neck:    Breasts:    Back:    Respiratory:    Cardiovascular:    Gastrointestinal:    Genitourinary:    Rectal:    Extremities:    Vascular:    Neurological:    Skin:    Lymph Nodes:    Musculoskeletal:    Psychiatric:        LABS:                          8.3    9.84  )-----------( 392      ( 02 Oct 2024 06:41 )             27.0     10-02    135  |  103  |  3[L]  ----------------------------<  166[H]  3.9   |  24  |  0.56    Ca    9.0      02 Oct 2024 06:41    TPro  7.1  /  Alb  2.9[L]  /  TBili  0.4  /  DBili  x   /  AST  37  /  ALT  24  /  AlkPhos  87  10-02    PT/INR - ( 02 Oct 2024 00:48 )   PT: 15.0 sec;   INR: 1.27 ratio         PTT - ( 02 Oct 2024 00:48 )  PTT:35.8 sec    Urinalysis Basic - ( 02 Oct 2024 06:41 )    Color: x / Appearance: x / SG: x / pH: x  Gluc: 166 mg/dL / Ketone: x  / Bili: x / Urobili: x   Blood: x / Protein: x / Nitrite: x   Leuk Esterase: x / RBC: x / WBC x   Sq Epi: x / Non Sq Epi: x / Bacteria: x      Blood type         I&O's Detail      RADIOLOGY & ADDITIONAL STUDIES: ANUM VELAZQUEZ is a 54y       Patient is a 54y old  Female who presents with a chief complaint of severe anemia sec to vaginal bleeding       HPI:  54 Y.O.F with PMH of iron deficiency anemia , DM , HTN, recurrent vaginal bleeding s/p D & C in 2023 for endometrial polyps.  Patient presented to ED on  for abdominal pain , at that time patient was found on CT to have acute diverticulitis and was discharged home on Augmentin and flagyl. At that time, patient was found to have low Hgb and  she was having the vaginal bleeding. She was transfuse 1 U of PRBCs prior to discharge home.  Patient presented again to ED on 10 /1 as she continue to have the vaginal bleeding. She has been bleeding for 2 weeks. She needed to change a pad about every 2 hrs . She was seeing alot of clots clots and was starting to feel tired. Ms Velazquez denied any SOB or chest pain, no nausea , vomiting or fever   patient stated that her abdominal pain resolved after antibiotics she was given by ED  Pt was admitted for trasfussion amnd GYN evaluation.      MEDICATIONS  (STANDING):  amoxicillin  875 milliGRAM(s)/clavulanate 1 Tablet(s) Oral two times a day  dextrose 5%. 1000 milliLiter(s) (100 mL/Hr) IV Continuous <Continuous>  dextrose 5%. 1000 milliLiter(s) (50 mL/Hr) IV Continuous <Continuous>  dextrose 50% Injectable 12.5 Gram(s) IV Push once  dextrose 50% Injectable 25 Gram(s) IV Push once  dextrose 50% Injectable 25 Gram(s) IV Push once  glucagon  Injectable 1 milliGRAM(s) IntraMuscular once  influenza   Vaccine 0.5 milliLiter(s) IntraMuscular once  insulin lispro (ADMELOG) corrective regimen sliding scale   SubCutaneous every 6 hours  lactated ringers. 1000 milliLiter(s) (85 mL/Hr) IV Continuous <Continuous>  metroNIDAZOLE    Tablet 500 milliGRAM(s) Oral three times a day  pantoprazole    Tablet 40 milliGRAM(s) Oral before breakfast  rosuvastatin 5 milliGRAM(s) Oral at bedtime    MEDICATIONS  (PRN):  dextrose Oral Gel 15 Gram(s) Oral once PRN Blood Glucose LESS THAN 70 milliGRAM(s)/deciliter  ondansetron    Tablet 4 milliGRAM(s) Oral three times a day PRN Nausea      Allergies    IV Contrast (Hives)  shellfish (Hives)  latex (Rash)    Intolerances        PAST MEDICAL & SURGICAL HISTORY:  Basal cell cancer  removed upper back      Gestational diabetes      Diabetes  type 2      Ovarian cyst      Anemia      HTN (hypertension)      LAURO (obstructive sleep apnea)      Abnormal uterine and vaginal bleeding      Obesity      HLD (hyperlipidemia)      S/P  section  twice, ,       S/P anal fissurectomy  10 yrs ago      H/O umbilical hernia repair        S/P lumpectomy, left breast      H/O benign breast biopsy          OB/GYN HISTORY:                                                                               Last Pap smear:                                             FAMILY HISTORY:  Family history of diabetes mellitus (DM)  Mother    FH: HTN (hypertension)  father    FH: melanoma  father        SOCIAL HISTORY:    REVIEW OF SYSTEMS:  as per HPI      Vital Signs Last 24 Hrs  T(C): 36.7 (02 Oct 2024 11:44), Max: 37.2 (02 Oct 2024 02:17)  T(F): 98.1 (02 Oct 2024 11:44), Max: 98.9 (02 Oct 2024 02:17)  HR: 82 (02 Oct 2024 11:44) (82 - 101)  BP: 122/76 (02 Oct 2024 11:44) (109/53 - 128/77)  BP(mean): --  RR: 18 (02 Oct 2024 11:44) (16 - 18)  SpO2: 99% (02 Oct 2024 11:44) (98% - 100%)    Parameters below as of 02 Oct 2024 11:44  Patient On (Oxygen Delivery Method): room air        Height (cm): 167.6 (10-01-24 @ 19:41)  Weight (kg): 103.4 (10-01-24 @ 19:41)  BMI (kg/m2): 36.8 (10-01-24 @ 19:41)  BSA (m2): 2.11 (10-01-24 @ 19:41)    PHYSICAL EXAM:      Constitutional:    HEENT: wnl    Respiratory: clear    Cardiovascular: RR    Gastrointestinal: abdomin, soft, N/T    Genitourinary: WNL    Extremities: -CCE    Neurological: grossly intact    Skin: smooth, warm and dry    Musculoskeletal: wnl    Psychiatric:        LABS:                          8.3    9.84  )-----------( 392      ( 02 Oct 2024 06:41 )             27.0     10    135  |  103  |  3[L]  ----------------------------<  166[H]  3.9   |  24  |  0.56    Ca    9.0      02 Oct 2024 06:41    TPro  7.1  /  Alb  2.9[L]  /  TBili  0.4  /  DBili  x   /  AST  37  /  ALT  24  /  AlkPhos  87  10-02    PT/INR - ( 02 Oct 2024 00:48 )   PT: 15.0 sec;   INR: 1.27 ratio         PTT - ( 02 Oct 2024 00:48 )  PTT:35.8 sec    Urinalysis Basic - ( 02 Oct 2024 06:41 )    Color: x / Appearance: x / SG: x / pH: x  Gluc: 166 mg/dL / Ketone: x  / Bili: x / Urobili: x   Blood: x / Protein: x / Nitrite: x   Leuk Esterase: x / RBC: x / WBC x   Sq Epi: x / Non Sq Epi: x / Bacteria: x      Blood type    O+         RADIOLOGY & ADDITIONAL STUDIES:    CT:  < from: CT Abdomen and Pelvis w/ Oral Cont (24 @ 20:48) >  REPRODUCTIVE ORGANS: Postsurgical changes at the uterus. Prominent   endometrium.    < end of copied text >

## 2024-10-02 NOTE — CONSULT NOTE ADULT - ASSESSMENT
pt with abnormal vaginal bleeding with associated anemia requiring Blood transfusion.  h/o endometrial polyps in Nov/2023  Ct noted prominent endometrium  Pt to Have Hysteroscopy D+C with placement of Mirena IUD    procedure was reviewed with her in detail. we also discussed endometrial ablation, She was told that she could follow up with her GYN, Dr Gregory and have either of this procedure with her, Hysterectomy was also an option but is more invasive. She has h/o C/S and is conserned about adhesions.  She has decide to have D+C with mirena iud Placement

## 2024-10-02 NOTE — CARE COORDINATION ASSESSMENT. - OTHER PERTINENT DISCHARGE PLANNING INFORMATION:
Pt resides in private home with spouse. Pt fully independent at home, no needs noted at this time. No hx of Home care svs. Pt was in ER Saturday due to diverticulitis. CM will follow. SW to remain available for any needs.

## 2024-10-02 NOTE — SBIRT NOTE ADULT - NSSBIRTALCNOACTINTDET_GEN_A_CORE
Pt reports social ETOH use. She states "a few drinks" occasionally with friends. Pt denies concerns at this time. SW to follow and remain available for any needs.

## 2024-10-02 NOTE — PRE-OP CHECKLIST - PATIENT PROBLEMS/NEEDS
Detail Level: Zone Render In Strict Bullet Format?: No Initiate Treatment: 40% SA pads Patient expressed no known problems or needs

## 2024-10-02 NOTE — CONSULT NOTE ADULT - SUBJECTIVE AND OBJECTIVE BOX
INCOMPLETE NOTE.  Documentation in Progress  PT SEEN AND EVALUATED.   FULL/ADDITIONAL RECOMMENDATIONS TO FOLLOW   ***************************************************************    Patient is a 54y old  Female who presents with a chief complaint of severe anemia sec to vaginal bleeding (02 Oct 2024 14:28)      HPI:  54 Y.O.F with PMH of iron deficiency anemia , DM , HTN, recurrent vaginal bleeding s/p D & C in 2023   patient presented to ED on  for abdominal pain , at that time patient was found on CT to have acute diverticulitis and was discharged home on Augmentin and flagyl , also during same visit patient was found to have low Hgb of 7.1 and told ED attending that she is currently having the vaginal bleeding   so in ED transfuse 1 U of PRBCs prior to discharge home   patient presented again to ED tonight on 10 /1 as she continue to have the vaginal bleeding currently for 2 weeks , need to change a pad about every 2 hrs , seen clots and was starting to feel tired   denied any SOB or chest pain   no nausea , vomiting or fever   patient stated that her abdominal pain resolved after antibiotics she was given by ED    GYN Dr. Sullivan per ED and stated will come and evaluate pt in am  (01 Oct 2024 23:56)      PAST MEDICAL & SURGICAL HISTORY:  Basal cell cancer  removed upper back      Gestational diabetes      Diabetes  type 2      Ovarian cyst      Anemia      HTN (hypertension)      LAURO (obstructive sleep apnea)      Abnormal uterine and vaginal bleeding      Obesity      HLD (hyperlipidemia)      S/P  section  twice, ,       S/P anal fissurectomy  10 yrs ago      H/O umbilical hernia repair        S/P lumpectomy, left breast      H/O benign breast biopsy         HEALTH ISSUES - PROBLEM Dx:        Abnormal uterine and vaginal bleeding [N93.9]    No Past Medical History [NPM@999]    Basal cell cancer [173.91]    Gestational diabetes [648.80]    Diabetes [E11.9]    Ovarian cyst [N83.209]    Anemia [D64.9]    HTN (hypertension) [I10]    LAURO (obstructive sleep apnea) [G47.33]    Abnormal uterine and vaginal bleeding [N93.9]    Obesity [E66.9]    HLD (hyperlipidemia) [E78.5]    S/P  section [669.70]    S/P anal fissurectomy [V45.89]    H/O umbilical hernia repair [Z98.890]    S/P lumpectomy, left breast [Z98.890]    H/O benign breast biopsy [Z98.890]      FAMILY HISTORY:  Family history of diabetes mellitus (DM)  Mother    FH: HTN (hypertension)  father    FH: melanoma  father        [SOCIAL HISTORY: ]     smoking:  none currently     EtOH:  none currently     illicit drugs:  none currently     occupation:  Retired     marital status:       Other:       [ALLERGIES/INTOLERANCES:]  Allergies    IV Contrast (Hives)  shellfish (Hives)  latex (Rash)    Intolerances        [MEDICATIONS]  MEDICATIONS  (STANDING):  amoxicillin  875 milliGRAM(s)/clavulanate 1 Tablet(s) Oral two times a day  dextrose 5%. 1000 milliLiter(s) (100 mL/Hr) IV Continuous <Continuous>  dextrose 5%. 1000 milliLiter(s) (50 mL/Hr) IV Continuous <Continuous>  dextrose 50% Injectable 12.5 Gram(s) IV Push once  dextrose 50% Injectable 25 Gram(s) IV Push once  dextrose 50% Injectable 25 Gram(s) IV Push once  glucagon  Injectable 1 milliGRAM(s) IntraMuscular once  influenza   Vaccine 0.5 milliLiter(s) IntraMuscular once  insulin lispro (ADMELOG) corrective regimen sliding scale   SubCutaneous every 6 hours  lactated ringers. 1000 milliLiter(s) (85 mL/Hr) IV Continuous <Continuous>  metroNIDAZOLE    Tablet 500 milliGRAM(s) Oral three times a day  pantoprazole    Tablet 40 milliGRAM(s) Oral before breakfast  rosuvastatin 5 milliGRAM(s) Oral at bedtime    MEDICATIONS  (PRN):  dextrose Oral Gel 15 Gram(s) Oral once PRN Blood Glucose LESS THAN 70 milliGRAM(s)/deciliter  ondansetron    Tablet 4 milliGRAM(s) Oral three times a day PRN Nausea      [REVIEW OF SYSTEMS: ]  CONSTITUTIONAL: normal, no fever, no shakes, no chills   EYES: No eye pain, no visual disturbances, no discharge  ENMT:  no discharge  NECK: No pain, no stiffness  BREASTS: No pain, no masses, no nipple discharge  RESPIRATORY: No cough, no wheezing, no chills, no hemoptysis; No shortness of breath  CARDIOVASCULAR: No chest pain, no palpitations, no dizziness, no leg swelling  GASTROINTESTINAL: No abdominal, no epigastric pain. No nausea, no vomiting, no hematemesis; No diarrhea , no constipation. No melena, no hematochezia.  GENITOURINARY: No dysuria, no frequency, no hematuria, no incontinence  NEUROLOGICAL: No headaches, no memory loss, no loss of strength, no numbness, no tremors  SKIN: No itching, no burning, no rashes, no lesions   LYMPH NODES: No enlarged glands  ENDOCRINE: No heat or cold intolerance; No hair loss  MUSCULOSKELETAL: No joint pain or swelling; No muscle, no back, no extremity pain  PSYCHIATRIC: No depression, no anxiety, no mood swings, no difficulty sleeping  HEME/LYMPH: No easy bruising, no bleeding gums    [VITALS SIGNS 24hrs]  Vital Signs Last 24 Hrs  T(C): 36.7 (02 Oct 2024 11:44), Max: 37.2 (02 Oct 2024 02:17)  T(F): 98.1 (02 Oct 2024 11:44), Max: 98.9 (02 Oct 2024 02:17)  HR: 82 (02 Oct 2024 11:44) (82 - 101)  BP: 122/76 (02 Oct 2024 11:44) (109/53 - 128/77)  BP(mean): --  RR: 18 (02 Oct 2024 11:44) (16 - 18)  SpO2: 99% (02 Oct 2024 11:44) (98% - 100%)    Parameters below as of 02 Oct 2024 11:44  Patient On (Oxygen Delivery Method): room air      Daily Height in cm: 167.64 (01 Oct 2024 19:41)    Daily     I&O's Summary      [PHYSICAL EXAM]  GEN:   HEENT: normocephalic and atraumatic. EOMI. PERRL.    NECK: Supple.  No lymphadenopathy   LUNGS: Clear to auscultation.  HEART: S1S2 Regular rate and rhythm, no MRG  ABDOMEN: Soft, nontender, and nondistended.  Positive bowel sounds.    : No CVA tenderness  EXTREMITIES: Without edema.  NEUROLOGIC: grossly intact.  PSYCHIATRIC: Appropriate affect .  SKIN: No rash     [LABS: ]                        8.3    9.84  )-----------( 392      ( 02 Oct 2024 06:41 )             27.0     CBC Full  -  ( 02 Oct 2024 06:41 )  WBC Count : 9.84 K/uL  RBC Count : 3.32 M/uL  Hemoglobin : 8.3 g/dL  Hematocrit : 27.0 %  Platelet Count - Automated : 392 K/uL  Mean Cell Volume : 81.3 fl  Mean Cell Hemoglobin : 25.0 pg  Mean Cell Hemoglobin Concentration : 30.7 gm/dL  Auto Neutrophil # : x  Auto Lymphocyte # : x  Auto Monocyte # : x  Auto Eosinophil # : x  Auto Basophil # : x  Auto Neutrophil % : x  Auto Lymphocyte % : x  Auto Monocyte % : x  Auto Eosinophil % : x  Auto Basophil % : x    10-02    135  |  103  |  3[L]  ----------------------------<  166[H]  3.9   |  24  |  0.56    Ca    9.0      02 Oct 2024 06:41    TPro  7.1  /  Alb  2.9[L]  /  TBili  0.4  /  DBili  x   /  AST  37  /  ALT  24  /  AlkPhos  87  10-02    PT/INR - ( 02 Oct 2024 00:48 )   PT: 15.0 sec;   INR: 1.27 ratio         PTT - ( 02 Oct 2024 00:48 )  PTT:35.8 sec  LIVER FUNCTIONS - ( 02 Oct 2024 06:41 )  Alb: 2.9 g/dL / Pro: 7.1 g/dL / ALK PHOS: 87 U/L / ALT: 24 U/L / AST: 37 U/L / GGT: x               Urinalysis Basic - ( 02 Oct 2024 06:41 )    Color: x / Appearance: x / SG: x / pH: x  Gluc: 166 mg/dL / Ketone: x  / Bili: x / Urobili: x   Blood: x / Protein: x / Nitrite: x   Leuk Esterase: x / RBC: x / WBC x   Sq Epi: x / Non Sq Epi: x / Bacteria: x          CBC TREND (5 Days)  WBC Count: 9.84 K/uL (10-02 @ 06:41)  WBC Count: 8.21 K/uL (10-01 @ 21:15)    Hemoglobin: 8.3 g/dL (10-02 @ 06:41)  Hemoglobin: 6.7 g/dL (10-01 @ 21:15)    Hematocrit: 27.0 % (10-02 @ 06:41)  Hematocrit: 23.4 % (10-01 @ 21:15)    Platelet Count - Automated: 392 K/uL (10-02 @ 06:41)  Platelet Count - Automated: 373 K/uL (10-01 @ 21:15)                                            [MICROBIOLOGY /  VIROLOGY:]          [PATHOLOGY]       [RADIOLOGY & ADDITIONAL STUDIES:]        Patient is a 54y old  Female who presents with a chief complaint of severe anemia sec to vaginal bleeding (02 Oct 2024 14:28)    HPI:  54 Y.O.F with PMH of iron deficiency anemia , DM , HTN, recurrent vaginal bleeding s/p D & C in 2023   patient presented to ED on  for abdominal pain , at that time patient was found on CT to have acute diverticulitis and was discharged home on Augmentin and flagyl , also during same visit patient was found to have low Hgb of 7.1 and told ED attending that she is currently having the vaginal bleeding   so in ED transfuse 1 U of PRBCs prior to discharge home   patient presented again to ED tonight on 10 /1 as she continue to have the vaginal bleeding currently for 2 weeks , need to change a pad about every 2 hrs , seen clots and was starting to feel tired   denied any SOB or chest pain   no nausea , vomiting or fever   patient stated that her abdominal pain resolved after antibiotics she was given by ED    GYN Dr. Sullivan per ED and stated will come and evaluate pt in am  (01 Oct 2024 23:56)      PAST MEDICAL & SURGICAL HISTORY:  Basal cell cancer     removed upper back  Gestational diabetes  Diabetes type 2  Ovarian cyst  Anemia  HTN (hypertension)  LAURO (obstructive sleep apnea)  Abnormal uterine and vaginal bleeding  Obesity  HLD (hyperlipidemia)  S/P  section twice, ,   S/P anal fissurectomy 10 yrs ago  H/O umbilical hernia repair   S/P lumpectomy, left breast  H/O benign breast biopsy      HEALTH ISSUES - PROBLEM Dx:  Abnormal uterine and vaginal bleeding [N93.9]  Basal cell cancer [173.91]  Gestational diabetes [648.80]  Diabetes [E11.9]  Ovarian cyst [N83.209]  Anemia [D64.9]  HTN (hypertension) [I10]  LAURO (obstructive sleep apnea) [G47.33]  Abnormal uterine and vaginal bleeding [N93.9]  Obesity [E66.9]  HLD (hyperlipidemia) [E78.5]  S/P  section [669.70]  S/P anal fissurectomy [V45.89]  H/O umbilical hernia repair [Z98.890]  S/P lumpectomy, left breast [Z98.890]  H/O benign breast biopsy [Z98.890]      FAMILY HISTORY:  Family history of diabetes mellitus (DM) Mother  FH: HTN (hypertension) father  FH: melanoma father  1 of 8 children      [SOCIAL HISTORY: ]     smoking:  none currently     EtOH:  none currently     illicit drugs:  none currently     occupation:  Retired     marital status:       Other:       [ALLERGIES/INTOLERANCES:]  Allergies     IV Contrast (Hives)     shellfish (Hives)     latex (Rash)  Intolerances      [MEDICATIONS]  MEDICATIONS  (STANDING):  amoxicillin  875 milliGRAM(s)/clavulanate 1 Tablet(s) Oral two times a day  dextrose 5%. 1000 milliLiter(s) (100 mL/Hr) IV Continuous <Continuous>  dextrose 5%. 1000 milliLiter(s) (50 mL/Hr) IV Continuous <Continuous>  dextrose 50% Injectable 12.5 Gram(s) IV Push once  dextrose 50% Injectable 25 Gram(s) IV Push once  dextrose 50% Injectable 25 Gram(s) IV Push once  glucagon  Injectable 1 milliGRAM(s) IntraMuscular once  influenza   Vaccine 0.5 milliLiter(s) IntraMuscular once  insulin lispro (ADMELOG) corrective regimen sliding scale   SubCutaneous every 6 hours  lactated ringers. 1000 milliLiter(s) (85 mL/Hr) IV Continuous <Continuous>  metroNIDAZOLE    Tablet 500 milliGRAM(s) Oral three times a day  pantoprazole    Tablet 40 milliGRAM(s) Oral before breakfast  rosuvastatin 5 milliGRAM(s) Oral at bedtime      MEDICATIONS  (PRN):  dextrose Oral Gel 15 Gram(s) Oral once PRN Blood Glucose LESS THAN 70 milliGRAM(s)/deciliter  ondansetron    Tablet 4 milliGRAM(s) Oral three times a day PRN Nausea      [REVIEW OF SYSTEMS: ]  CONSTITUTIONAL: normal, no fever, no shakes, no chills   EYES: No eye pain, no visual disturbances, no discharge  ENMT:  no discharge  NECK: No pain, no stiffness  BREASTS: No pain, no masses, no nipple discharge  RESPIRATORY: No cough, no wheezing, no chills, no hemoptysis; No shortness of breath  CARDIOVASCULAR: No chest pain, no palpitations, no dizziness, no leg swelling  GASTROINTESTINAL: No abdominal, no epigastric pain. No nausea, no vomiting, no hematemesis; No diarrhea , no constipation. No melena, no hematochezia.  GENITOURINARY: No dysuria, no frequency, no hematuria, no incontinence  NEUROLOGICAL: No headaches, no memory loss, no loss of strength, no numbness, no tremors  SKIN: No itching, no burning, no rashes, no lesions   LYMPH NODES: No enlarged glands  ENDOCRINE: No heat or cold intolerance; No hair loss  MUSCULOSKELETAL: No joint pain or swelling; No muscle, no back, no extremity pain  PSYCHIATRIC: No depression, no anxiety, no mood swings, no difficulty sleeping  HEME/LYMPH: No easy bruising, no bleeding gums      [VITALS SIGNS 24hrs]  Vital Signs Last 24 Hrs  T(C): 36.7 (02 Oct 2024 11:44), Max: 37.2 (02 Oct 2024 02:17)  T(F): 98.1 (02 Oct 2024 11:44), Max: 98.9 (02 Oct 2024 02:17)  HR: 82 (02 Oct 2024 11:44) (82 - 101)  BP: 122/76 (02 Oct 2024 11:44) (109/53 - 128/77)  BP(mean): --  RR: 18 (02 Oct 2024 11:44) (16 - 18)  SpO2: 99% (02 Oct 2024 11:44) (98% - 100%)    Parameters below as of 02 Oct 2024 11:44  Patient On (Oxygen Delivery Method): room air    Daily Height in cm: 167.64 (01 Oct 2024 19:41)    Daily     I&O's Summary      [PHYSICAL EXAM]  GEN: WN WD NAD  HEENT: normocephalic and atraumatic. EOMI. PERRL.    NECK: Supple.  No lymphadenopathy   LUNGS: Clear to auscultation.  HEART: S1S2 Regular rate and rhythm, no MRG  ABDOMEN: Soft, nontender, and nondistended.  Positive bowel sounds.    : No CVA tenderness  EXTREMITIES: Without edema.  NEUROLOGIC: grossly intact.  PSYCHIATRIC: Appropriate affect .  SKIN: No rash       [LABS: ]             8.3    9.84  )-----------( 392      ( 02 Oct 2024 06:41 )             27.0   CBC Full  -  ( 02 Oct 2024 06:41 )  WBC Count : 9.84 K/uL  RBC Count : 3.32 M/uL  Hemoglobin : 8.3 g/dL  Hematocrit : 27.0 %  Platelet Count - Automated : 392 K/uL  Mean Cell Volume : 81.3 fl  Mean Cell Hemoglobin : 25.0 pg  Mean Cell Hemoglobin Concentration : 30.7 gm/dL  Auto Neutrophil # : x  Auto Lymphocyte # : x  Auto Monocyte # : x  Auto Eosinophil # : x  Auto Basophil # : x  Auto Neutrophil % : x  Auto Lymphocyte % : x  Auto Monocyte % : x  Auto Eosinophil % : x  Auto Basophil % : x    10-02  135  |  103  |  3[L]  ----------------------------<  166[H]  3.9   |  24  |  0.56  Ca    9.0      02 Oct 2024 06:41  TPro  7.1  /  Alb  2.9[L]  /  TBili  0.4  /  DBili  x   /  AST  37  /  ALT  24  /  AlkPhos  87  10-02  PT/INR - ( 02 Oct 2024 00:48 )   PT: 15.0 sec;   INR: 1.27 ratio    PTT - ( 02 Oct 2024 00:48 )  PTT:35.8 sec  LIVER FUNCTIONS - ( 02 Oct 2024 06:41 )  Alb: 2.9 g/dL / Pro: 7.1 g/dL / ALK PHOS: 87 U/L / ALT: 24 U/L / AST: 37 U/L / GGT: x           Urinalysis Basic - ( 02 Oct 2024 06:41 )  Color: x / Appearance: x / SG: x / pH: x  Gluc: 166 mg/dL / Ketone: x  / Bili: x / Urobili: x   Blood: x / Protein: x / Nitrite: x   Leuk Esterase: x / RBC: x / WBC x   Sq Epi: x / Non Sq Epi: x / Bacteria: x    CBC TREND (5 Days)  WBC Count: 9.84 K/uL (10-02 @ 06:41)  WBC Count: 8.21 K/uL (10-01 @ 21:15)  Hemoglobin: 8.3 g/dL (10-02 @ 06:41)  Hemoglobin: 6.7 g/dL (10-01 @ 21:15)  Hematocrit: 27.0 % (10-02 @ 06:41)  Hematocrit: 23.4 % (10-01 @ 21:15)  Platelet Count - Automated: 392 K/uL (10-02 @ 06:41)  Platelet Count - Automated: 373 K/uL (10-01 @ 21:15)      [MICROBIOLOGY /  VIROLOGY:]      [PATHOLOGY]       [RADIOLOGY & ADDITIONAL STUDIES:]       ACC: [id number] EXAM:  CT ABDOMEN AND PELVIS OC   ORDERED BY: JUAN JOSE VALENTINE   PROCEDURE DATE:  2024    INTERPRETATION:  CLINICAL INDICATION: abd pain ro diverticulitis ro renal colic  PROCEDURE:  Helical axial images were obtained from the domes of the diaphragm through the pubic symphysis without intravenous contrast. Oral contrast was administered. Coronal and sagittal reformats were also obtained.    CONTRAST/COMPLICATIONS:  IV Contrast: None  Oral Contrast: None  Complications: None    COMPARISON: 6/3/2022. 10/3/2023.    FINDINGS: Evaluation of the abdominal/pelvic organs and vasculature is limited without intravenous contrast.    LOWER CHEST: Atelectasis.  LIVER: Diffuse low-attenuation, likely fatty infiltration. Fatty sparing   near the gallbladder fossa.  BILE DUCTS/GALLBLADDER: No intrahepatic or extrahepatic biliary   dilatation. No obvious radiopaque gallstone.  PANCREAS: Unremarkable.  SPLEEN: Unremarkable.  ADRENALS: Unremarkable.  KIDNEYS/URETERS: No hydronephrosis, hydroureter or significant   perinephric stranding. No radiopaque urinary tract stone.  BLADDER: Partially distended.  REPRODUCTIVE ORGANS: Postsurgical changes at the uterus. Prominent endometrium.  BOWEL: No bowel obstruction.Unremarkable appendix. Colon diverticulosis. Sigmoid colon wall thickening with peridiverticular inflammatory change.  PERITONEUM: No organized fluid collection or free air. Trace free fluid at the pelvis.  VESSELS: Atherosclerosis. Normal caliber of the abdominal aorta.  RETROPERITONEUM/LYMPH NODE: Subcentimeter lymph nodes.  ABDOMINAL WALL/SOFT TISSUES: Postsurgical changes along the abdominal/pelvic wall soft tissue.  BONES: Degenerative changes of the spine. Stable grade 1 anterolisthesis of L4 on L5 and minimal retrolisthesis of L3 on L4.    IMPRESSION:  Sigmoid colon diverticulitis. Small pelvic free fluid. No bowel obstruction, organized fluid collection or intraperitoneal free air.    Correlation with colonoscopy would be helpful to exclude neoplasm, following completion of treatment.    Prominent endometrium. Please correlate with patient's menstrual status. Follow-up would be helpful if patient is postmenopausal as underlying lesion is not excluded.  Additional findings as described.  --- End of Report ---  HARISH HERNANDEZ MD; Attending Radiologist  This document has been electronically signed. Sep 28 2024  9:52PM

## 2024-10-02 NOTE — BRIEF OPERATIVE NOTE - OPERATION/FINDINGS
Acutely Anteverted uterus~10 weeks size, no adnexal masses  uterus sounding to 13 cm  cervix short and open  moderate amount of blood in the vagina  endometrium was hemorrhagic that precluded through evaluation of the endometrial cavity  the tissue visualized appeared hyperplastic  MIrena IUD placed without complication lot [id number]  exp. oct/2026

## 2024-10-02 NOTE — BRIEF OPERATIVE NOTE - NSICDXBRIEFPROCEDURE_GEN_ALL_CORE_FT
PROCEDURES:  Hysteroscopy, with dilation and curettage 02-Oct-2024 21:07:03 usiging suction Divya Sargent  Insertion of Mirena intrauterine device (IUD) 02-Oct-2024 21:07:57  Divya Sargent

## 2024-10-02 NOTE — CARE COORDINATION ASSESSMENT. - NSPASTMEDSURGHISTORY_GEN_ALL_CORE_FT
PAST MEDICAL & SURGICAL HISTORY:  Gestational diabetes      Basal cell cancer  removed upper back      S/P anal fissurectomy  10 yrs ago      S/P  section  twice, ,       Diabetes  type 2      Anemia      Ovarian cyst      H/O umbilical hernia repair        HLD (hyperlipidemia)      Obesity      Abnormal uterine and vaginal bleeding      LAURO (obstructive sleep apnea)      HTN (hypertension)      H/O benign breast biopsy      S/P lumpectomy, left breast

## 2024-10-02 NOTE — CARE COORDINATION ASSESSMENT. - REASON FOR CONSULT
SBIRT completed, social ETOH use, no concerns noted. SW to follow and remain available for any needs./substance abuse issues

## 2024-10-03 ENCOUNTER — TRANSCRIPTION ENCOUNTER (OUTPATIENT)
Age: 54
End: 2024-10-03

## 2024-10-03 VITALS
DIASTOLIC BLOOD PRESSURE: 80 MMHG | HEART RATE: 77 BPM | OXYGEN SATURATION: 98 % | SYSTOLIC BLOOD PRESSURE: 121 MMHG | TEMPERATURE: 98 F | RESPIRATION RATE: 20 BRPM

## 2024-10-03 LAB
A1C WITH ESTIMATED AVERAGE GLUCOSE RESULT: 6.6 % — HIGH (ref 4–5.6)
A1C WITH ESTIMATED AVERAGE GLUCOSE RESULT: 6.7 % — HIGH (ref 4–5.6)
ANION GAP SERPL CALC-SCNC: 9 MMOL/L — SIGNIFICANT CHANGE UP (ref 5–17)
BUN SERPL-MCNC: 6 MG/DL — LOW (ref 7–23)
CALCIUM SERPL-MCNC: 9 MG/DL — SIGNIFICANT CHANGE UP (ref 8.5–10.1)
CHLORIDE SERPL-SCNC: 103 MMOL/L — SIGNIFICANT CHANGE UP (ref 96–108)
CO2 SERPL-SCNC: 22 MMOL/L — SIGNIFICANT CHANGE UP (ref 22–31)
CREAT SERPL-MCNC: 0.58 MG/DL — SIGNIFICANT CHANGE UP (ref 0.5–1.3)
EGFR: 107 ML/MIN/1.73M2 — SIGNIFICANT CHANGE UP
ESTIMATED AVERAGE GLUCOSE: 143 MG/DL — HIGH (ref 68–114)
ESTIMATED AVERAGE GLUCOSE: 146 MG/DL — HIGH (ref 68–114)
GLUCOSE SERPL-MCNC: 266 MG/DL — HIGH (ref 70–99)
HCT VFR BLD CALC: 29.7 % — LOW (ref 34.5–45)
HGB BLD-MCNC: 8.7 G/DL — LOW (ref 11.5–15.5)
MAGNESIUM SERPL-MCNC: 2.5 MG/DL — SIGNIFICANT CHANGE UP (ref 1.6–2.6)
MCHC RBC-ENTMCNC: 24.2 PG — LOW (ref 27–34)
MCHC RBC-ENTMCNC: 29.3 GM/DL — LOW (ref 32–36)
MCV RBC AUTO: 82.7 FL — SIGNIFICANT CHANGE UP (ref 80–100)
NRBC # BLD: 0 /100 WBCS — SIGNIFICANT CHANGE UP (ref 0–0)
PLATELET # BLD AUTO: 418 K/UL — HIGH (ref 150–400)
POTASSIUM SERPL-MCNC: 4.3 MMOL/L — SIGNIFICANT CHANGE UP (ref 3.5–5.3)
POTASSIUM SERPL-SCNC: 4.3 MMOL/L — SIGNIFICANT CHANGE UP (ref 3.5–5.3)
RBC # BLD: 3.59 M/UL — LOW (ref 3.8–5.2)
RBC # FLD: 17.5 % — HIGH (ref 10.3–14.5)
SODIUM SERPL-SCNC: 134 MMOL/L — LOW (ref 135–145)
WBC # BLD: 8.46 K/UL — SIGNIFICANT CHANGE UP (ref 3.8–10.5)
WBC # FLD AUTO: 8.46 K/UL — SIGNIFICANT CHANGE UP (ref 3.8–10.5)

## 2024-10-03 PROCEDURE — 96374 THER/PROPH/DIAG INJ IV PUSH: CPT

## 2024-10-03 PROCEDURE — 86850 RBC ANTIBODY SCREEN: CPT

## 2024-10-03 PROCEDURE — 86923 COMPATIBILITY TEST ELECTRIC: CPT

## 2024-10-03 PROCEDURE — 80048 BASIC METABOLIC PNL TOTAL CA: CPT

## 2024-10-03 PROCEDURE — 85025 COMPLETE CBC W/AUTO DIFF WBC: CPT

## 2024-10-03 PROCEDURE — 99285 EMERGENCY DEPT VISIT HI MDM: CPT | Mod: 25

## 2024-10-03 PROCEDURE — 85610 PROTHROMBIN TIME: CPT

## 2024-10-03 PROCEDURE — 83735 ASSAY OF MAGNESIUM: CPT

## 2024-10-03 PROCEDURE — 85730 THROMBOPLASTIN TIME PARTIAL: CPT

## 2024-10-03 PROCEDURE — C9399: CPT

## 2024-10-03 PROCEDURE — 36415 COLL VENOUS BLD VENIPUNCTURE: CPT

## 2024-10-03 PROCEDURE — 88305 TISSUE EXAM BY PATHOLOGIST: CPT

## 2024-10-03 PROCEDURE — 36430 TRANSFUSION BLD/BLD COMPNT: CPT

## 2024-10-03 PROCEDURE — 81025 URINE PREGNANCY TEST: CPT

## 2024-10-03 PROCEDURE — 93005 ELECTROCARDIOGRAM TRACING: CPT

## 2024-10-03 PROCEDURE — 86901 BLOOD TYPING SEROLOGIC RH(D): CPT

## 2024-10-03 PROCEDURE — P9016: CPT

## 2024-10-03 PROCEDURE — 99239 HOSP IP/OBS DSCHRG MGMT >30: CPT

## 2024-10-03 PROCEDURE — 80053 COMPREHEN METABOLIC PANEL: CPT

## 2024-10-03 PROCEDURE — 85027 COMPLETE CBC AUTOMATED: CPT

## 2024-10-03 PROCEDURE — 83036 HEMOGLOBIN GLYCOSYLATED A1C: CPT

## 2024-10-03 PROCEDURE — 86900 BLOOD TYPING SEROLOGIC ABO: CPT

## 2024-10-03 PROCEDURE — 82962 GLUCOSE BLOOD TEST: CPT

## 2024-10-03 RX ORDER — IRON SUCROSE 20 MG/ML
200 INJECTION, SOLUTION INTRAVENOUS ONCE
Refills: 0 | Status: COMPLETED | OUTPATIENT
Start: 2024-10-03 | End: 2024-10-03

## 2024-10-03 RX ORDER — FERROUS SULFATE 325(65) MG
1 TABLET ORAL
Qty: 30 | Refills: 0
Start: 2024-10-03

## 2024-10-03 RX ADMIN — Medication 220 MILLIGRAM(S): at 07:55

## 2024-10-03 RX ADMIN — IRON SUCROSE 110 MILLIGRAM(S): 20 INJECTION, SOLUTION INTRAVENOUS at 11:39

## 2024-10-03 RX ADMIN — Medication 500 MILLIGRAM(S): at 14:01

## 2024-10-03 RX ADMIN — Medication 500 MILLIGRAM(S): at 06:10

## 2024-10-03 RX ADMIN — PANTOPRAZOLE SODIUM 40 MILLIGRAM(S): 40 TABLET, DELAYED RELEASE ORAL at 06:10

## 2024-10-03 RX ADMIN — Medication 220 MILLIGRAM(S): at 00:11

## 2024-10-03 RX ADMIN — Medication 6: at 07:55

## 2024-10-03 RX ADMIN — Medication 6: at 12:14

## 2024-10-03 RX ADMIN — Medication 1 TABLET(S): at 06:10

## 2024-10-03 NOTE — PROGRESS NOTE ADULT - SUBJECTIVE AND OBJECTIVE BOX
Post Operative Note  Patient: ANUM VELAZQUEZ 54y (1970) Female   MRN: [id number]  Location: [address] D1  Visit: 10-01-24 Inpatient  Date: 10-03-24 @ 01:13    Procedure: S/P hysteroscopy D&C with Mirena placement    Subjective:   Patient seen and examined at bedside, reports adquete pain control. She is tolerating diet, ambulating, voiding with some burning.  Patient denies dizziness, chest pain, sob, nausea, vomiting, abdominal pain.    Objective:  Vitals: T(F): 98.6 (10-02-24 @ 22:16), Max: 99.4 (10-02-24 @ 19:31)  HR: 95 (10-02-24 @ 22:16)  BP: 102/67 (10-02-24 @ 22:16) (98/60 - 153/69)  RR: 18 (10-02-24 @ 22:16)  SpO2: 96% (10-02-24 @ 22:16)  Vent Settings:     In:   10-02-24 @ 07:01  -  10-03-24 @ 01:13  --------------------------------------------------------  IN: 225 mL    IV Fluids: dextrose 5%. 1000 milliLiter(s) (50 mL/Hr) IV Continuous <Continuous>  dextrose 5%. 1000 milliLiter(s) (100 mL/Hr) IV Continuous <Continuous>  lactated ringers. 1000 milliLiter(s) (85 mL/Hr) IV Continuous <Continuous>      Out:   10-02-24 @ 07:01  -  10-03-24 @ 01:13  --------------------------------------------------------  OUT: 250 mL    Voided Urine:   10-02-24 @ 07:01  -  10-03-24 @ 01:13  --------------------------------------------------------  OUT: 250 mL    GENERAL: Female lying comfortably in bed in NAD.  HEENT:  NC/AT. Sclera white. Mucous membranes moist.  CARDIO:  Regular rate and rhythm.    RESPIRATORY:  Nonlabored breathing, no accessory muscle use.   ABDOMEN:  Soft, nondistended, nontender.   EXTREMITIES: No calf tenderness bilaterally.  SKIN:  No jaundice, pallor, or cyanosis  NEURO:  A&O x 3    Medications: [Standing]  amoxicillin  875 milliGRAM(s)/clavulanate 1 Tablet(s) Oral two times a day  dextrose 5%. 1000 milliLiter(s) IV Continuous <Continuous>  dextrose 5%. 1000 milliLiter(s) IV Continuous <Continuous>  dextrose 50% Injectable 25 Gram(s) IV Push once  dextrose 50% Injectable 25 Gram(s) IV Push once  dextrose 50% Injectable 12.5 Gram(s) IV Push once  dextrose Oral Gel 15 Gram(s) Oral once PRN  glucagon  Injectable 1 milliGRAM(s) IntraMuscular once  influenza   Vaccine 0.5 milliLiter(s) IntraMuscular once  insulin lispro (ADMELOG) corrective regimen sliding scale   SubCutaneous three times a day before meals  insulin lispro (ADMELOG) corrective regimen sliding scale   SubCutaneous at bedtime  lactated ringers. 1000 milliLiter(s) IV Continuous <Continuous>  metroNIDAZOLE    Tablet 500 milliGRAM(s) Oral three times a day  ondansetron    Tablet 4 milliGRAM(s) Oral three times a day PRN  pantoprazole    Tablet 40 milliGRAM(s) Oral before breakfast  rosuvastatin 5 milliGRAM(s) Oral at bedtime  tranexamic acid IVPB 1000 milliGRAM(s) IV Intermittent every 8 hours    Medications: [PRN]  amoxicillin  875 milliGRAM(s)/clavulanate 1 Tablet(s) Oral two times a day  dextrose 5%. 1000 milliLiter(s) IV Continuous <Continuous>  dextrose 5%. 1000 milliLiter(s) IV Continuous <Continuous>  dextrose 50% Injectable 25 Gram(s) IV Push once  dextrose 50% Injectable 25 Gram(s) IV Push once  dextrose 50% Injectable 12.5 Gram(s) IV Push once  dextrose Oral Gel 15 Gram(s) Oral once PRN  glucagon  Injectable 1 milliGRAM(s) IntraMuscular once  influenza   Vaccine 0.5 milliLiter(s) IntraMuscular once  insulin lispro (ADMELOG) corrective regimen sliding scale   SubCutaneous three times a day before meals  insulin lispro (ADMELOG) corrective regimen sliding scale   SubCutaneous at bedtime  lactated ringers. 1000 milliLiter(s) IV Continuous <Continuous>  metroNIDAZOLE    Tablet 500 milliGRAM(s) Oral three times a day  ondansetron    Tablet 4 milliGRAM(s) Oral three times a day PRN  pantoprazole    Tablet 40 milliGRAM(s) Oral before breakfast  rosuvastatin 5 milliGRAM(s) Oral at bedtime  tranexamic acid IVPB 1000 milliGRAM(s) IV Intermittent every 8 hours    Labs:                     8.3    9.84  )-----------( 392      ( 02 Oct 2024 06:41 )             27.0     135  |  103  |  3[L]  ----------------------------<  166[H]  3.9   |  24  |  0.56    Ca    9.0      02 Oct 2024 06:41    TPro  7.1  /  Alb  2.9[L]  /  TBili  0.4  /  DBili  x   /  AST  37  /  ALT  24  /  AlkPhos  87  10-02    PT/INR - ( 02 Oct 2024 00:48 )   PT: 15.0 sec;   INR: 1.27 ratio      PTT - ( 02 Oct 2024 00:48 )  PTT:35.8 sec    Imaging:  No post-op imaging studies    Assessment:  54yFemale patient S/P hysteroscopy D&C with mirena placement for abnormal uterine bleeding. Patient is doing well post op day #0. AFVSS    Plan:  - Continue diet  - Continue abx  - Pain control  - Incentive spirometry  - Encourage ambulation  - SCDs  - Follow up AM CBC  - Will continue to monitor  
CHIEF COMPLAINT/INTERVAL HISTORY:  Pt. seen and evaluated for acute blood loss anemia 2/2 vaginal bleeding.  Pt. is in no distress.  Denies having CP or SOB.  s/p 1 unit PRBC transfusion last night.  Hbg improved from 6.7 to 8.3.  Pt. still reports having some vaginal bleeding.     REVIEW OF SYSTEMS:  No fever, CP, SOB, or abdominal pain      Vital Signs Last 24 Hrs  T(C): 37.1 (02 Oct 2024 05:05), Max: 37.2 (02 Oct 2024 02:17)  T(F): 98.8 (02 Oct 2024 05:05), Max: 98.9 (02 Oct 2024 02:17)  HR: 89 (02 Oct 2024 05:05) (89 - 101)  BP: 114/70 (02 Oct 2024 05:05) (109/53 - 128/77)  BP(mean): --  RR: 18 (02 Oct 2024 05:05) (16 - 18)  SpO2: 98% (02 Oct 2024 05:05) (98% - 100%)    Parameters below as of 02 Oct 2024 05:05  Patient On (Oxygen Delivery Method): room air        PHYSICAL EXAM:  GENERAL: NAD  HEENT: EOMI, hearing normal, conjunctiva and sclera clear  Chest: CTA bilaterally, no wheezing  CV: S1S2, RRR,   GI: soft, +BS, NT/ND  Musculoskeletal: trace LE edema  Psychiatric: affect nL, mood nL  Skin: warm and dry    LABS:                        8.3    9.84  )-----------( 392      ( 02 Oct 2024 06:41 )             27.0     10-02    135  |  103  |  3[L]  ----------------------------<  166[H]  3.9   |  24  |  0.56    Ca    9.0      02 Oct 2024 06:41    TPro  7.1  /  Alb  2.9[L]  /  TBili  0.4  /  DBili  x   /  AST  37  /  ALT  24  /  AlkPhos  87  10-02    PT/INR - ( 02 Oct 2024 00:48 )   PT: 15.0 sec;   INR: 1.27 ratio         PTT - ( 02 Oct 2024 00:48 )  PTT:35.8 sec  Urinalysis Basic - ( 02 Oct 2024 06:41 )    Color: x / Appearance: x / SG: x / pH: x  Gluc: 166 mg/dL / Ketone: x  / Bili: x / Urobili: x   Blood: x / Protein: x / Nitrite: x   Leuk Esterase: x / RBC: x / WBC x   Sq Epi: x / Non Sq Epi: x / Bacteria: x        
SURGERY PA NOTE ON BEHALF OF DR. Sargent/GYN SURGERY:    S: Patient seen and examined at bedside.   Pt reports feeling much better this am. No acute events overnight. Mild dysuria with urination improved this am. Tolerating diet and ambulating independently. Denies cramping, CP, SOB, palps, N/V.     MEDICATIONS:  amoxicillin  875 milliGRAM(s)/clavulanate 1 Tablet(s) Oral two times a day  dextrose 5%. 1000 milliLiter(s) IV Continuous <Continuous>  dextrose 5%. 1000 milliLiter(s) IV Continuous <Continuous>  dextrose 50% Injectable 25 Gram(s) IV Push once  dextrose 50% Injectable 25 Gram(s) IV Push once  dextrose 50% Injectable 12.5 Gram(s) IV Push once  dextrose Oral Gel 15 Gram(s) Oral once PRN  glucagon  Injectable 1 milliGRAM(s) IntraMuscular once  influenza   Vaccine 0.5 milliLiter(s) IntraMuscular once  insulin lispro (ADMELOG) corrective regimen sliding scale   SubCutaneous at bedtime  insulin lispro (ADMELOG) corrective regimen sliding scale   SubCutaneous three times a day before meals  iron sucrose IVPB 200 milliGRAM(s) IV Intermittent once  lactated ringers. 1000 milliLiter(s) IV Continuous <Continuous>  metroNIDAZOLE    Tablet 500 milliGRAM(s) Oral three times a day  ondansetron    Tablet 4 milliGRAM(s) Oral three times a day PRN  pantoprazole    Tablet 40 milliGRAM(s) Oral before breakfast  rosuvastatin 5 milliGRAM(s) Oral at bedtime  tranexamic acid IVPB 1000 milliGRAM(s) IV Intermittent every 8 hours      O:  Vital Signs Last 24 Hrs  T(C): 36.4 (03 Oct 2024 05:03), Max: 37.4 (02 Oct 2024 19:31)  T(F): 97.6 (03 Oct 2024 05:03), Max: 99.4 (02 Oct 2024 19:31)  HR: 95 (03 Oct 2024 05:03) (82 - 98)  BP: 117/73 (03 Oct 2024 05:03) (98/60 - 153/69)  BP(mean): --  RR: 18 (03 Oct 2024 05:03) (12 - 20)  SpO2: 97% (03 Oct 2024 05:03) (94% - 100%)    Parameters below as of 03 Oct 2024 05:03  Patient On (Oxygen Delivery Method): room air        I&O SUMMARY:    10-02-24 @ 07:01  -  10-03-24 @ 07:00  --------------------------------------------------------  IN: 225 mL / OUT: 2150 mL / NET: -1925 mL        PHYSICAL EXAM:  Lungs: CTA bilat    Card: S1S2  Abd: Soft, NT, ND.  +BS x 4.  No rebound/guarding.    Ext: Calves soft, NT, without edema bilat  GYN: pad with dark red spotting    LABS:                        8.7    8.46  )-----------( 418      ( 03 Oct 2024 07:42 )             29.7     10-02    135  |  103  |  3[L]  ----------------------------<  166[H]  3.9   |  24  |  0.56    Ca    9.0      02 Oct 2024 06:41    TPro  7.1  /  Alb  2.9[L]  /  TBili  0.4  /  DBili  x   /  AST  37  /  ALT  24  /  AlkPhos  87  10-02    PT/INR - ( 02 Oct 2024 00:48 )   PT: 15.0 sec;   INR: 1.27 ratio         PTT - ( 02 Oct 2024 00:48 )  PTT:35.8 sec          RADIOLOGY:

## 2024-10-03 NOTE — DISCHARGE NOTE PROVIDER - NSDCCPCAREPLAN_GEN_ALL_CORE_FT
PRINCIPAL DISCHARGE DIAGNOSIS  Diagnosis: Vaginal bleeding  Assessment and Plan of Treatment: You had D&C with implant of IUD.  Please follow up with your PMD in 1 week and Gynecology Dr. Sargent in 2 weeks.      SECONDARY DISCHARGE DIAGNOSES  Diagnosis: Iron deficiency anemia, unspecified-D50.9  Assessment and Plan of Treatment: You were given 1 unit PRBC transfusion and IV iron.  Please continue ferrous sulfate.     PRINCIPAL DISCHARGE DIAGNOSIS  Diagnosis: Vaginal bleeding  Assessment and Plan of Treatment: You had D&C with implant of IUD.  Please follow up with your PMD in 1 week and Gynecology Dr. Sargent in 2 weeks.  Continue tranexamic acid as prescribed.      SECONDARY DISCHARGE DIAGNOSES  Diagnosis: Iron deficiency anemia, unspecified-D50.9  Assessment and Plan of Treatment: You were given 1 unit PRBC transfusion and IV iron.  Please continue ferrous sulfate.    Diagnosis: Diverticulitis  Assessment and Plan of Treatment: Please complete your course of antibiotics.

## 2024-10-03 NOTE — DISCHARGE NOTE PROVIDER - NSDCFUADDINST_GEN_ALL_CORE_FT
SPECIAL INSTRUCTIONS:  You may shower as per usual.  No tub baths, no hot tubs, no pools.    Nothing per vagina - no douching/intercourse/tampons/etc.  You may resume your usual daily activity (walking, stairclimbing, etc.)  No exercise, no strenuous physical activity, no lifting anything heavier than 5lbs.  You may resume your usual daily diet.    Take over-the-counter Tylenol for pain or discomfort.    Resume your usual medications as outlined in the discharge medication reconciliation.   You are encouraged to walk as much as possible to prevent blood clots in the legs, to maintain lung health after surgery/anesthesia, and to prevent constipation after surgery.    Continue to use the incentive spirometer at home.  Follow-up with Dr. Sargent in her office in 2 weeks - Please call office for appointment if not already made.

## 2024-10-03 NOTE — DISCHARGE NOTE PROVIDER - HOSPITAL COURSE
54 Y.O. F with PMH of iron deficiency anemia , DM , HTN, recurrent vaginal bleeding s/p D & C in november of 2023   patient presented to ED on 9/28 for abdominal pain , at that time patient was found on CT to have acute diverticulitis and was discharged home on Augmentin and flagyl , also during same visit patient was found to have low Hgb of 7.1 and told ED attending that she is currently having the vaginal bleeding   so in ED transfuse 1 U of PRBCs prior to discharge home   patient presented again to ED tonight on 10 /1 as she continue to have the vaginal bleeding currently for 2 weeks , need to change a pad about every 2 hrs , seen clots and was starting to feel tired   denied any SOB or chest pain   no nausea , vomiting or fever   patient stated that her abdominal pain resolved after antibiotics she was given by ED    Pt. was admitted with gynecology and heme/onc consultation.   Pt. was given 1 unit PRBC transfusion.  Hbg improved from 6.7 to 8.3.  She was also given IV iron.  Pt. was brought to OR on 10/2 and had Hysteroscopy, with dilation and curettage.  She also had insertion of IUD.  Pt. tolerated procedure well.      On day of discharge patient's hemoglobin is 8.7.  Pt. feels well and is in no distress.  Afebrile and hemodynamically stable.

## 2024-10-03 NOTE — DISCHARGE NOTE PROVIDER - ATTENDING DISCHARGE PHYSICAL EXAMINATION:
Vitals: T: 97.5  P: 77  BP: 121/80  RR: 20  SpO2: 98%RA  GENERAL: NAD  HEENT: EOMI, hearing normal, conjunctiva and sclera clear  Chest: CTA bilaterally, no wheezing  CV: S1S2, RRR,   GI: soft, +BS, NT/ND  Musculoskeletal: trace LE edema  Psychiatric: affect nL, mood nL  Skin: warm and dry

## 2024-10-03 NOTE — DISCHARGE NOTE PROVIDER - NSDCFUSCHEDAPPT_GEN_ALL_CORE_FT
Susan Gregory  [facility]  OBGYNGEN [address] [facility]  Scheduled Appointment: 10/07/2024    [facility]  [facility]  Scheduled Appointment: 10/09/2024    [facility]  [facility] [employer]  Scheduled Appointment: 10/16/2024

## 2024-10-03 NOTE — DISCHARGE NOTE PROVIDER - NSDCMRMEDTOKEN_GEN_ALL_CORE_FT
amoxicillin-clavulanate 875 mg-125 mg oral tablet: 875 milligram(s) orally 2 times a day  ferrous sulfate 325 mg (65 mg elemental iron) oral delayed release tablet: 1 tab(s) orally once a day  metroNIDAZOLE 500 mg oral tablet: 1 tab(s) orally 3 times a day  ondansetron 4 mg oral tablet, disintegratin tab(s) orally every 8 hours as needed for  nausea  ramipril 5 mg oral tablet: 1 tab(s) orally once a day  rosuvastatin 5 mg oral tablet: 1 tab(s) orally once a day  Xultophy 100 units-3.6 mg/mL subcutaneous solution: 50 unit(s) subcutaneous once a day (at bedtime)   amoxicillin-clavulanate 875 mg-125 mg oral tablet: 875 milligram(s) orally 2 times a day  ferrous sulfate 325 mg (65 mg elemental iron) oral delayed release tablet: 1 tab(s) orally once a day  metroNIDAZOLE 500 mg oral tablet: 1 tab(s) orally 3 times a day  ondansetron 4 mg oral tablet, disintegratin tab(s) orally every 8 hours as needed for  nausea  ramipril 5 mg oral tablet: 1 tab(s) orally once a day  rosuvastatin 5 mg oral tablet: 1 tab(s) orally once a day  tranexamic acid 650 mg oral tablet: 2 tab(s) orally every 8 hours  Xultophy 100 units-3.6 mg/mL subcutaneous solution: 50 unit(s) subcutaneous once a day (at bedtime)

## 2024-10-03 NOTE — PROGRESS NOTE ADULT - REASON FOR ADMISSION
severe anemia sec to vaginal bleeding

## 2024-10-03 NOTE — CASE MANAGEMENT PROGRESS NOTE - NSCMPROGRESSNOTE_GEN_ALL_CORE
Pt was cleared medically for transition home with no skilled needs. The pt is independent with ADL and has no skilled needs for discharge. The pt's  will transport the pt home today and the pt will follow up with GYN Dr. Sargent. Pt signed the discharge notice and verbalized understanding. Copy was given to the pt as well. Bedside nurse made aware of the above plan of care for discharge.

## 2024-10-03 NOTE — DISCHARGE NOTE PROVIDER - CARE PROVIDER_API CALL
Divya Sargent  Obstetrics and Gynecology  [address] [place] [id number]  Phone: [phone]  Fax: [phone]  Follow Up Time: 2 weeks

## 2024-10-03 NOTE — PROGRESS NOTE ADULT - ASSESSMENT
54yFemale patient S/P hysteroscopy D&C with mirena placement for abnormal uterine bleeding.  S/P transfusion for acute on chronic anemia sec to abnormal uterine bleeding  POD #1  Doing well; VSS; Afebrile. Pain controlled  F/U AM labs  Enc ambulation  Incentive spirometer  Likely dc home today  Will discuss with Dr Sargent   54yFemale patient S/P hysteroscopy D&C with mirena placement for abnormal uterine bleeding.  S/P transfusion for acute on chronic anemia sec to abnormal uterine bleeding  POD #1  Doing well; VSS; Afebrile. Pain controlled  AM labs noted  Cont abx  Enc ambulation  Incentive spirometer  Likely dc home today  Will discuss with Dr Sargent   54yFemale patient S/P hysteroscopy D&C with mirena placement for abnormal uterine bleeding.  S/P transfusion for acute on chronic anemia sec to abnormal uterine bleeding  POD #1  Doing well; VSS; Afebrile. Pain controlled  AM labs noted  Cont abx for diverticulitis  Enc ambulation  Incentive spirometer  Likely dc home today  Will discuss with Dr Sargent    ADDENDUM:  Discussed with Dr Sargent-haroon from GYN perspective for DC home today on Lysteda 650 mg 2 tabs po q 8h x 4 days    SPECIAL INSTRUCTIONS:  You may shower as per usual.  No tub baths, no hot tubs, no pools.    Nothing per vagina - no douching/intercourse/tampons/etc.  You may resume your usual daily activity (walking, stairclimbing, etc.)  No exercise, no strenuous physical activity, no lifting anything heavier than 5lbs.  You may resume your usual daily diet.    Take over-the-counter Tylenol for pain or discomfort.    Resume your usual medications as outlined in the discharge medication reconciliation.   You are encouraged to walk as much as possible to prevent blood clots in the legs, to maintain lung health after surgery/anesthesia, and to prevent constipation after surgery.    Continue to use the incentive spirometer at home.  Follow-up with Dr. Sargent in her office in 2 weeks - Please call office for appointment if not already made.

## 2024-10-03 NOTE — DISCHARGE NOTE NURSING/CASE MANAGEMENT/SOCIAL WORK - PATIENT PORTAL LINK FT
You can access the FollowMyHealth Patient Portal offered by [facility] by registering at the following website: [email or web address]. By joining [email or web address] [email or web address] portal, you will also be able to view your health information using other applications (apps) compatible with our system.

## 2024-10-07 ENCOUNTER — APPOINTMENT (OUTPATIENT)
Dept: OBGYN | Facility: CLINIC | Age: 54
End: 2024-10-07
Payer: COMMERCIAL

## 2024-10-07 DIAGNOSIS — N93.9 ABNORMAL UTERINE AND VAGINAL BLEEDING, UNSPECIFIED: ICD-10-CM

## 2024-10-07 DIAGNOSIS — N76.0 ACUTE VAGINITIS: ICD-10-CM

## 2024-10-07 DIAGNOSIS — K57.32 DIVERTICULITIS OF LARGE INTESTINE W/OUT PERFORATION OR ABSCESS W/OUT BLEEDING: ICD-10-CM

## 2024-10-07 PROCEDURE — 99212 OFFICE O/P EST SF 10 MIN: CPT

## 2024-10-07 RX ORDER — FLUCONAZOLE 150 MG/1
150 TABLET ORAL
Qty: 2 | Refills: 3 | Status: ACTIVE | COMMUNITY
Start: 2024-10-07 | End: 1900-01-01

## 2024-10-07 RX ORDER — LEVONORGESTREL 52 MG/1
INTRAUTERINE DEVICE INTRAUTERINE
Refills: 0 | Status: ACTIVE | COMMUNITY

## 2024-10-09 ENCOUNTER — APPOINTMENT (OUTPATIENT)
Dept: INFUSION THERAPY | Facility: HOSPITAL | Age: 54
End: 2024-10-09

## 2024-10-09 LAB — SURGICAL PATHOLOGY STUDY: SIGNIFICANT CHANGE UP

## 2024-10-10 ENCOUNTER — RESULT CHARGE (OUTPATIENT)
Age: 54
End: 2024-10-10

## 2024-10-10 ENCOUNTER — APPOINTMENT (OUTPATIENT)
Dept: INTERNAL MEDICINE | Facility: CLINIC | Age: 54
End: 2024-10-10
Payer: COMMERCIAL

## 2024-10-10 VITALS
RESPIRATION RATE: 18 BRPM | OXYGEN SATURATION: 98 % | BODY MASS INDEX: 37.04 KG/M2 | SYSTOLIC BLOOD PRESSURE: 122 MMHG | HEART RATE: 85 BPM | TEMPERATURE: 97.5 F | HEIGHT: 66 IN | DIASTOLIC BLOOD PRESSURE: 70 MMHG | WEIGHT: 230.5 LBS

## 2024-10-10 DIAGNOSIS — N30.01 ACUTE CYSTITIS WITH HEMATURIA: ICD-10-CM

## 2024-10-10 DIAGNOSIS — N30.00 ACUTE CYSTITIS W/OUT HEMATURIA: ICD-10-CM

## 2024-10-10 PROCEDURE — G2211 COMPLEX E/M VISIT ADD ON: CPT | Mod: NC

## 2024-10-10 PROCEDURE — 81003 URINALYSIS AUTO W/O SCOPE: CPT | Mod: QW

## 2024-10-10 PROCEDURE — 99214 OFFICE O/P EST MOD 30 MIN: CPT

## 2024-10-10 RX ORDER — METFORMIN HYDROCHLORIDE 500 MG/1
500 TABLET, COATED ORAL
Refills: 0 | Status: ACTIVE | COMMUNITY

## 2024-10-10 RX ORDER — LEVOFLOXACIN 500 MG/1
500 TABLET, FILM COATED ORAL
Qty: 5 | Refills: 0 | Status: COMPLETED | COMMUNITY
Start: 2024-10-10 | End: 2024-10-15

## 2024-10-14 LAB — BACTERIA UR CULT: NORMAL

## 2024-10-15 DIAGNOSIS — K57.92 DIVERTICULITIS OF INTESTINE, PART UNSPECIFIED, WITHOUT PERFORATION OR ABSCESS WITHOUT BLEEDING: ICD-10-CM

## 2024-10-15 DIAGNOSIS — D64.9 ANEMIA, UNSPECIFIED: ICD-10-CM

## 2024-10-16 ENCOUNTER — LABORATORY RESULT (OUTPATIENT)
Age: 54
End: 2024-10-16

## 2024-10-16 ENCOUNTER — APPOINTMENT (OUTPATIENT)
Dept: INFUSION THERAPY | Facility: HOSPITAL | Age: 54
End: 2024-10-16

## 2024-10-16 LAB
FERRITIN SERPL-MCNC: 70 NG/ML
IRON SATN MFR SERPL: 5 %
IRON SERPL-MCNC: 22 UG/DL
TIBC SERPL-MCNC: 446 UG/DL
UIBC SERPL-MCNC: 425 UG/DL

## 2024-10-17 LAB
BASOPHILS # BLD AUTO: 0.04 K/UL
BASOPHILS NFR BLD AUTO: 0.6 %
EOSINOPHIL # BLD AUTO: 0.08 K/UL
EOSINOPHIL NFR BLD AUTO: 1.2 %
HCT VFR BLD CALC: 35.2 %
HGB BLD-MCNC: 9.6 G/DL
IMM GRANULOCYTES NFR BLD AUTO: 0.1 %
LYMPHOCYTES # BLD AUTO: 1.84 K/UL
LYMPHOCYTES NFR BLD AUTO: 26.7 %
MAN DIFF?: NORMAL
MCHC RBC-ENTMCNC: 23.7 PG
MCHC RBC-ENTMCNC: 27.3 GM/DL
MCV RBC AUTO: 86.9 FL
MONOCYTES # BLD AUTO: 0.5 K/UL
MONOCYTES NFR BLD AUTO: 7.3 %
NEUTROPHILS # BLD AUTO: 4.42 K/UL
NEUTROPHILS NFR BLD AUTO: 64.1 %
PLATELET # BLD AUTO: 510 K/UL
RBC # BLD: 4.05 M/UL
RBC # FLD: 19.2 %
WBC # FLD AUTO: 6.89 K/UL

## 2024-10-18 ENCOUNTER — RX RENEWAL (OUTPATIENT)
Age: 54
End: 2024-10-18

## 2024-10-22 DIAGNOSIS — D50.9 IRON DEFICIENCY ANEMIA, UNSPECIFIED: ICD-10-CM

## 2024-10-24 ENCOUNTER — APPOINTMENT (OUTPATIENT)
Dept: HEMATOLOGY ONCOLOGY | Facility: CLINIC | Age: 54
End: 2024-10-24
Payer: COMMERCIAL

## 2024-10-24 DIAGNOSIS — D64.9 ANEMIA, UNSPECIFIED: ICD-10-CM

## 2024-10-24 DIAGNOSIS — D50.9 IRON DEFICIENCY ANEMIA, UNSPECIFIED: ICD-10-CM

## 2024-10-24 DIAGNOSIS — D72.829 ELEVATED WHITE BLOOD CELL COUNT, UNSPECIFIED: ICD-10-CM

## 2024-10-24 DIAGNOSIS — Z86.2 PERSONAL HISTORY OF DISEASES OF THE BLOOD AND BLOOD-FORMING ORGANS AND CERTAIN DISORDERS INVOLVING THE IMMUNE MECHANISM: ICD-10-CM

## 2024-10-24 PROCEDURE — 99214 OFFICE O/P EST MOD 30 MIN: CPT

## 2024-10-29 ENCOUNTER — APPOINTMENT (OUTPATIENT)
Dept: INFUSION THERAPY | Facility: HOSPITAL | Age: 54
End: 2024-10-29

## 2024-11-05 ENCOUNTER — APPOINTMENT (OUTPATIENT)
Dept: INFUSION THERAPY | Facility: HOSPITAL | Age: 54
End: 2024-11-05

## 2024-11-07 ENCOUNTER — OUTPATIENT (OUTPATIENT)
Dept: OUTPATIENT SERVICES | Facility: HOSPITAL | Age: 54
LOS: 1 days | Discharge: HOME | End: 2024-11-07

## 2024-11-07 DIAGNOSIS — Z98.890 OTHER SPECIFIED POSTPROCEDURAL STATES: Chronic | ICD-10-CM

## 2024-11-07 DIAGNOSIS — D72.829 ELEVATED WHITE BLOOD CELL COUNT, UNSPECIFIED: ICD-10-CM

## 2024-11-14 ENCOUNTER — APPOINTMENT (OUTPATIENT)
Dept: OBGYN | Facility: CLINIC | Age: 54
End: 2024-11-14
Payer: COMMERCIAL

## 2024-11-14 VITALS
WEIGHT: 223 LBS | BODY MASS INDEX: 35.84 KG/M2 | HEART RATE: 82 BPM | SYSTOLIC BLOOD PRESSURE: 133 MMHG | HEIGHT: 66 IN | DIASTOLIC BLOOD PRESSURE: 77 MMHG

## 2024-11-14 DIAGNOSIS — T83.32XA DISPLACEMENT OF INTRAUTERINE CONTRACEPTIVE DEVICE, INITIAL ENCOUNTER: ICD-10-CM

## 2024-11-14 DIAGNOSIS — Z01.419 ENCOUNTER FOR GYNECOLOGICAL EXAMINATION (GENERAL) (ROUTINE) W/OUT ABNORMAL FINDINGS: ICD-10-CM

## 2024-11-14 PROCEDURE — 99459 PELVIC EXAMINATION: CPT

## 2024-11-14 PROCEDURE — 99396 PREV VISIT EST AGE 40-64: CPT

## 2024-11-19 ENCOUNTER — NON-APPOINTMENT (OUTPATIENT)
Age: 54
End: 2024-11-19

## 2024-11-19 ENCOUNTER — APPOINTMENT (OUTPATIENT)
Dept: INFUSION THERAPY | Facility: HOSPITAL | Age: 54
End: 2024-11-19

## 2024-11-19 NOTE — PATIENT PROFILE ADULT - NSPROHMDIABETMGMTSTRAT_GEN_A_NUR
Physical Therapy Visit    Visit Type: Daily Treatment Note  Visit: 3  Referring Provider: Amrik Conroy PA-C  Medical Diagnosis (from order): M47.816 - Lumbar spondylosis  M25.552 - Left hip pain     SUBJECTIVE                                                                                                               Pt reviews stiffness after last PT session.  Pt describes since beginning exercises that he is feeling \"the rest of (his) body.\"  Reviews pain at lower back when in a forward bent position mounting a TV over the weekend.      Pain / Symptoms  - Pain rating (out of 10): Current: 1       OBJECTIVE                                                                                                                                         Treatment     Therapeutic Exercise  Nu-step level 5 x 5 minutes; upper extremities and lower extremities    Hamstring stretch at step 2 x 20 seconds   Calf stretch off edge of step 2 x 20 seconds     Single knee to chest x 20 seconds    Lower trunk rotation stretch 2 x 20 seconds     Supine decompression sequence: 3 seconds x 3 repetitions each   shoulder press/collarbone lengthener  head press  leg lengthener - reviewed cueing to limit exertion on right to limit pain  leg press  arm lengthener     Neuromuscular Re-Education  Educated pt on diaphragmatic breathing with \"balloon breath\" (inhale expand balloon & exhale balloon deflates) in hooklying  Educated pt in contraction of core stabilizers with \"umbrella breath\" (close the umbrella to tighten abdominals) in hooklying    Supine march cueing to exhale on exertion for increased activation of core  Marching with pause at 90/90    Reviewed and practiced sitting and standing posture.            Home Exercise Program  Decompression exercise sequence    Access Code: [email or web address]  URL: [email or web address]  Date: 11/19/2024  Prepared by: Allie Hopkins    Exercises  - Standing Hamstring Stretch on Chair  - 1 x  daily - 2 reps - 20 seconds hold  - Gastroc Stretch on Step  - 1 x daily - 2 reps - 20 seconds hold  - Supine Single Knee to Chest Stretch  - 1 x daily - 2 reps - 20 seconds hold  - Supine Lower Trunk Rotation  - 1 x daily - 2 reps - 20 seconds hold  - Supine March  - 1-2 sets - 10 reps      ASSESSMENT                                                                                                            - pt with good ability to contract abdominal muscles; provided instruction/cueing in how to coordinate with exhale for best activation of core  - pt able to perform supine march and progression to march with pause at 90/90 position but did note some increase in pain a left lower back after.  Once upright and returned to walking pt noted that pain was \"not too bad\"  - receptive to education on posture  Pain after session: pain rating following session not formally assesed.  Education:   - Results of above outlined education: Verbalizes understanding and Needs reinforcement    PLAN                                                                                                                           Suggestions for next session as indicated: Progress per plan of care  Assess response to addition of supine march, lower trunk rotation stretch to home exercise program and review to reinforce  Progress core stabilization exercises.  Try dead bug, hip adduction isometric, 4 point core stabilization.  Body mechanics education with applications to daily activities.       Therapy procedure time and total treatment time can be found documented on the Time Entry flowsheet     blood glucose testing/diet modification

## 2024-11-26 ENCOUNTER — APPOINTMENT (OUTPATIENT)
Dept: INFUSION THERAPY | Facility: HOSPITAL | Age: 54
End: 2024-11-26

## 2024-12-03 ENCOUNTER — APPOINTMENT (OUTPATIENT)
Dept: HEMATOLOGY ONCOLOGY | Facility: CLINIC | Age: 54
End: 2024-12-03
Payer: COMMERCIAL

## 2024-12-05 ENCOUNTER — OUTPATIENT (OUTPATIENT)
Dept: OUTPATIENT SERVICES | Facility: HOSPITAL | Age: 54
LOS: 1 days | End: 2024-12-05
Payer: COMMERCIAL

## 2024-12-05 ENCOUNTER — APPOINTMENT (OUTPATIENT)
Dept: RADIOLOGY | Facility: CLINIC | Age: 54
End: 2024-12-05
Payer: COMMERCIAL

## 2024-12-05 DIAGNOSIS — Z98.890 OTHER SPECIFIED POSTPROCEDURAL STATES: Chronic | ICD-10-CM

## 2024-12-05 DIAGNOSIS — Z00.8 ENCOUNTER FOR OTHER GENERAL EXAMINATION: ICD-10-CM

## 2024-12-05 PROCEDURE — 73560 X-RAY EXAM OF KNEE 1 OR 2: CPT | Mod: 26,RT

## 2024-12-05 PROCEDURE — 73560 X-RAY EXAM OF KNEE 1 OR 2: CPT

## 2024-12-06 ENCOUNTER — LABORATORY RESULT (OUTPATIENT)
Age: 54
End: 2024-12-06

## 2024-12-06 LAB
FERRITIN SERPL-MCNC: 105 NG/ML
IRON SATN MFR SERPL: 7 %
IRON SERPL-MCNC: 30 UG/DL
TIBC SERPL-MCNC: 416 UG/DL
UIBC SERPL-MCNC: 385 UG/DL

## 2024-12-07 LAB
BASOPHILS # BLD AUTO: 0.03 K/UL
BASOPHILS NFR BLD AUTO: 0.3 %
EOSINOPHIL # BLD AUTO: 0.08 K/UL
EOSINOPHIL NFR BLD AUTO: 0.9 %
HCT VFR BLD CALC: 42.8 %
HGB BLD-MCNC: 12.5 G/DL
IMM GRANULOCYTES NFR BLD AUTO: 0.2 %
LYMPHOCYTES # BLD AUTO: 1.85 K/UL
LYMPHOCYTES NFR BLD AUTO: 19.7 %
MAN DIFF?: NORMAL
MCHC RBC-ENTMCNC: 22.6 PG
MCHC RBC-ENTMCNC: 29.2 G/DL
MCV RBC AUTO: 77.5 FL
MONOCYTES # BLD AUTO: 0.55 K/UL
MONOCYTES NFR BLD AUTO: 5.9 %
NEUTROPHILS # BLD AUTO: 6.86 K/UL
NEUTROPHILS NFR BLD AUTO: 73 %
PLATELET # BLD AUTO: 451 K/UL
RBC # BLD: 5.52 M/UL
RBC # FLD: 21.9 %
WBC # FLD AUTO: 9.39 K/UL

## 2024-12-09 ENCOUNTER — APPOINTMENT (OUTPATIENT)
Dept: ULTRASOUND IMAGING | Facility: CLINIC | Age: 54
End: 2024-12-09
Payer: COMMERCIAL

## 2024-12-09 ENCOUNTER — OUTPATIENT (OUTPATIENT)
Dept: OUTPATIENT SERVICES | Facility: HOSPITAL | Age: 54
LOS: 1 days | End: 2024-12-09
Payer: COMMERCIAL

## 2024-12-09 DIAGNOSIS — Z98.890 OTHER SPECIFIED POSTPROCEDURAL STATES: Chronic | ICD-10-CM

## 2024-12-09 DIAGNOSIS — T83.32XA DISPLACEMENT OF INTRAUTERINE CONTRACEPTIVE DEVICE, INITIAL ENCOUNTER: ICD-10-CM

## 2024-12-09 DIAGNOSIS — Z00.8 ENCOUNTER FOR OTHER GENERAL EXAMINATION: ICD-10-CM

## 2024-12-09 PROCEDURE — 76856 US EXAM PELVIC COMPLETE: CPT

## 2024-12-09 PROCEDURE — 76830 TRANSVAGINAL US NON-OB: CPT | Mod: 26

## 2024-12-09 PROCEDURE — 76856 US EXAM PELVIC COMPLETE: CPT | Mod: 26

## 2024-12-09 PROCEDURE — 76830 TRANSVAGINAL US NON-OB: CPT

## 2024-12-11 ENCOUNTER — APPOINTMENT (OUTPATIENT)
Dept: HEMATOLOGY ONCOLOGY | Facility: CLINIC | Age: 54
End: 2024-12-11
Payer: COMMERCIAL

## 2024-12-11 DIAGNOSIS — Z86.2 PERSONAL HISTORY OF DISEASES OF THE BLOOD AND BLOOD-FORMING ORGANS AND CERTAIN DISORDERS INVOLVING THE IMMUNE MECHANISM: ICD-10-CM

## 2024-12-11 DIAGNOSIS — D50.9 IRON DEFICIENCY ANEMIA, UNSPECIFIED: ICD-10-CM

## 2024-12-11 DIAGNOSIS — D72.829 ELEVATED WHITE BLOOD CELL COUNT, UNSPECIFIED: ICD-10-CM

## 2024-12-11 DIAGNOSIS — D64.9 ANEMIA, UNSPECIFIED: ICD-10-CM

## 2024-12-11 PROCEDURE — 99213 OFFICE O/P EST LOW 20 MIN: CPT

## 2025-01-20 ENCOUNTER — RX RENEWAL (OUTPATIENT)
Age: 55
End: 2025-01-20

## 2025-02-07 ENCOUNTER — LABORATORY RESULT (OUTPATIENT)
Age: 55
End: 2025-02-07

## 2025-02-07 ENCOUNTER — OUTPATIENT (OUTPATIENT)
Dept: OUTPATIENT SERVICES | Facility: HOSPITAL | Age: 55
LOS: 1 days | Discharge: HOME | End: 2025-02-07

## 2025-02-07 DIAGNOSIS — Z98.890 OTHER SPECIFIED POSTPROCEDURAL STATES: Chronic | ICD-10-CM

## 2025-02-07 DIAGNOSIS — D72.829 ELEVATED WHITE BLOOD CELL COUNT, UNSPECIFIED: ICD-10-CM

## 2025-02-11 ENCOUNTER — APPOINTMENT (OUTPATIENT)
Dept: HEMATOLOGY ONCOLOGY | Facility: CLINIC | Age: 55
End: 2025-02-11
Payer: COMMERCIAL

## 2025-02-11 DIAGNOSIS — D72.829 ELEVATED WHITE BLOOD CELL COUNT, UNSPECIFIED: ICD-10-CM

## 2025-02-11 DIAGNOSIS — Z86.2 PERSONAL HISTORY OF DISEASES OF THE BLOOD AND BLOOD-FORMING ORGANS AND CERTAIN DISORDERS INVOLVING THE IMMUNE MECHANISM: ICD-10-CM

## 2025-02-11 DIAGNOSIS — D64.9 ANEMIA, UNSPECIFIED: ICD-10-CM

## 2025-02-11 DIAGNOSIS — D50.9 IRON DEFICIENCY ANEMIA, UNSPECIFIED: ICD-10-CM

## 2025-02-11 PROCEDURE — G2211 COMPLEX E/M VISIT ADD ON: CPT | Mod: NC,95

## 2025-02-11 PROCEDURE — 99213 OFFICE O/P EST LOW 20 MIN: CPT | Mod: 95

## 2025-02-24 NOTE — SBIRT NOTE ADULT - NSSBIRTALCACTION/INTER_GEN_A_CORE
"Presents for C1D1 of pembrolizumab    Patient was educated on information about each medication including dose, route, frequency, and common adverse effects. Patient was provided both verbal and written counseling. UpToDate Lexidrug adult patient education printed and provided to patient and key information verbally highlighted including: Overview of regimen including but not limited to infusion times; recognition and management of allergic/infusion reactions; \"call your doctor right away\" parameters.     All of the patient's questions were answered and patient expressed verbal understanding    " None

## 2025-04-22 ENCOUNTER — RX RENEWAL (OUTPATIENT)
Age: 55
End: 2025-04-22

## 2025-05-23 ENCOUNTER — APPOINTMENT (OUTPATIENT)
Dept: INTERNAL MEDICINE | Facility: CLINIC | Age: 55
End: 2025-05-23

## 2025-05-31 NOTE — ED PROVIDER NOTE - MUSCULOSKELETAL, MLM
25  India Lopez : 1979 Sex: female  Age 45 y.o.      Subjective:  Chief Complaint   Patient presents with    Cough     X 2 months     Congestion     X 2 months     Wheezing     X 2 months     Headache     X 2 months          HPI:     History of Present Illness  The patient is a 45-year-old female who presents for evaluation of cold and sinus infection type symptoms.    She has been experiencing symptoms consistent with a cold and sinus infection for approximately 2 months. Her illness began in early 2025 with a severe cold and cough, which have since persisted. A month ago, she sought medical attention due to a particularly severe cough and was prescribed prednisone, which provided some relief. However, upon discontinuation of the medication, her symptoms returned. She reports a persistent, forceful cough that has resulted in chest soreness and episodes of vomiting due to gagging. Additionally, she experiences wheezing at night, loud enough to disrupt her sleep. She also describes a sensation of pressure in her face and forehead, occasional tooth pain, and the expectoration of yellow sputum. She has no history of asthma but acknowledges a history of allergies. She believes her current symptoms are a continuation of her illness from 2025. Her current treatment regimen includes albuterol, Astelin nasal spray, Mucinex, and Zyrtec-D. On some days, she requires albuterol up to 4 or 5 times due to severe wheezing. She is a non-smoker and reports frequent ibuprofen use.    SOCIAL HISTORY  She is a teacher.            ROS:   Unless otherwise stated in this report the patient's positive and negative responses for review of systems for constitutional, eyes, ENT, cardiovascular, respiratory, gastrointestinal, neurological, , musculoskeletal, and integument systems and related systems to the presenting problem are either stated in the history of present illness or were not pertinent or were  Spine appears normal, range of motion is not limited, no muscle or joint tenderness

## 2025-06-04 ENCOUNTER — NON-APPOINTMENT (OUTPATIENT)
Age: 55
End: 2025-06-04

## 2025-06-06 ENCOUNTER — APPOINTMENT (OUTPATIENT)
Dept: INTERNAL MEDICINE | Facility: CLINIC | Age: 55
End: 2025-06-06
Payer: COMMERCIAL

## 2025-06-06 VITALS
BODY MASS INDEX: 34.23 KG/M2 | DIASTOLIC BLOOD PRESSURE: 96 MMHG | HEIGHT: 66 IN | SYSTOLIC BLOOD PRESSURE: 146 MMHG | HEART RATE: 82 BPM | WEIGHT: 213 LBS | TEMPERATURE: 97.1 F | OXYGEN SATURATION: 97 %

## 2025-06-06 VITALS — DIASTOLIC BLOOD PRESSURE: 80 MMHG | SYSTOLIC BLOOD PRESSURE: 132 MMHG

## 2025-06-06 PROCEDURE — G0447 BEHAVIOR COUNSEL OBESITY 15M: CPT | Mod: 59

## 2025-06-06 PROCEDURE — 99214 OFFICE O/P EST MOD 30 MIN: CPT

## 2025-06-06 PROCEDURE — G2211 COMPLEX E/M VISIT ADD ON: CPT | Mod: NC

## 2025-06-13 ENCOUNTER — OUTPATIENT (OUTPATIENT)
Dept: OUTPATIENT SERVICES | Facility: HOSPITAL | Age: 55
LOS: 1 days | Discharge: HOME | End: 2025-06-13

## 2025-06-13 DIAGNOSIS — D72.819 DECREASED WHITE BLOOD CELL COUNT, UNSPECIFIED: ICD-10-CM

## 2025-06-13 DIAGNOSIS — Z98.890 OTHER SPECIFIED POSTPROCEDURAL STATES: Chronic | ICD-10-CM

## 2025-06-16 ENCOUNTER — APPOINTMENT (OUTPATIENT)
Dept: HEMATOLOGY ONCOLOGY | Facility: CLINIC | Age: 55
End: 2025-06-16

## 2025-06-26 ENCOUNTER — APPOINTMENT (OUTPATIENT)
Dept: HEMATOLOGY ONCOLOGY | Facility: CLINIC | Age: 55
End: 2025-06-26

## 2025-06-26 PROCEDURE — 99213 OFFICE O/P EST LOW 20 MIN: CPT | Mod: 95

## 2025-08-18 RX ORDER — ALPRAZOLAM 0.25 MG/1
0.25 TABLET ORAL
Qty: 2 | Refills: 0 | Status: ACTIVE | COMMUNITY
Start: 2025-08-18 | End: 1900-01-01

## (undated) DEVICE — DRAPE IRRIGATION POUCH 19X23"

## (undated) DEVICE — PREP TRAY DRY SKIN PREP SCRUB

## (undated) DEVICE — WARMING BLANKET UPPER ADULT

## (undated) DEVICE — GOWN XL

## (undated) DEVICE — SOL IRR BAG NS 0.9% 3000ML

## (undated) DEVICE — TUBING SUCTION NONCONDUCTIVE 6MM X 12FT

## (undated) DEVICE — DRAPE LIGHT HANDLE COVER (GREEN)

## (undated) DEVICE — TUBING IRR SET FOR CYSTOSCOPY 77"

## (undated) DEVICE — ELCTR REM POLYHESIVE ADULT PT RETURN 15FT

## (undated) DEVICE — PLV-SCD MACHINE: Type: DURABLE MEDICAL EQUIPMENT

## (undated) DEVICE — POSITIONER STRAP ARMBOARD VELCRO TS-30

## (undated) DEVICE — TUBING STRYKER HYSTEROSCOPY INFLOW OUTFLOW

## (undated) DEVICE — CABLE DAC ACTIVE CORD

## (undated) DEVICE — PACK D&C

## (undated) DEVICE — VISITEC 4X4

## (undated) DEVICE — TUBING SUCTION 20FT

## (undated) DEVICE — GLV 7 PROTEXIS (WHITE)

## (undated) DEVICE — DRSG TELFA 3 X 8

## (undated) DEVICE — WARMING BLANKET FULL ADULT

## (undated) DEVICE — DRSG PAD SANITARY OB

## (undated) DEVICE — GOWN XXL

## (undated) DEVICE — GOWN LG

## (undated) DEVICE — VENODYNE/SCD SLEEVE CALF MEDIUM

## (undated) DEVICE — DRAPE TOWEL BLUE 17" X 24"

## (undated) DEVICE — TUBING IV EXTENSION 30"

## (undated) DEVICE — SYMPHION 3.6MM RESECTING DEVICE

## (undated) DEVICE — SOL IRR POUR NS 0.9% 1000ML

## (undated) DEVICE — VENODYNE/SCD SLEEVE CALF LARGE

## (undated) DEVICE — DRAPE UNDER BUTTOCKS W SCREEN

## (undated) DEVICE — GLV 6 PROTEXIS (WHITE)

## (undated) DEVICE — PRESSURE INFUSOR BAG 1000ML

## (undated) DEVICE — PROTECTOR HEEL / ELBOW FLUFFY

## (undated) DEVICE — PACK LITHOTOMY

## (undated) DEVICE — PREP BETADINE SPONGE STICKS

## (undated) DEVICE — ELCTR CUTTING LOOP RIGHT ANGLE 24FR

## (undated) DEVICE — LABELS BLANK W PEN

## (undated) DEVICE — SYMPHION FLUID MANAGEMENT DEVICE

## (undated) DEVICE — BASIN SET DOUBLE

## (undated) DEVICE — TUBING TUR 2 PRONG

## (undated) DEVICE — SOL INJ LR 1000ML

## (undated) DEVICE — DRAPE MAYO STAND 23"